# Patient Record
Sex: FEMALE | Race: WHITE | Employment: FULL TIME | URBAN - METROPOLITAN AREA
[De-identification: names, ages, dates, MRNs, and addresses within clinical notes are randomized per-mention and may not be internally consistent; named-entity substitution may affect disease eponyms.]

---

## 2017-04-17 PROBLEM — E66.9 OBESITY WITH SERIOUS COMORBIDITY: Status: ACTIVE | Noted: 2017-04-17

## 2017-08-11 PROBLEM — F41.1 GAD (GENERALIZED ANXIETY DISORDER): Status: ACTIVE | Noted: 2017-08-11

## 2017-08-11 PROBLEM — E66.9 OBESITY (BMI 35.0-39.9 WITHOUT COMORBIDITY): Status: ACTIVE | Noted: 2017-08-11

## 2017-08-11 PROBLEM — F32.A DEPRESSION: Status: ACTIVE | Noted: 2017-08-11

## 2017-08-11 PROBLEM — E66.9 OBESITY WITH SERIOUS COMORBIDITY: Status: RESOLVED | Noted: 2017-04-17 | Resolved: 2017-08-11

## 2017-09-12 ENCOUNTER — PATIENT OUTREACH (OUTPATIENT)
Dept: OTHER | Age: 52
End: 2017-09-12

## 2017-09-12 NOTE — PROGRESS NOTES
First attempt to reach patient to offer [employer] Benefits CM. Left a discreet voice mail with this CM call back number. Will attempt to call again.

## 2017-09-20 ENCOUNTER — PATIENT OUTREACH (OUTPATIENT)
Dept: OTHER | Age: 52
End: 2017-09-20

## 2017-09-20 NOTE — PROGRESS NOTES
Patient identified as eligible for [address] [place] [address] services. Second telephone outreach attempted. Left discreet voicemail with this CM confidential contact information. Will send UTR letter.

## 2017-09-20 NOTE — LETTER
9/20/2017 4:15 PM 
 
Ms. Kobe Chen [address] [place] [address] Dear Ms Servando Jeffrey My name is Jenny Walden , Employee Care Manager for [facility] and I have been trying to reach you. The Employee Care  is a free-of-charge confidential service provided to our employees and their family members covered by the [facility]. The program will provide an employee and his/her family with the [facility] expertise to assist in navigating the health care delivery system, provider services, and their overall care needsso as to assure and improve health care interactions and enhance the quality of life. This program is designed to provide you with the opportunity to have a [facility] care manager partner with you for the following services: 
 
1.)  Care transitions-such as when you come home from the hospital 
2.) When help is needed to manage your disease process 3.) When you are faced with managing a chronic or complex medical condition [facility] is dedicated to empowering the good health of its community and improving the quality of care and care experiences for employees and their families. We are commited to safeguarding patient confidentiality and privacy,  assuring that every employee has the respect he or she deserves in managing their health. The information shared with your care manager will not be shared with anyone else aside from those you identify as part of your care team, and will only be used to assist you with any identified care needs. Please contact me if you would like this service provided to you. Sincerely, Sincerely, Lb Christine RN cell [phone]

## 2017-10-13 ENCOUNTER — PATIENT OUTREACH (OUTPATIENT)
Dept: OTHER | Age: 52
End: 2017-10-13

## 2017-10-13 NOTE — LETTER
10/13/2017 1:20 PM 
 
 
Radha Mcclain [address] [place] [phone] Dear Ms Mei Mcclain My name is Len Hansen , Employee Care Manager for [facility], and I have been trying to reach you. The Employee Care  is a free-of-charge, confidential service provided to our employees and their family members covered by the [employer]. Part of my job is to follow up with members who have recently been in the hospital or emergency room, to help them coordinate their care and answer questions they may have about their visit. I am able to provide assistance with medication questions, scheduling needed follow-up appointments, and arranging services like home health or home medical equipment. I can also provide education regarding your hospital or ER visit as well as your medical conditions. As healthcare providers, we know that patients do better when they have close follow up with a primary care provider (PCP), especially after a hospital or emergency department visit. If you do not have a PCP, I can help you find one that is convenient to you and covered by your insurance. I can also help you understand any after visit instructions, such as what symptoms to watch out for, or any new or changed medications. Remember that you can access your After Visit Summary by logging into your [employer] account. If you do not have a [employer] account, I can help you request access. Our program is designed to provide you with the opportunity to have a [facility] care manager partner with you for your healthcare needs. Please contact me at the below number if I can provide you with assistance for any of the above services. Sincerely, Sincerely, Danyell Pak, EMI  
[phone]

## 2018-06-18 PROBLEM — E66.01 SEVERE OBESITY (BMI 35.0-39.9): Status: ACTIVE | Noted: 2018-06-18

## 2018-12-06 ENCOUNTER — PATIENT OUTREACH (OUTPATIENT)
Dept: OTHER | Age: 53
End: 2018-12-06

## 2018-12-06 NOTE — PROGRESS NOTES
Patient on report as eligible for Case Management. Left discreet message on voicemail with this CM contact information. Will attempt to contact again to offer [phone] Management services.

## 2018-12-11 ENCOUNTER — PATIENT OUTREACH (OUTPATIENT)
Dept: OTHER | Age: 53
End: 2018-12-11

## 2018-12-11 NOTE — PROGRESS NOTES
Patient identified as eligible for [address] [place] [address] services. Second telephone outreach attempted. Left discreet voicemail with this CM confidential contact information. Will send UTR letter.

## 2018-12-11 NOTE — LETTER
12/11/2018 3:45 PM 
 
Ms. Yolis Hernandez [address] [place] [address] Dear Ms. Deandra Hager, My name is Miguel Greenberg, Employee Care Manager for [employer] and I have been trying to reach you. The [facility]) program is a free-of-charge confidential service provided to our employees and their family members covered by the [employer]. The program will provide an employee and his/her family with the [employer] expertise to assist in navigating the health care delivery system, provider services, and their overall care needsso as to assure and improve health care interactions and enhance the quality of life. This program is designed to provide you with the opportunity to have a [employer] care manager partner with you for the following services: 
 
 1) when you come home from the hospital or emergency room 2) when help is needed to manage your disease 3) when you need assistance coordinating services or appointments [facility] now partners with [facility]. If you are a qualifying employee, you may receive an additional 10 wellness incentive points for every month of active participation with an Employee Care Manager. [employer] is dedicated to empowering the good health of its community and improving the quality of care and care experiences for employees and their families. We are committed to safeguarding patient confidentiality and privacy, assuring that every employee has the respect he or she deserves in managing their health. The information shared with your care manager will not be shared with anyone else aside from those you identify as part of your care team, and will only be used to assist you with any identified care needs. Please contact me if you would like this service provided to you. Sincerely, Carmen Martel RN

## 2019-01-04 ENCOUNTER — PATIENT OUTREACH (OUTPATIENT)
Dept: OTHER | Age: 54
End: 2019-01-04

## 2019-01-04 NOTE — PROGRESS NOTES
Patient identified as eligible for [address] [place] [address] services. Third telephone outreach attempted. Left discreet voicemail with this CM confidential contact information. UTR letter has been sent. Will resolve if no response in the next week.

## 2019-01-11 ENCOUNTER — DOCUMENTATION ONLY (OUTPATIENT)
Dept: OTHER | Age: 54
End: 2019-01-11

## 2019-09-11 ENCOUNTER — OFFICE VISIT (OUTPATIENT)
Dept: FAMILY MEDICINE CLINIC | Age: 54
End: 2019-09-11
Payer: COMMERCIAL

## 2019-09-11 VITALS
WEIGHT: 202 LBS | OXYGEN SATURATION: 96 % | HEART RATE: 90 BPM | HEIGHT: 62 IN | SYSTOLIC BLOOD PRESSURE: 126 MMHG | DIASTOLIC BLOOD PRESSURE: 80 MMHG | BODY MASS INDEX: 37.17 KG/M2

## 2019-09-11 DIAGNOSIS — Z13.220 SCREENING FOR CHOLESTEROL LEVEL: ICD-10-CM

## 2019-09-11 DIAGNOSIS — R25.2 MUSCLE CRAMPS: ICD-10-CM

## 2019-09-11 DIAGNOSIS — K58.1 IRRITABLE BOWEL SYNDROME WITH CONSTIPATION: ICD-10-CM

## 2019-09-11 DIAGNOSIS — J45.909 MILD REACTIVE AIRWAYS DISEASE, UNSPECIFIED WHETHER PERSISTENT: ICD-10-CM

## 2019-09-11 DIAGNOSIS — R00.0 TACHYCARDIA: ICD-10-CM

## 2019-09-11 DIAGNOSIS — K64.9 HEMORRHOIDS, UNSPECIFIED HEMORRHOID TYPE: ICD-10-CM

## 2019-09-11 DIAGNOSIS — G47.00 INSOMNIA, UNSPECIFIED TYPE: ICD-10-CM

## 2019-09-11 DIAGNOSIS — E03.9 HYPOTHYROIDISM, UNSPECIFIED TYPE: Primary | ICD-10-CM

## 2019-09-11 DIAGNOSIS — Z13.1 SCREENING FOR DIABETES MELLITUS: ICD-10-CM

## 2019-09-11 PROCEDURE — G8427 DOCREV CUR MEDS BY ELIG CLIN: HCPCS | Performed by: NURSE PRACTITIONER

## 2019-09-11 PROCEDURE — 3017F COLORECTAL CA SCREEN DOC REV: CPT | Performed by: NURSE PRACTITIONER

## 2019-09-11 PROCEDURE — G8417 CALC BMI ABV UP PARAM F/U: HCPCS | Performed by: NURSE PRACTITIONER

## 2019-09-11 PROCEDURE — 1036F TOBACCO NON-USER: CPT | Performed by: NURSE PRACTITIONER

## 2019-09-11 PROCEDURE — 99213 OFFICE O/P EST LOW 20 MIN: CPT | Performed by: NURSE PRACTITIONER

## 2019-09-11 RX ORDER — LEVOTHYROXINE, LIOTHYRONINE 76; 18 UG/1; UG/1
TABLET ORAL
Qty: 30 TABLET | Refills: 1 | Status: SHIPPED | OUTPATIENT
Start: 2019-09-11 | End: 2019-11-25 | Stop reason: ALTCHOICE

## 2019-09-11 RX ORDER — LEVOTHYROXINE, LIOTHYRONINE 76; 18 UG/1; UG/1
TABLET ORAL
Refills: 0 | COMMUNITY
Start: 2019-07-30 | End: 2019-09-11 | Stop reason: SDUPTHER

## 2019-09-11 RX ORDER — QUETIAPINE FUMARATE 25 MG/1
25 TABLET, FILM COATED ORAL NIGHTLY
Qty: 30 TABLET | Refills: 1 | Status: SHIPPED | OUTPATIENT
Start: 2019-09-11 | End: 2019-11-07 | Stop reason: SDUPTHER

## 2019-09-11 RX ORDER — MONTELUKAST SODIUM 10 MG/1
10 TABLET ORAL NIGHTLY
Qty: 30 TABLET | Refills: 1 | Status: SHIPPED | OUTPATIENT
Start: 2019-09-11 | End: 2019-11-07 | Stop reason: SDUPTHER

## 2019-09-11 RX ORDER — QUETIAPINE FUMARATE 25 MG/1
25 TABLET, FILM COATED ORAL
COMMUNITY
Start: 2019-01-21 | End: 2019-09-11 | Stop reason: SDUPTHER

## 2019-09-11 RX ORDER — LORATADINE AND PSEUDOEPHEDRINE 10; 240 MG/1; MG/1
1 TABLET, EXTENDED RELEASE ORAL
COMMUNITY
Start: 2019-01-07 | End: 2020-03-24

## 2019-09-11 RX ORDER — METOPROLOL SUCCINATE 25 MG/1
25 TABLET, EXTENDED RELEASE ORAL DAILY
Qty: 30 TABLET | Refills: 1 | Status: SHIPPED | OUTPATIENT
Start: 2019-09-11 | End: 2019-11-07 | Stop reason: SDUPTHER

## 2019-09-11 RX ORDER — CYCLOBENZAPRINE HCL 10 MG
10 TABLET ORAL DAILY
Qty: 30 TABLET | Refills: 1 | Status: SHIPPED | OUTPATIENT
Start: 2019-09-11 | End: 2019-11-07 | Stop reason: SDUPTHER

## 2019-09-11 RX ORDER — CYCLOBENZAPRINE HCL 10 MG
10 TABLET ORAL
COMMUNITY
Start: 2018-12-06 | End: 2019-09-11 | Stop reason: SDUPTHER

## 2019-09-11 RX ORDER — DICLOFENAC SODIUM 75 MG/1
75 TABLET, DELAYED RELEASE ORAL
COMMUNITY
Start: 2019-01-04 | End: 2020-09-01

## 2019-09-11 RX ORDER — MONTELUKAST SODIUM 10 MG/1
10 TABLET ORAL
COMMUNITY
Start: 2019-01-07 | End: 2019-09-11 | Stop reason: SDUPTHER

## 2019-09-11 ASSESSMENT — PATIENT HEALTH QUESTIONNAIRE - PHQ9
SUM OF ALL RESPONSES TO PHQ QUESTIONS 1-9: 0
SUM OF ALL RESPONSES TO PHQ QUESTIONS 1-9: 0
1. LITTLE INTEREST OR PLEASURE IN DOING THINGS: 0
2. FEELING DOWN, DEPRESSED OR HOPELESS: 0
DEPRESSION UNABLE TO ASSESS: FUNCTIONAL CAPACITY MOTIVATION LIMITS ACCURACY
SUM OF ALL RESPONSES TO PHQ9 QUESTIONS 1 & 2: 0

## 2019-09-12 PROBLEM — R00.0 TACHYCARDIA: Status: ACTIVE | Noted: 2018-04-27

## 2019-09-12 PROBLEM — E07.9 THYROID DISEASE: Status: ACTIVE | Noted: 2018-04-27

## 2019-09-12 PROBLEM — G47.09 OTHER INSOMNIA: Status: ACTIVE | Noted: 2018-04-27

## 2019-09-12 PROBLEM — I10 ESSENTIAL HYPERTENSION: Status: ACTIVE | Noted: 2018-04-27

## 2019-09-12 PROBLEM — K58.8 OTHER IRRITABLE BOWEL SYNDROME: Status: ACTIVE | Noted: 2018-04-27

## 2019-09-12 PROBLEM — K59.09 OTHER CONSTIPATION: Status: ACTIVE | Noted: 2018-04-27

## 2019-09-12 PROBLEM — O01.9 GESTATIONAL TROPHOBLASTIC DISEASE: Status: ACTIVE | Noted: 2018-04-27

## 2019-09-12 SDOH — ECONOMIC STABILITY: FOOD INSECURITY: WITHIN THE PAST 12 MONTHS, THE FOOD YOU BOUGHT JUST DIDN'T LAST AND YOU DIDN'T HAVE MONEY TO GET MORE.: NEVER TRUE

## 2019-09-12 SDOH — ECONOMIC STABILITY: TRANSPORTATION INSECURITY
IN THE PAST 12 MONTHS, HAS LACK OF TRANSPORTATION KEPT YOU FROM MEETINGS, WORK, OR FROM GETTING THINGS NEEDED FOR DAILY LIVING?: NO

## 2019-09-12 SDOH — ECONOMIC STABILITY: FOOD INSECURITY: WITHIN THE PAST 12 MONTHS, YOU WORRIED THAT YOUR FOOD WOULD RUN OUT BEFORE YOU GOT MONEY TO BUY MORE.: NEVER TRUE

## 2019-09-12 SDOH — ECONOMIC STABILITY: TRANSPORTATION INSECURITY
IN THE PAST 12 MONTHS, HAS THE LACK OF TRANSPORTATION KEPT YOU FROM MEDICAL APPOINTMENTS OR FROM GETTING MEDICATIONS?: NO

## 2019-09-12 SDOH — ECONOMIC STABILITY: INCOME INSECURITY: HOW HARD IS IT FOR YOU TO PAY FOR THE VERY BASICS LIKE FOOD, HOUSING, MEDICAL CARE, AND HEATING?: NOT HARD AT ALL

## 2019-09-25 ENCOUNTER — TELEPHONE (OUTPATIENT)
Dept: PAIN MANAGEMENT | Age: 54
End: 2019-09-25

## 2019-11-05 ENCOUNTER — INITIAL CONSULT (OUTPATIENT)
Dept: GASTROENTEROLOGY | Age: 54
End: 2019-11-05

## 2019-11-05 DIAGNOSIS — E07.9 THYROID DISEASE: Primary | ICD-10-CM

## 2019-11-06 DIAGNOSIS — Z13.1 SCREENING FOR DIABETES MELLITUS: ICD-10-CM

## 2019-11-06 DIAGNOSIS — Z13.220 SCREENING FOR CHOLESTEROL LEVEL: ICD-10-CM

## 2019-11-06 DIAGNOSIS — E07.9 THYROID DISEASE: ICD-10-CM

## 2019-11-06 DIAGNOSIS — E03.9 HYPOTHYROIDISM, UNSPECIFIED TYPE: ICD-10-CM

## 2019-11-07 DIAGNOSIS — G47.00 INSOMNIA, UNSPECIFIED TYPE: ICD-10-CM

## 2019-11-07 DIAGNOSIS — R00.0 TACHYCARDIA: ICD-10-CM

## 2019-11-07 DIAGNOSIS — R25.2 MUSCLE CRAMPS: ICD-10-CM

## 2019-11-07 DIAGNOSIS — J45.909 MILD REACTIVE AIRWAYS DISEASE, UNSPECIFIED WHETHER PERSISTENT: ICD-10-CM

## 2019-11-07 RX ORDER — METOPROLOL SUCCINATE 25 MG/1
25 TABLET, EXTENDED RELEASE ORAL DAILY
Qty: 30 TABLET | Refills: 1 | Status: SHIPPED | OUTPATIENT
Start: 2019-11-07 | End: 2020-01-14

## 2019-11-07 RX ORDER — FLUTICASONE PROPIONATE 50 MCG
2 SPRAY, SUSPENSION (ML) NASAL DAILY
Qty: 1 BOTTLE | Refills: 2 | Status: SHIPPED | OUTPATIENT
Start: 2019-11-07 | End: 2021-11-30

## 2019-11-07 RX ORDER — FLUTICASONE PROPIONATE 50 MCG
2 SPRAY, SUSPENSION (ML) NASAL DAILY
COMMUNITY
End: 2019-11-07 | Stop reason: SDUPTHER

## 2019-11-07 RX ORDER — MONTELUKAST SODIUM 10 MG/1
10 TABLET ORAL NIGHTLY
Qty: 30 TABLET | Refills: 1 | Status: SHIPPED | OUTPATIENT
Start: 2019-11-07 | End: 2020-01-14 | Stop reason: SDUPTHER

## 2019-11-07 RX ORDER — QUETIAPINE FUMARATE 25 MG/1
25 TABLET, FILM COATED ORAL NIGHTLY
Qty: 30 TABLET | Refills: 1 | Status: SHIPPED | OUTPATIENT
Start: 2019-11-07 | End: 2020-01-14

## 2019-11-07 RX ORDER — CYCLOBENZAPRINE HCL 10 MG
10 TABLET ORAL DAILY
Qty: 30 TABLET | Refills: 1 | Status: SHIPPED | OUTPATIENT
Start: 2019-11-07 | End: 2020-01-14 | Stop reason: SDUPTHER

## 2019-11-12 ENCOUNTER — NURSE ONLY (OUTPATIENT)
Dept: FAMILY MEDICINE CLINIC | Age: 54
End: 2019-11-12
Payer: COMMERCIAL

## 2019-11-12 DIAGNOSIS — R00.0 TACHYCARDIA: ICD-10-CM

## 2019-11-12 DIAGNOSIS — Z13.1 SCREENING FOR DIABETES MELLITUS: ICD-10-CM

## 2019-11-12 DIAGNOSIS — Z13.220 SCREENING FOR CHOLESTEROL LEVEL: ICD-10-CM

## 2019-11-12 DIAGNOSIS — E03.9 HYPOTHYROIDISM, UNSPECIFIED TYPE: Primary | ICD-10-CM

## 2019-11-12 LAB
A/G RATIO: 1.8 (ref 1.1–2.2)
ALBUMIN SERPL-MCNC: 4.2 G/DL (ref 3.4–5)
ALP BLD-CCNC: 77 U/L (ref 40–129)
ALT SERPL-CCNC: 14 U/L (ref 10–40)
ANION GAP SERPL CALCULATED.3IONS-SCNC: 13 MMOL/L (ref 3–16)
AST SERPL-CCNC: 14 U/L (ref 15–37)
BASOPHILS ABSOLUTE: 0.1 K/UL (ref 0–0.2)
BASOPHILS RELATIVE PERCENT: 0.9 %
BILIRUB SERPL-MCNC: 0.4 MG/DL (ref 0–1)
BUN BLDV-MCNC: 17 MG/DL (ref 7–20)
CALCIUM SERPL-MCNC: 9.5 MG/DL (ref 8.3–10.6)
CHLORIDE BLD-SCNC: 102 MMOL/L (ref 99–110)
CHOLESTEROL, TOTAL: 197 MG/DL (ref 0–199)
CO2: 26 MMOL/L (ref 21–32)
CREAT SERPL-MCNC: 0.7 MG/DL (ref 0.6–1.1)
EOSINOPHILS ABSOLUTE: 0.5 K/UL (ref 0–0.6)
EOSINOPHILS RELATIVE PERCENT: 6 %
GFR AFRICAN AMERICAN: >60
GFR NON-AFRICAN AMERICAN: >60
GLOBULIN: 2.3 G/DL
GLUCOSE BLD-MCNC: 102 MG/DL (ref 70–99)
HCT VFR BLD CALC: 40 % (ref 36–48)
HDLC SERPL-MCNC: 37 MG/DL (ref 40–60)
HEMOGLOBIN: 13.5 G/DL (ref 12–16)
LDL CHOLESTEROL CALCULATED: 117 MG/DL
LYMPHOCYTES ABSOLUTE: 3 K/UL (ref 1–5.1)
LYMPHOCYTES RELATIVE PERCENT: 35.9 %
MCH RBC QN AUTO: 29.1 PG (ref 26–34)
MCHC RBC AUTO-ENTMCNC: 33.6 G/DL (ref 31–36)
MCV RBC AUTO: 86.6 FL (ref 80–100)
MONOCYTES ABSOLUTE: 0.5 K/UL (ref 0–1.3)
MONOCYTES RELATIVE PERCENT: 5.4 %
NEUTROPHILS ABSOLUTE: 4.4 K/UL (ref 1.7–7.7)
NEUTROPHILS RELATIVE PERCENT: 51.8 %
PDW BLD-RTO: 13.4 % (ref 12.4–15.4)
PLATELET # BLD: 290 K/UL (ref 135–450)
PMV BLD AUTO: 9.4 FL (ref 5–10.5)
POTASSIUM SERPL-SCNC: 4.4 MMOL/L (ref 3.5–5.1)
RBC # BLD: 4.62 M/UL (ref 4–5.2)
SODIUM BLD-SCNC: 141 MMOL/L (ref 136–145)
TOTAL PROTEIN: 6.5 G/DL (ref 6.4–8.2)
TRIGL SERPL-MCNC: 215 MG/DL (ref 0–150)
TSH SERPL DL<=0.05 MIU/L-ACNC: 0.68 UIU/ML (ref 0.27–4.2)
VLDLC SERPL CALC-MCNC: 43 MG/DL
WBC # BLD: 8.5 K/UL (ref 4–11)

## 2019-11-12 PROCEDURE — 36415 COLL VENOUS BLD VENIPUNCTURE: CPT | Performed by: NURSE PRACTITIONER

## 2019-11-13 LAB
ESTIMATED AVERAGE GLUCOSE: 122.6 MG/DL
HBA1C MFR BLD: 5.9 %

## 2019-11-18 ENCOUNTER — INITIAL CONSULT (OUTPATIENT)
Dept: GASTROENTEROLOGY | Age: 54
End: 2019-11-18
Payer: COMMERCIAL

## 2019-11-18 VITALS
HEIGHT: 62 IN | SYSTOLIC BLOOD PRESSURE: 120 MMHG | BODY MASS INDEX: 37.17 KG/M2 | WEIGHT: 202 LBS | DIASTOLIC BLOOD PRESSURE: 80 MMHG

## 2019-11-18 DIAGNOSIS — K58.1 IRRITABLE BOWEL SYNDROME WITH CONSTIPATION: Primary | ICD-10-CM

## 2019-11-18 PROCEDURE — G8427 DOCREV CUR MEDS BY ELIG CLIN: HCPCS | Performed by: INTERNAL MEDICINE

## 2019-11-18 PROCEDURE — G8417 CALC BMI ABV UP PARAM F/U: HCPCS | Performed by: INTERNAL MEDICINE

## 2019-11-18 PROCEDURE — G8484 FLU IMMUNIZE NO ADMIN: HCPCS | Performed by: INTERNAL MEDICINE

## 2019-11-18 PROCEDURE — 99203 OFFICE O/P NEW LOW 30 MIN: CPT | Performed by: INTERNAL MEDICINE

## 2019-11-18 PROCEDURE — 1036F TOBACCO NON-USER: CPT | Performed by: INTERNAL MEDICINE

## 2019-11-18 PROCEDURE — 3017F COLORECTAL CA SCREEN DOC REV: CPT | Performed by: INTERNAL MEDICINE

## 2019-11-19 ENCOUNTER — TELEPHONE (OUTPATIENT)
Dept: FAMILY MEDICINE CLINIC | Age: 54
End: 2019-11-19

## 2019-11-19 DIAGNOSIS — R73.09 ELEVATED GLUCOSE: Primary | ICD-10-CM

## 2019-11-19 RX ORDER — METFORMIN HYDROCHLORIDE 500 MG/1
500 TABLET, EXTENDED RELEASE ORAL
COMMUNITY
End: 2019-11-19 | Stop reason: SDUPTHER

## 2019-11-19 RX ORDER — METFORMIN HYDROCHLORIDE 500 MG/1
500 TABLET, EXTENDED RELEASE ORAL
Qty: 90 TABLET | Refills: 1 | Status: SHIPPED | OUTPATIENT
Start: 2019-11-19 | End: 2020-03-02 | Stop reason: SDUPTHER

## 2019-11-21 ENCOUNTER — TELEPHONE (OUTPATIENT)
Dept: FAMILY MEDICINE CLINIC | Age: 54
End: 2019-11-21

## 2019-11-21 DIAGNOSIS — B37.31 CANDIDIASIS OF VAGINA: Primary | ICD-10-CM

## 2019-11-21 RX ORDER — FLUCONAZOLE 150 MG/1
150 TABLET ORAL
Qty: 2 TABLET | Refills: 0 | Status: SHIPPED | OUTPATIENT
Start: 2019-11-21 | End: 2019-11-27

## 2019-11-25 ENCOUNTER — TELEPHONE (OUTPATIENT)
Dept: GASTROENTEROLOGY | Age: 54
End: 2019-11-25

## 2019-11-25 ENCOUNTER — TELEPHONE (OUTPATIENT)
Dept: FAMILY MEDICINE CLINIC | Age: 54
End: 2019-11-25

## 2019-11-25 DIAGNOSIS — K58.1 IRRITABLE BOWEL SYNDROME WITH CONSTIPATION: ICD-10-CM

## 2019-11-25 DIAGNOSIS — E03.9 HYPOTHYROIDISM, UNSPECIFIED TYPE: Primary | ICD-10-CM

## 2019-11-25 RX ORDER — LEVOTHYROXINE SODIUM 0.07 MG/1
75 TABLET ORAL DAILY
Qty: 30 TABLET | Refills: 1 | Status: SHIPPED | OUTPATIENT
Start: 2019-11-25 | End: 2020-02-03 | Stop reason: ALTCHOICE

## 2019-12-05 ENCOUNTER — OFFICE VISIT (OUTPATIENT)
Dept: FAMILY MEDICINE CLINIC | Age: 54
End: 2019-12-05
Payer: COMMERCIAL

## 2019-12-05 VITALS
DIASTOLIC BLOOD PRESSURE: 60 MMHG | OXYGEN SATURATION: 96 % | HEIGHT: 62 IN | SYSTOLIC BLOOD PRESSURE: 110 MMHG | HEART RATE: 80 BPM | WEIGHT: 195 LBS | BODY MASS INDEX: 35.88 KG/M2 | TEMPERATURE: 97.6 F

## 2019-12-05 DIAGNOSIS — H92.02 OTALGIA OF LEFT EAR: Primary | ICD-10-CM

## 2019-12-05 DIAGNOSIS — J34.89 SINUS PRESSURE: ICD-10-CM

## 2019-12-05 PROBLEM — E66.01 SEVERE OBESITY WITH BODY MASS INDEX (BMI) OF 35.0 TO 39.9 WITH SERIOUS COMORBIDITY (HCC): Status: ACTIVE | Noted: 2018-06-18

## 2019-12-05 PROBLEM — F32.A DEPRESSION: Status: ACTIVE | Noted: 2017-08-11

## 2019-12-05 PROBLEM — E66.9 OBESITY (BMI 35.0-39.9 WITHOUT COMORBIDITY): Status: ACTIVE | Noted: 2017-08-11

## 2019-12-05 PROBLEM — F41.1 GAD (GENERALIZED ANXIETY DISORDER): Status: ACTIVE | Noted: 2017-08-11

## 2019-12-05 PROCEDURE — 3017F COLORECTAL CA SCREEN DOC REV: CPT | Performed by: FAMILY MEDICINE

## 2019-12-05 PROCEDURE — G8417 CALC BMI ABV UP PARAM F/U: HCPCS | Performed by: FAMILY MEDICINE

## 2019-12-05 PROCEDURE — 99213 OFFICE O/P EST LOW 20 MIN: CPT | Performed by: FAMILY MEDICINE

## 2019-12-05 PROCEDURE — G8427 DOCREV CUR MEDS BY ELIG CLIN: HCPCS | Performed by: FAMILY MEDICINE

## 2019-12-05 PROCEDURE — 1036F TOBACCO NON-USER: CPT | Performed by: FAMILY MEDICINE

## 2019-12-05 PROCEDURE — G8482 FLU IMMUNIZE ORDER/ADMIN: HCPCS | Performed by: FAMILY MEDICINE

## 2019-12-05 ASSESSMENT — ENCOUNTER SYMPTOMS
DIARRHEA: 0
RHINORRHEA: 1
ABDOMINAL PAIN: 0
VOMITING: 0
SINUS PRESSURE: 1
NAUSEA: 0
SHORTNESS OF BREATH: 0

## 2019-12-16 ENCOUNTER — OFFICE VISIT (OUTPATIENT)
Dept: GASTROENTEROLOGY | Age: 54
End: 2019-12-16
Payer: COMMERCIAL

## 2019-12-16 VITALS
WEIGHT: 199 LBS | BODY MASS INDEX: 36.62 KG/M2 | HEIGHT: 62 IN | SYSTOLIC BLOOD PRESSURE: 104 MMHG | DIASTOLIC BLOOD PRESSURE: 72 MMHG

## 2019-12-16 DIAGNOSIS — K58.1 IRRITABLE BOWEL SYNDROME WITH CONSTIPATION: Primary | ICD-10-CM

## 2019-12-16 PROBLEM — K59.09 OTHER CONSTIPATION: Status: RESOLVED | Noted: 2018-04-27 | Resolved: 2019-12-16

## 2019-12-16 PROBLEM — K58.8 OTHER IRRITABLE BOWEL SYNDROME: Status: RESOLVED | Noted: 2018-04-27 | Resolved: 2019-12-16

## 2019-12-16 PROCEDURE — 1036F TOBACCO NON-USER: CPT | Performed by: INTERNAL MEDICINE

## 2019-12-16 PROCEDURE — G8482 FLU IMMUNIZE ORDER/ADMIN: HCPCS | Performed by: INTERNAL MEDICINE

## 2019-12-16 PROCEDURE — G8427 DOCREV CUR MEDS BY ELIG CLIN: HCPCS | Performed by: INTERNAL MEDICINE

## 2019-12-16 PROCEDURE — 99212 OFFICE O/P EST SF 10 MIN: CPT | Performed by: INTERNAL MEDICINE

## 2019-12-16 PROCEDURE — 3017F COLORECTAL CA SCREEN DOC REV: CPT | Performed by: INTERNAL MEDICINE

## 2019-12-16 PROCEDURE — G8417 CALC BMI ABV UP PARAM F/U: HCPCS | Performed by: INTERNAL MEDICINE

## 2020-01-14 ENCOUNTER — TELEPHONE (OUTPATIENT)
Dept: FAMILY MEDICINE CLINIC | Age: 55
End: 2020-01-14

## 2020-01-14 RX ORDER — ONDANSETRON HYDROCHLORIDE 8 MG/1
8 TABLET, FILM COATED ORAL EVERY 8 HOURS PRN
Qty: 30 TABLET | Refills: 0 | Status: SHIPPED | OUTPATIENT
Start: 2020-01-14 | End: 2022-06-03

## 2020-01-23 LAB
T3 FREE: 2.5 PG/ML (ref 2.3–4.2)
TSH SERPL DL<=0.05 MIU/L-ACNC: 3.55 UIU/ML (ref 0.27–4.2)

## 2020-01-23 PROCEDURE — 36415 COLL VENOUS BLD VENIPUNCTURE: CPT | Performed by: NURSE PRACTITIONER

## 2020-02-03 RX ORDER — LEVOTHYROXINE SODIUM 88 UG/1
88 TABLET ORAL DAILY
Qty: 30 TABLET | Refills: 2 | Status: SHIPPED | OUTPATIENT
Start: 2020-02-03 | End: 2020-05-18

## 2020-02-03 RX ORDER — LEVOTHYROXINE SODIUM 0.07 MG/1
88 TABLET ORAL DAILY
Qty: 30 TABLET | Refills: 1 | OUTPATIENT
Start: 2020-02-03

## 2020-02-13 ENCOUNTER — TELEPHONE (OUTPATIENT)
Dept: FAMILY MEDICINE CLINIC | Age: 55
End: 2020-02-13

## 2020-02-17 RX ORDER — CYCLOBENZAPRINE HCL 10 MG
10 TABLET ORAL DAILY
Qty: 30 TABLET | Refills: 2 | Status: SHIPPED | OUTPATIENT
Start: 2020-02-17 | End: 2020-05-19 | Stop reason: SDUPTHER

## 2020-02-17 RX ORDER — MONTELUKAST SODIUM 10 MG/1
10 TABLET ORAL NIGHTLY
Qty: 30 TABLET | Refills: 2 | Status: SHIPPED | OUTPATIENT
Start: 2020-02-17 | End: 2020-10-30 | Stop reason: SDUPTHER

## 2020-02-17 NOTE — TELEPHONE ENCOUNTER
Requested Prescriptions     Pending Prescriptions Disp Refills    cyclobenzaprine (FLEXERIL) 10 MG tablet 30 tablet 2     Sig: Take 1 tablet by mouth daily    montelukast (SINGULAIR) 10 MG tablet 30 tablet 2     Sig: Take 1 tablet by mouth nightly         Last appointment: 9/11/2019  Next appointment: Visit date not found  Last refill: 1/14/2020    Orders Only on 01/23/2020   Component Date Value Ref Range Status    T3, Free 01/23/2020 2.5  2.3 - 4.2 pg/mL Final    TSH 01/23/2020 3.55  0.27 - 4.20 uIU/mL Final

## 2020-03-02 ENCOUNTER — NURSE ONLY (OUTPATIENT)
Dept: FAMILY MEDICINE CLINIC | Age: 55
End: 2020-03-02
Payer: COMMERCIAL

## 2020-03-02 LAB — HBA1C MFR BLD: 5.8 %

## 2020-03-02 PROCEDURE — 83036 HEMOGLOBIN GLYCOSYLATED A1C: CPT | Performed by: NURSE PRACTITIONER

## 2020-03-02 RX ORDER — METFORMIN HYDROCHLORIDE 500 MG/1
500 TABLET, EXTENDED RELEASE ORAL
Qty: 90 TABLET | Refills: 1 | Status: SHIPPED | OUTPATIENT
Start: 2020-03-02 | End: 2020-07-24 | Stop reason: SDUPTHER

## 2020-03-02 NOTE — TELEPHONE ENCOUNTER
Last appointment: 9/11/2019  Next appointment: Visit date not found  Last refill: 11/19/2019  Last labs:11/12/2019    Patient would like to come in for a repeat A1C today can you order this please.

## 2020-03-20 ENCOUNTER — TELEPHONE (OUTPATIENT)
Dept: FAMILY MEDICINE CLINIC | Age: 55
End: 2020-03-20

## 2020-03-20 RX ORDER — AMOXICILLIN AND CLAVULANATE POTASSIUM 875; 125 MG/1; MG/1
1 TABLET, FILM COATED ORAL 2 TIMES DAILY
Qty: 20 TABLET | Refills: 0 | Status: SHIPPED | OUTPATIENT
Start: 2020-03-20 | End: 2020-03-30

## 2020-03-20 NOTE — TELEPHONE ENCOUNTER
Patient complains of symptoms of a, possible sinusitis. Symptoms include face pressure, no fever. Onset of symptoms was 3 days ago, gradually worsening since that time. She also c/o post nasal drip for the past 2 days . She is drinking plenty of fluids. Patient is requesting antibiotics. Please advise.

## 2020-03-27 ENCOUNTER — TELEPHONE (OUTPATIENT)
Dept: FAMILY MEDICINE CLINIC | Age: 55
End: 2020-03-27

## 2020-03-27 ENCOUNTER — E-VISIT (OUTPATIENT)
Dept: FAMILY MEDICINE CLINIC | Age: 55
End: 2020-03-27

## 2020-03-27 PROCEDURE — 99421 OL DIG E/M SVC 5-10 MIN: CPT | Performed by: FAMILY MEDICINE

## 2020-03-27 RX ORDER — FLUCONAZOLE 150 MG/1
TABLET ORAL
Qty: 2 TABLET | Refills: 0 | Status: SHIPPED | OUTPATIENT
Start: 2020-03-27 | End: 2020-06-12 | Stop reason: ALTCHOICE

## 2020-03-27 NOTE — TELEPHONE ENCOUNTER
I called Dinah Lloyd, to start an e-visit. 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 When she gets chance she will start an e-visit.

## 2020-03-27 NOTE — PROGRESS NOTES
Subjective:      Patient ID: Joann Davies is a 49 y.o. female. HPI  Presents with 2 days of vaginal pain/irriattion/itching. Gradually worsening. Recently taken abx augmentin for sinusitis  No change in bathing  No douching  Sexually active  No condom use  No sti in past  Similar sx in past in past year. Not dx with BV in pas year  No other meds    Review of Systems   No fevers/chills  No dysuria or frequency or foul odor  No flank pain  No n/v    Objective:   Physical Exam  evisit deferred    Assessment and Plan   Joann Davies is 50 y/o female with yeast vaginitis. 2 dose course of diflucan due to recurrence in past and abx induced. Follow up PRN/if sx fail to improve/worsen. 1. Yeast vaginitis  - fluconazole (DIFLUCAN) 150 MG tablet; Take 1 tablet for candidal vaginitis. Repeat dose in 72 hours for 2 total doses  Dispense: 2 tablet; Refill: 0    -follow up PRN/if sx worsen/fail to improve  5-10 minutes were spent on the digital evaluation and management of this patient. Keena Hand.  Thierry Vazquez.   3/27/2020

## 2020-04-16 ENCOUNTER — E-VISIT (OUTPATIENT)
Dept: FAMILY MEDICINE CLINIC | Age: 55
End: 2020-04-16

## 2020-04-16 PROCEDURE — 99422 OL DIG E/M SVC 11-20 MIN: CPT | Performed by: NURSE PRACTITIONER

## 2020-04-16 RX ORDER — QUETIAPINE FUMARATE 25 MG/1
TABLET, FILM COATED ORAL
Qty: 30 TABLET | Refills: 1 | Status: SHIPPED | OUTPATIENT
Start: 2020-04-16 | End: 2020-05-19 | Stop reason: SDUPTHER

## 2020-04-16 NOTE — PROGRESS NOTES
Subjective:      Patient ID: Idalmis Martinez is a 49 y.o. female. HPI-this is a 43-year-old female patient with complaints of IBS. She has a chronic history of this and currently takes Linzess but is out of her medication and is wanting a refill of this today. She states with her history of IBS that she does take a stool softener. She denies any change in her bowel pattern but does have occasional constipation due to the IBS. She denies rectal bleeding, abdominal pain, any rash, itching or swelling. She also denies any nausea or vomiting. See the ED visit questionnaire for other questions  Review of Systems- IBS symptoms with constipation per patient questionaire    Objective:   Physical Exam  Cannot physically examine  Assessment:      IBS, Unspecified      Plan:      Linzess 145 MCG Take 1 capsule by mouth every morning (before breakfast),  #30 no refills   Call the office if symptoms do not improve     11-20 minutes were spent on the digital evaluation and management of this patient.     Alanis Grove, ALINA - CNP

## 2020-05-06 RX ORDER — METOPROLOL SUCCINATE 25 MG/1
TABLET, EXTENDED RELEASE ORAL
Qty: 30 TABLET | Refills: 2 | Status: SHIPPED | OUTPATIENT
Start: 2020-05-06 | End: 2020-08-19 | Stop reason: SDUPTHER

## 2020-05-18 RX ORDER — LEVOTHYROXINE SODIUM 88 UG/1
TABLET ORAL
Qty: 30 TABLET | Refills: 1 | Status: SHIPPED | OUTPATIENT
Start: 2020-05-18 | End: 2020-08-11 | Stop reason: SDUPTHER

## 2020-05-19 RX ORDER — CYCLOBENZAPRINE HCL 10 MG
10 TABLET ORAL DAILY
Qty: 30 TABLET | Refills: 5 | Status: SHIPPED | OUTPATIENT
Start: 2020-05-19 | End: 2020-11-19 | Stop reason: SDUPTHER

## 2020-05-19 RX ORDER — QUETIAPINE FUMARATE 25 MG/1
TABLET, FILM COATED ORAL
Qty: 30 TABLET | Refills: 5 | Status: SHIPPED | OUTPATIENT
Start: 2020-05-19 | End: 2020-11-19 | Stop reason: SDUPTHER

## 2020-05-19 NOTE — TELEPHONE ENCOUNTER
Last appointment: 9/11/2019  Next appointment: Visit date not found  Last refill: flexeril-02/17/2020                   seroquel-04/16/2020

## 2020-06-12 RX ORDER — FLUCONAZOLE 150 MG/1
150 TABLET ORAL ONCE
Qty: 1 TABLET | Refills: 0 | Status: SHIPPED | OUTPATIENT
Start: 2020-06-12 | End: 2020-06-12

## 2020-06-12 RX ORDER — AMOXICILLIN AND CLAVULANATE POTASSIUM 875; 125 MG/1; MG/1
1 TABLET, FILM COATED ORAL 2 TIMES DAILY
Qty: 14 TABLET | Refills: 0 | Status: SHIPPED | OUTPATIENT
Start: 2020-06-12 | End: 2020-06-19

## 2020-06-26 RX ORDER — PREDNISONE 20 MG/1
20 TABLET ORAL 2 TIMES DAILY
Qty: 10 TABLET | Refills: 0 | Status: SHIPPED | OUTPATIENT
Start: 2020-06-26 | End: 2020-07-01

## 2020-06-26 RX ORDER — DOXYCYCLINE HYCLATE 100 MG
100 TABLET ORAL 2 TIMES DAILY
Qty: 10 TABLET | Refills: 0 | Status: SHIPPED | OUTPATIENT
Start: 2020-06-26 | End: 2020-07-01

## 2020-06-26 RX ORDER — FLUCONAZOLE 150 MG/1
150 TABLET ORAL ONCE
Qty: 1 TABLET | Refills: 1 | Status: SHIPPED | OUTPATIENT
Start: 2020-06-26 | End: 2020-06-26

## 2020-07-23 ENCOUNTER — PATIENT MESSAGE (OUTPATIENT)
Dept: FAMILY MEDICINE CLINIC | Age: 55
End: 2020-07-23

## 2020-07-23 NOTE — TELEPHONE ENCOUNTER
From: Lakesha Tavera  To: Dany Staley. Tasha Ponce.,   Sent: 7/23/2020 2:03 PM EDT  Subject: Non-Urgent Medical Question    Hi! I have no refills left on my current Levothyroxine. I have about a week left. I need labs done for it. Could you order thyroid labs for me, is there an outpatient lab at [facility] I am working there.

## 2020-08-10 DIAGNOSIS — E03.9 HYPOTHYROIDISM, UNSPECIFIED TYPE: ICD-10-CM

## 2020-08-11 ENCOUNTER — PATIENT MESSAGE (OUTPATIENT)
Dept: FAMILY MEDICINE CLINIC | Age: 55
End: 2020-08-11

## 2020-08-11 LAB
T4 FREE: 1.1 NG/DL (ref 0.9–1.8)
TSH SERPL DL<=0.05 MIU/L-ACNC: 1.9 UIU/ML (ref 0.27–4.2)

## 2020-08-11 RX ORDER — LEVOTHYROXINE SODIUM 88 UG/1
TABLET ORAL
Qty: 30 TABLET | Refills: 1 | Status: SHIPPED | OUTPATIENT
Start: 2020-08-11 | End: 2020-10-30 | Stop reason: SDUPTHER

## 2020-08-11 NOTE — TELEPHONE ENCOUNTER
Jay Guidry [phone] (home)    is requesting refill(s) of medication Levothyroxine to preferred pharmacy [facility] 9/11/19 (pertaining to medication)   Last refill 5/18/20 (per medication requested)  Next office visit scheduled or attempted No  Date   If No, reason Pt was due in December. Sent mychart to schedule. Pt had tsh labs done yesteday.

## 2020-08-14 ENCOUNTER — OFFICE VISIT (OUTPATIENT)
Dept: FAMILY MEDICINE CLINIC | Age: 55
End: 2020-08-14
Payer: COMMERCIAL

## 2020-08-14 VITALS
RESPIRATION RATE: 16 BRPM | BODY MASS INDEX: 37.17 KG/M2 | TEMPERATURE: 98.2 F | WEIGHT: 203.2 LBS | SYSTOLIC BLOOD PRESSURE: 126 MMHG | DIASTOLIC BLOOD PRESSURE: 76 MMHG

## 2020-08-14 PROCEDURE — 99213 OFFICE O/P EST LOW 20 MIN: CPT | Performed by: NURSE PRACTITIONER

## 2020-08-14 RX ORDER — AMOXICILLIN AND CLAVULANATE POTASSIUM 875; 125 MG/1; MG/1
1 TABLET, FILM COATED ORAL 2 TIMES DAILY
Qty: 14 TABLET | Refills: 0 | Status: SHIPPED | OUTPATIENT
Start: 2020-08-14 | End: 2020-08-21

## 2020-08-14 RX ORDER — FLUCONAZOLE 150 MG/1
150 TABLET ORAL
Qty: 2 TABLET | Refills: 0 | Status: SHIPPED | OUTPATIENT
Start: 2020-08-14 | End: 2020-08-20

## 2020-08-14 ASSESSMENT — ENCOUNTER SYMPTOMS
COUGH: 0
EYE DISCHARGE: 0
TROUBLE SWALLOWING: 0
RHINORRHEA: 1
EYE PAIN: 0
NAUSEA: 0
SHORTNESS OF BREATH: 0
DIARRHEA: 0
VOMITING: 0
SORE THROAT: 0
EYE ITCHING: 0

## 2020-08-14 NOTE — PROGRESS NOTES
Sharlene Kugel  1965 08/14/20    Chief Armida Curiel is a 48 y.o. female who is here for   Chief Complaint   Patient presents with    Otalgia     pressure and has a rawing sound. started about 2 days ago        HPI:   Patient seen in the office for right ear pain. Otalgia    There is pain in the right ear. This is a new problem. Episode onset: 3 days. The problem occurs constantly. The problem has been unchanged. There has been no fever. Associated symptoms include hearing loss (decreased hearing right ear) and rhinorrhea. Pertinent negatives include no coughing, diarrhea, ear discharge, headaches, sore throat or vomiting. Treatments tried: flonase, claritin. The treatment provided mild relief. There is no history of a chronic ear infection, hearing loss or a tympanostomy tube. has had frequent ear infections as adult        ROS:  Review of Systems   Constitutional: Negative for chills, diaphoresis, fatigue and fever. HENT: Positive for ear pain (fullness), hearing loss (decreased hearing right ear) and rhinorrhea. Negative for ear discharge, sore throat, tinnitus and trouble swallowing. Eyes: Negative for pain, discharge and itching. Respiratory: Negative for cough and shortness of breath. Cardiovascular: Negative for chest pain and palpitations. Gastrointestinal: Negative for diarrhea, nausea and vomiting. Musculoskeletal: Negative for myalgias. Allergic/Immunologic: Positive for environmental allergies. Neurological: Negative for dizziness, weakness and headaches. Psychiatric/Behavioral: Negative.         Past medical history:  Past Medical History:   Diagnosis Date    Hypothyroidism     IBS (irritable bowel syndrome)     with constipation    Insomnia     Mild reactive airways disease     Muscle cramps     Tachycardia        Surgical history:  Past Surgical History:   Procedure Laterality Date    DILATION AND CURETTAGE OF UTERUS      HYSTERECTOMY, TOTAL ABDOMINAL  SINUS SURGERY         Social history:  Social History     Socioeconomic History    Marital status:      Spouse name: None    Number of children: None    Years of education: None    Highest education level: None   Occupational History    Occupation: [employer]     Employer: [employer]   Social Needs    Financial resource strain: Not hard at all   [address] insecurity     Worry: Never true     Inability: Never true    Transportation needs     Medical: No     Non-medical: No   Tobacco Use    Smoking status: Never Smoker    Smokeless tobacco: Never Used   Substance and Sexual Activity    Alcohol use: Yes     Alcohol/week: 1.0 standard drinks     Types: 1 Standard drinks or equivalent per week     Comment: rarely    Drug use: Never    Sexual activity: Yes     Partners: Male     Birth control/protection: Surgical   Lifestyle    Physical activity     Days per week: None     Minutes per session: None    Stress: None   Relationships    Social connections     Talks on phone: None     Gets together: None     Attends Buddhist service: None     Active member of club or organization: None     Attends meetings of clubs or organizations: None     Relationship status: None    Intimate partner violence     Fear of current or ex partner: None     Emotionally abused: None     Physically abused: None     Forced sexual activity: None   Other Topics Concern    None   Social History Narrative    None       Tobacco use:  Social History     Tobacco Use   Smoking Status Never Smoker   Smokeless Tobacco Never Used       Medical, surgical, and social history reviewed. and allergies reviewed.     No Known Allergies  Current Outpatient Medications   Medication Sig Dispense Refill    amoxicillin-clavulanate (AUGMENTIN) 875-125 MG per tablet Take 1 tablet by mouth 2 times daily for 7 days 14 tablet 0    fluconazole (DIFLUCAN) 150 MG tablet Take 1 tablet by mouth every 72 hours for 6 days 2 tablet 0    levothyroxine (SYNTHROID) 88 MCG tablet TAKE ONE TABLET BY MOUTH DAILY 30 tablet 1    metFORMIN (GLUCOPHAGE-XR) 500 MG extended release tablet Take 1 tablet by mouth daily (with breakfast) 90 tablet 1    cyclobenzaprine (FLEXERIL) 10 MG tablet Take 1 tablet by mouth daily 30 tablet 5    QUEtiapine (SEROQUEL) 25 MG tablet TAKE ONE TABLET BY MOUTH ONCE NIGHTLY 30 tablet 5    metoprolol succinate (TOPROL XL) 25 MG extended release tablet TAKE ONE TABLET BY MOUTH DAILY 30 tablet 2    loratadine-pseudoephedrine (CLARITIN-D 24 HOUR)  MG per extended release tablet Take 1 tablet by mouth daily 30 tablet 1    montelukast (SINGULAIR) 10 MG tablet Take 1 tablet by mouth nightly 30 tablet 2    linaclotide (LINZESS) 145 MCG capsule Take 1 capsule by mouth every morning (before breakfast) (Patient not taking: Reported on 8/14/2020) 30 capsule 0    ondansetron (ZOFRAN) 8 MG tablet Take 1 tablet by mouth every 8 hours as needed for Nausea or Vomiting (Patient not taking: Reported on 8/14/2020) 30 tablet 0    fluticasone (FLONASE) 50 MCG/ACT nasal spray 2 sprays by Nasal route daily 1 Bottle 2    diclofenac (VOLTAREN) 75 MG EC tablet Take 75 mg by mouth       No current facility-administered medications for this visit. Objective:   /76   Temp 98.2 °F (36.8 °C) (Oral)   Resp 16   Wt 203 lb 3.2 oz (92.2 kg)   Breastfeeding No   BMI 37.17 kg/m²   Physical Exam  Constitutional:       Appearance: Normal appearance. She is normal weight. HENT:      Head: Normocephalic. Right Ear: External ear normal. Decreased hearing noted. No laceration, drainage, swelling or tenderness. A middle ear effusion is present. There is impacted cerumen. No foreign body. No mastoid tenderness. No PE tube. Tympanic membrane is erythematous and bulging. Tympanic membrane is not injected, scarred, perforated or retracted. Left Ear: Tympanic membrane, ear canal and external ear normal. No decreased hearing noted. No middle ear effusion.  There is

## 2020-08-19 ENCOUNTER — PATIENT MESSAGE (OUTPATIENT)
Dept: FAMILY MEDICINE CLINIC | Age: 55
End: 2020-08-19

## 2020-08-19 RX ORDER — METOPROLOL SUCCINATE 25 MG/1
TABLET, EXTENDED RELEASE ORAL
Qty: 30 TABLET | Refills: 2 | Status: SHIPPED | OUTPATIENT
Start: 2020-08-19 | End: 2020-12-10 | Stop reason: SDUPTHER

## 2020-09-01 ENCOUNTER — OFFICE VISIT (OUTPATIENT)
Dept: PRIMARY CARE CLINIC | Age: 55
End: 2020-09-01
Payer: COMMERCIAL

## 2020-09-01 VITALS — TEMPERATURE: 98 F | HEIGHT: 62 IN | WEIGHT: 197 LBS | BODY MASS INDEX: 36.25 KG/M2

## 2020-09-01 PROCEDURE — 99214 OFFICE O/P EST MOD 30 MIN: CPT | Performed by: FAMILY MEDICINE

## 2020-09-01 PROCEDURE — G8417 CALC BMI ABV UP PARAM F/U: HCPCS | Performed by: FAMILY MEDICINE

## 2020-09-01 PROCEDURE — G8427 DOCREV CUR MEDS BY ELIG CLIN: HCPCS | Performed by: FAMILY MEDICINE

## 2020-09-01 PROCEDURE — 1036F TOBACCO NON-USER: CPT | Performed by: FAMILY MEDICINE

## 2020-09-01 PROCEDURE — 3017F COLORECTAL CA SCREEN DOC REV: CPT | Performed by: FAMILY MEDICINE

## 2020-09-01 ASSESSMENT — ENCOUNTER SYMPTOMS
WHEEZING: 0
SHORTNESS OF BREATH: 0
ABDOMINAL PAIN: 0
RHINORRHEA: 0
CONSTIPATION: 1
COUGH: 0
DIARRHEA: 0
NAUSEA: 0
CHEST TIGHTNESS: 0
SINUS PRESSURE: 0
VOMITING: 0
BACK PAIN: 0
BLOOD IN STOOL: 0
SORE THROAT: 0
SINUS PAIN: 0

## 2020-09-01 NOTE — PROGRESS NOTES
Chief Complaint   Patient presents with    New Patient     Become established    Hypertension       HPI:  Ludmila Hurtado is a 48 y.o. (: 1965) here today to establish care. Health goal is to lose weight. Seeing dietician for the last week and plans to continue. Has been taking metformin daily. Also diagnosed with prediabetes. Lab Results   Component Value Date    LABA1C 5.8 2020     Lab Results   Component Value Date    .6 2019     History of IBS constipation type. BMs every 3 days. Jaymie Monge is too expensive her to continue. She does do MOM and MiraLAX as well as Dulcolax. She will go through phases where she is very irregular. Has been worked up by GI and there is really nothing else to be done. History of hypothyroidism. Rx: Levothyroxine 88 mcg daily. Lab Results   Component Value Date    TSH 1.90 08/10/2020     Has been diagnosed with hypertension in the past.  Triage blood pressure is within normal limits. Metoprolol is for fast heart rate per patient. The 10-year ASCVD risk score (Effie Nicole et al., 2013) is: 2.6%    Values used to calculate the score:      Age: 48 years      Sex: Female      Is Non- : No      Diabetic: No      Tobacco smoker: No      Systolic Blood Pressure: 902 mmHg      Is BP treated: No      HDL Cholesterol: 37 mg/dL      Total Cholesterol: 197 mg/dL    Review of Systems   Constitutional: Negative for activity change, appetite change, chills, fatigue, fever and unexpected weight change. HENT: Negative for congestion, postnasal drip, rhinorrhea, sinus pressure, sinus pain, sneezing and sore throat. Eyes: Negative for visual disturbance. Respiratory: Negative for cough, chest tightness, shortness of breath and wheezing. Cardiovascular: Negative for chest pain and palpitations. Gastrointestinal: Positive for constipation. Negative for abdominal pain, blood in stool, diarrhea, nausea and vomiting.    Endocrine: Negative for cold intolerance, heat intolerance, polydipsia and polyuria. Genitourinary: Negative for dysuria, frequency, vaginal bleeding and vaginal discharge. Musculoskeletal: Negative for arthralgias, back pain, joint swelling, myalgias and neck pain. Skin: Negative for rash and wound. Allergic/Immunologic: Negative for environmental allergies. Neurological: Negative for dizziness, tremors, syncope, weakness, light-headedness, numbness and headaches. Hematological: Negative for adenopathy. Psychiatric/Behavioral: Negative for behavioral problems, decreased concentration, sleep disturbance and suicidal ideas. The patient is not nervous/anxious. Past Medical History:   Diagnosis Date    Hypertension     Hypothyroidism     IBS (irritable bowel syndrome)     with constipation    Insomnia     Mild reactive airways disease     Muscle cramps     Tachycardia        Family History   Problem Relation Age of Onset    Dementia Mother         breast cancer    Other Father         MVA    Alzheimer's Disease Maternal Grandmother     Heart Failure Maternal Grandfather        Social History     Tobacco Use    Smoking status: Never Smoker    Smokeless tobacco: Never Used   Substance Use Topics    Alcohol use:  Yes     Alcohol/week: 1.0 standard drinks     Types: 1 Standard drinks or equivalent per week     Comment: rarely    Drug use: Never       New Prescriptions    No medications on file       Meds Prior to visit:  Current Outpatient Medications on File Prior to Visit   Medication Sig Dispense Refill    metoprolol succinate (TOPROL XL) 25 MG extended release tablet TAKE ONE TABLET BY MOUTH DAILY 30 tablet 2    levothyroxine (SYNTHROID) 88 MCG tablet TAKE ONE TABLET BY MOUTH DAILY 30 tablet 1    metFORMIN (GLUCOPHAGE-XR) 500 MG extended release tablet Take 1 tablet by mouth daily (with breakfast) 90 tablet 1    cyclobenzaprine (FLEXERIL) 10 MG tablet Take 1 tablet by mouth daily 30 tablet 5  QUEtiapine (SEROQUEL) 25 MG tablet TAKE ONE TABLET BY MOUTH ONCE NIGHTLY 30 tablet 5    loratadine-pseudoephedrine (CLARITIN-D 24 HOUR)  MG per extended release tablet Take 1 tablet by mouth daily 30 tablet 1    montelukast (SINGULAIR) 10 MG tablet Take 1 tablet by mouth nightly 30 tablet 2    ondansetron (ZOFRAN) 8 MG tablet Take 1 tablet by mouth every 8 hours as needed for Nausea or Vomiting 30 tablet 0    fluticasone (FLONASE) 50 MCG/ACT nasal spray 2 sprays by Nasal route daily 1 Bottle 2     No current facility-administered medications on file prior to visit. No Known Allergies    OBJECTIVE:  Temp 98 °F (36.7 °C)   Ht 5' 2\" (1.575 m)   Wt 197 lb (89.4 kg)   BMI 36.03 kg/m²   BP Readings from Last 2 Encounters:   08/14/20 126/76   12/16/19 104/72     Wt Readings from Last 3 Encounters:   09/01/20 197 lb (89.4 kg)   08/14/20 203 lb 3.2 oz (92.2 kg)   12/16/19 199 lb (90.3 kg)       Physical Exam  Vitals signs reviewed. Constitutional:       General: She is not in acute distress. Appearance: Normal appearance. She is well-developed. She is obese. She is not ill-appearing. HENT:      Head: Normocephalic and atraumatic. Right Ear: Tympanic membrane, ear canal and external ear normal. There is no impacted cerumen. Left Ear: Tympanic membrane, ear canal and external ear normal. There is no impacted cerumen. Nose: Nose normal.      Mouth/Throat:      Mouth: Mucous membranes are moist.      Pharynx: Oropharynx is clear. No oropharyngeal exudate or posterior oropharyngeal erythema. Eyes:      General: No scleral icterus. Right eye: No discharge. Left eye: No discharge. Extraocular Movements: Extraocular movements intact. Conjunctiva/sclera: Conjunctivae normal.      Pupils: Pupils are equal, round, and reactive to light. Neck:      Musculoskeletal: Normal range of motion and neck supple.    Cardiovascular:      Rate and Rhythm: Normal rate and regular rhythm. Pulses: Normal pulses. Heart sounds: Normal heart sounds. No murmur. Comments: Radial and pedal pulses intact  Pulmonary:      Effort: Pulmonary effort is normal. No respiratory distress. Breath sounds: Normal breath sounds. No wheezing or rales. Chest:      Chest wall: No tenderness. Abdominal:      General: Bowel sounds are normal.      Palpations: Abdomen is soft. Tenderness: There is no abdominal tenderness. There is no guarding. Comments: Normal liver and spleen. No organomegaly   Musculoskeletal: Normal range of motion. General: No tenderness. Comments: Intact in all extremities   Lymphadenopathy:      Cervical: No cervical adenopathy. Skin:     General: Skin is warm. Capillary Refill: Capillary refill takes less than 2 seconds. Findings: No erythema or rash. Neurological:      General: No focal deficit present. Mental Status: She is alert and oriented to person, place, and time. Mental status is at baseline. Motor: No weakness or abnormal muscle tone. Coordination: Coordination normal.      Gait: Gait normal.      Deep Tendon Reflexes: Reflexes normal.   Psychiatric:         Mood and Affect: Mood normal.         Behavior: Behavior normal.         Thought Content:  Thought content normal.         Judgment: Judgment normal.         Lab Results   Component Value Date    WBC 8.5 11/12/2019    HGB 13.5 11/12/2019    HCT 40.0 11/12/2019    MCV 86.6 11/12/2019     11/12/2019     Lab Results   Component Value Date     11/12/2019    K 4.4 11/12/2019     11/12/2019    CO2 26 11/12/2019    BUN 17 11/12/2019    CREATININE 0.7 11/12/2019    GLUCOSE 102 (H) 11/12/2019    CALCIUM 9.5 11/12/2019    PROT 6.5 11/12/2019    LABALBU 4.2 11/12/2019    BILITOT 0.4 11/12/2019    ALKPHOS 77 11/12/2019    AST 14 (L) 11/12/2019    ALT 14 11/12/2019    LABGLOM >60 11/12/2019    GFRAA >60 11/12/2019    AGRATIO 1.8 11/12/2019 GLOB 2.3 11/12/2019     Lab Results   Component Value Date    CHOL 197 11/12/2019     Lab Results   Component Value Date    TRIG 215 (H) 11/12/2019     Lab Results   Component Value Date    HDL 37 (L) 11/12/2019     Lab Results   Component Value Date    LDLCALC 117 (H) 11/12/2019     Lab Results   Component Value Date    LABVLDL 43 11/12/2019     Lab Results   Component Value Date    LABA1C 5.8 03/02/2020         ASSESSMENT/PLAN:  1. Encounter to establish care  VS reviewed and WNL    BMI reviewed   All questions answered. Healthy lifestyle modifications discussed. Follow-up in 6 months    2. BMI 36.0-36.9,adult  Health goal is to lose weight. Will update A1c and lipid panel. Follow-up labs and treat accordingly. Continue metformin for now. Continue seeing dietitian  - Comprehensive Metabolic Panel; Future  - HEMOGLOBIN A1C; Future  - Lipid Panel; Future    3. Essential hypertension  Triage blood pressure was within normal limits  Continue to monitor. Update kidney function, update A1c and lipid panel  - CBC Auto Differential; Future  - Comprehensive Metabolic Panel; Future  - HEMOGLOBIN A1C; Future  - Lipid Panel; Future    4. Hypothyroidism, unspecified type  Last TSH was within normal limits. Continue levo at current dose. 5. Pre-diabetes  Update A1c and kidney function  - Comprehensive Metabolic Panel; Future  - HEMOGLOBIN A1C; Future  - Lipid Panel; Future      Discussed use, benefit, and side effects of prescribed medications. Barriers to medication compliance addressed. All patient questions answered. Pt voiced understanding. RTC Return in about 6 months (around 3/1/2021) for follow up.     Future Appointments   Date Time Provider Denisha Crews   3/1/2021  3:30 PM MD [facility] Feroz Matamoros MD  9/1/2020  3:20 PM

## 2020-09-03 ENCOUNTER — PATIENT MESSAGE (OUTPATIENT)
Dept: PRIMARY CARE CLINIC | Age: 55
End: 2020-09-03

## 2020-09-03 RX ORDER — FLUCONAZOLE 150 MG/1
150 TABLET ORAL ONCE
Qty: 1 TABLET | Refills: 2 | Status: SHIPPED | OUTPATIENT
Start: 2020-09-03 | End: 2020-09-03

## 2020-09-03 NOTE — TELEPHONE ENCOUNTER
From: Agnieszka Holden  To: Michael Chowdhury MD  Sent: 9/3/2020 9:54 AM EDT  Subject: Prescription Question    Last week I had ear problem and was given augmentin at a walk in. I am having yeast infection symptoms, could we do diflucan? I get them with this medicine. I use [facility].  Thanks

## 2020-09-10 DIAGNOSIS — R73.03 PRE-DIABETES: ICD-10-CM

## 2020-09-10 DIAGNOSIS — I10 ESSENTIAL HYPERTENSION: ICD-10-CM

## 2020-09-10 LAB
A/G RATIO: 1.8 (ref 1.1–2.2)
ALBUMIN SERPL-MCNC: 4.4 G/DL (ref 3.4–5)
ALP BLD-CCNC: 68 U/L (ref 40–129)
ALT SERPL-CCNC: 12 U/L (ref 10–40)
ANION GAP SERPL CALCULATED.3IONS-SCNC: 12 MMOL/L (ref 3–16)
AST SERPL-CCNC: 15 U/L (ref 15–37)
BASOPHILS ABSOLUTE: 0 K/UL (ref 0–0.2)
BASOPHILS RELATIVE PERCENT: 0.2 %
BILIRUB SERPL-MCNC: 0.3 MG/DL (ref 0–1)
BUN BLDV-MCNC: 16 MG/DL (ref 7–20)
CALCIUM SERPL-MCNC: 10 MG/DL (ref 8.3–10.6)
CHLORIDE BLD-SCNC: 102 MMOL/L (ref 99–110)
CHOLESTEROL, TOTAL: 208 MG/DL (ref 0–199)
CO2: 26 MMOL/L (ref 21–32)
CREAT SERPL-MCNC: 0.8 MG/DL (ref 0.6–1.1)
EOSINOPHILS ABSOLUTE: 0.3 K/UL (ref 0–0.6)
EOSINOPHILS RELATIVE PERCENT: 3.7 %
GFR AFRICAN AMERICAN: >60
GFR NON-AFRICAN AMERICAN: >60
GLOBULIN: 2.4 G/DL
GLUCOSE BLD-MCNC: 107 MG/DL (ref 70–99)
HCT VFR BLD CALC: 42.7 % (ref 36–48)
HDLC SERPL-MCNC: 38 MG/DL (ref 40–60)
HEMOGLOBIN: 13.9 G/DL (ref 12–16)
LDL CHOLESTEROL CALCULATED: 135 MG/DL
LYMPHOCYTES ABSOLUTE: 3.5 K/UL (ref 1–5.1)
LYMPHOCYTES RELATIVE PERCENT: 44.9 %
MCH RBC QN AUTO: 28.7 PG (ref 26–34)
MCHC RBC AUTO-ENTMCNC: 32.5 G/DL (ref 31–36)
MCV RBC AUTO: 88.3 FL (ref 80–100)
MONOCYTES ABSOLUTE: 0.4 K/UL (ref 0–1.3)
MONOCYTES RELATIVE PERCENT: 5.7 %
NEUTROPHILS ABSOLUTE: 3.5 K/UL (ref 1.7–7.7)
NEUTROPHILS RELATIVE PERCENT: 45.5 %
PDW BLD-RTO: 13.5 % (ref 12.4–15.4)
PLATELET # BLD: 270 K/UL (ref 135–450)
PMV BLD AUTO: 9.6 FL (ref 5–10.5)
POTASSIUM SERPL-SCNC: 4.4 MMOL/L (ref 3.5–5.1)
RBC # BLD: 4.83 M/UL (ref 4–5.2)
SODIUM BLD-SCNC: 140 MMOL/L (ref 136–145)
TOTAL PROTEIN: 6.8 G/DL (ref 6.4–8.2)
TRIGL SERPL-MCNC: 176 MG/DL (ref 0–150)
VLDLC SERPL CALC-MCNC: 35 MG/DL
WBC # BLD: 7.8 K/UL (ref 4–11)

## 2020-09-11 LAB
ESTIMATED AVERAGE GLUCOSE: 122.6 MG/DL
HBA1C MFR BLD: 5.9 %

## 2020-10-30 ENCOUNTER — E-VISIT (OUTPATIENT)
Dept: PRIMARY CARE CLINIC | Age: 55
End: 2020-10-30
Payer: COMMERCIAL

## 2020-10-30 PROCEDURE — 99421 OL DIG E/M SVC 5-10 MIN: CPT | Performed by: FAMILY MEDICINE

## 2020-10-30 RX ORDER — LEVOTHYROXINE SODIUM 88 UG/1
TABLET ORAL
Qty: 30 TABLET | Refills: 1 | Status: SHIPPED | OUTPATIENT
Start: 2020-10-30 | End: 2021-01-10 | Stop reason: SDUPTHER

## 2020-10-30 RX ORDER — BUPROPION HYDROCHLORIDE 150 MG/1
150 TABLET ORAL EVERY MORNING
Qty: 90 TABLET | Refills: 3 | Status: SHIPPED | OUTPATIENT
Start: 2020-10-30 | End: 2020-12-21 | Stop reason: SDUPTHER

## 2020-10-30 RX ORDER — MONTELUKAST SODIUM 10 MG/1
10 TABLET ORAL NIGHTLY
Qty: 30 TABLET | Refills: 2 | Status: SHIPPED | OUTPATIENT
Start: 2020-10-30 | End: 2021-03-30

## 2020-10-30 NOTE — TELEPHONE ENCOUNTER
Refill Request     Last Seen: 9/1/2020    Last Written: 2/17/2020    Next Appointment:   Future Appointments   Date Time Provider Denisha Crews   3/1/2021  3:30 PM [facility]             Requested Prescriptions     Pending Prescriptions Disp Refills    montelukast (SINGULAIR) 10 MG tablet 30 tablet 2     Sig: Take 1 tablet by mouth nightly

## 2020-11-20 RX ORDER — QUETIAPINE FUMARATE 25 MG/1
TABLET, FILM COATED ORAL
Qty: 30 TABLET | Refills: 5 | Status: SHIPPED | OUTPATIENT
Start: 2020-11-20 | End: 2021-06-01

## 2020-11-20 RX ORDER — CYCLOBENZAPRINE HCL 10 MG
10 TABLET ORAL DAILY
Qty: 30 TABLET | Refills: 5 | Status: SHIPPED | OUTPATIENT
Start: 2020-11-20 | End: 2021-07-06 | Stop reason: SDUPTHER

## 2020-11-23 ENCOUNTER — TELEPHONE (OUTPATIENT)
Dept: GASTROENTEROLOGY | Age: 55
End: 2020-11-23

## 2020-12-11 RX ORDER — METOPROLOL SUCCINATE 25 MG/1
TABLET, EXTENDED RELEASE ORAL
Qty: 30 TABLET | Refills: 2 | Status: SHIPPED | OUTPATIENT
Start: 2020-12-11 | End: 2021-03-30

## 2020-12-18 ENCOUNTER — PATIENT MESSAGE (OUTPATIENT)
Dept: PRIMARY CARE CLINIC | Age: 55
End: 2020-12-18

## 2020-12-21 RX ORDER — BUPROPION HYDROCHLORIDE 150 MG/1
300 TABLET ORAL EVERY MORNING
Qty: 180 TABLET | Refills: 3 | Status: SHIPPED | OUTPATIENT
Start: 2020-12-21 | End: 2021-01-13 | Stop reason: SDUPTHER

## 2021-01-05 ENCOUNTER — E-VISIT (OUTPATIENT)
Dept: PRIMARY CARE CLINIC | Age: 56
End: 2021-01-05
Payer: COMMERCIAL

## 2021-01-05 DIAGNOSIS — J01.10 ACUTE NON-RECURRENT FRONTAL SINUSITIS: Primary | ICD-10-CM

## 2021-01-05 PROCEDURE — 99421 OL DIG E/M SVC 5-10 MIN: CPT | Performed by: FAMILY MEDICINE

## 2021-01-05 RX ORDER — AMOXICILLIN AND CLAVULANATE POTASSIUM 875; 125 MG/1; MG/1
1 TABLET, FILM COATED ORAL 2 TIMES DAILY
Qty: 14 TABLET | Refills: 0 | Status: SHIPPED | OUTPATIENT
Start: 2021-01-05 | End: 2021-01-12

## 2021-01-05 ASSESSMENT — LIFESTYLE VARIABLES: SMOKING_STATUS: NO, I'VE NEVER SMOKED

## 2021-01-05 NOTE — PROGRESS NOTES
Per ED visit encounter. Will treat with Augmentin and she will continue her Claritin-D. If she develops fevers or worsening of symptoms, return to clinic or call. 1. Acute non-recurrent frontal sinusitis  - amoxicillin-clavulanate (AUGMENTIN) 875-125 MG per tablet; Take 1 tablet by mouth 2 times daily for 7 days  Dispense: 14 tablet;  Refill: 0    Didi Montes MD  1/5/2021  5:51 PM

## 2021-01-10 DIAGNOSIS — E03.9 HYPOTHYROIDISM, UNSPECIFIED TYPE: ICD-10-CM

## 2021-01-11 RX ORDER — LEVOTHYROXINE SODIUM 88 UG/1
TABLET ORAL
Qty: 30 TABLET | Refills: 1 | Status: SHIPPED | OUTPATIENT
Start: 2021-01-11 | End: 2021-02-24 | Stop reason: SDUPTHER

## 2021-01-13 DIAGNOSIS — F41.1 GAD (GENERALIZED ANXIETY DISORDER): ICD-10-CM

## 2021-01-13 DIAGNOSIS — F33.1 MODERATE EPISODE OF RECURRENT MAJOR DEPRESSIVE DISORDER (HCC): ICD-10-CM

## 2021-01-13 RX ORDER — BUPROPION HYDROCHLORIDE 150 MG/1
300 TABLET ORAL EVERY MORNING
Qty: 180 TABLET | Refills: 3 | Status: SHIPPED | OUTPATIENT
Start: 2021-01-13 | End: 2021-08-10 | Stop reason: SDUPTHER

## 2021-01-22 ENCOUNTER — PATIENT MESSAGE (OUTPATIENT)
Dept: PRIMARY CARE CLINIC | Age: 56
End: 2021-01-22

## 2021-01-25 RX ORDER — FLUCONAZOLE 100 MG/1
100 TABLET ORAL DAILY
Qty: 7 TABLET | Refills: 0 | Status: SHIPPED | OUTPATIENT
Start: 2021-01-25 | End: 2021-02-01

## 2021-01-25 NOTE — TELEPHONE ENCOUNTER
From: Maynor Santo  To: Loc Rutledge MD  Sent: 1/22/2021 3:57 PM EST  Subject: Prescription Question    Could I get diflucan sent in?  After that augmentin for my sinus I need it, thanks

## 2021-02-24 ENCOUNTER — CLINICAL DOCUMENTATION (OUTPATIENT)
Dept: FAMILY MEDICINE CLINIC | Age: 56
End: 2021-02-24

## 2021-02-24 DIAGNOSIS — E03.9 HYPOTHYROIDISM, UNSPECIFIED TYPE: ICD-10-CM

## 2021-02-24 RX ORDER — LEVOTHYROXINE SODIUM 88 UG/1
TABLET ORAL
Qty: 30 TABLET | Refills: 1 | Status: SHIPPED | OUTPATIENT
Start: 2021-02-24 | End: 2021-05-18 | Stop reason: SDUPTHER

## 2021-02-24 RX ORDER — ATORVASTATIN CALCIUM 10 MG/1
10 TABLET, FILM COATED ORAL DAILY
Qty: 30 TABLET | Refills: 3 | Status: SHIPPED | OUTPATIENT
Start: 2021-02-24 | End: 2021-08-10

## 2021-03-29 DIAGNOSIS — R00.0 TACHYCARDIA: ICD-10-CM

## 2021-03-29 DIAGNOSIS — J45.909 MILD REACTIVE AIRWAYS DISEASE, UNSPECIFIED WHETHER PERSISTENT: ICD-10-CM

## 2021-03-30 RX ORDER — MONTELUKAST SODIUM 10 MG/1
TABLET ORAL
Qty: 30 TABLET | Refills: 1 | Status: SHIPPED | OUTPATIENT
Start: 2021-03-30 | End: 2021-07-06

## 2021-03-30 RX ORDER — METOPROLOL SUCCINATE 25 MG/1
TABLET, EXTENDED RELEASE ORAL
Qty: 90 TABLET | Refills: 1 | Status: SHIPPED | OUTPATIENT
Start: 2021-03-30 | End: 2021-09-17 | Stop reason: SDUPTHER

## 2021-05-18 DIAGNOSIS — E03.9 HYPOTHYROIDISM, UNSPECIFIED TYPE: ICD-10-CM

## 2021-05-18 RX ORDER — LEVOTHYROXINE SODIUM 88 UG/1
TABLET ORAL
Qty: 90 TABLET | Refills: 1 | Status: SHIPPED | OUTPATIENT
Start: 2021-05-18 | End: 2021-05-25 | Stop reason: SDUPTHER

## 2021-05-25 DIAGNOSIS — E03.9 HYPOTHYROIDISM, UNSPECIFIED TYPE: ICD-10-CM

## 2021-05-25 RX ORDER — LEVOTHYROXINE SODIUM 88 UG/1
TABLET ORAL
Qty: 90 TABLET | Refills: 1 | Status: SHIPPED | OUTPATIENT
Start: 2021-05-25 | End: 2021-08-13 | Stop reason: SDUPTHER

## 2021-06-14 ENCOUNTER — OFFICE VISIT (OUTPATIENT)
Dept: BARIATRICS/WEIGHT MGMT | Age: 56
End: 2021-06-14

## 2021-06-14 VITALS
DIASTOLIC BLOOD PRESSURE: 85 MMHG | WEIGHT: 201 LBS | SYSTOLIC BLOOD PRESSURE: 126 MMHG | HEART RATE: 80 BPM | BODY MASS INDEX: 35.61 KG/M2 | HEIGHT: 63 IN

## 2021-06-14 DIAGNOSIS — E66.9 OBESITY (BMI 30-39.9): ICD-10-CM

## 2021-06-14 DIAGNOSIS — I10 HYPERTENSION, ESSENTIAL: ICD-10-CM

## 2021-06-14 PROCEDURE — 99999 PR OFFICE/OUTPT VISIT,PROCEDURE ONLY: CPT

## 2021-06-14 RX ORDER — ACETAMINOPHEN 160 MG
TABLET,DISINTEGRATING ORAL
COMMUNITY
End: 2021-07-06

## 2021-06-14 NOTE — PROGRESS NOTES
Hayley Montero is a 48 y.o. female with a date of birth of 1965. Vitals:    06/14/21 0947   BP: 126/85   Pulse: 80   Weight: 201 lb (91.2 kg)   Height: 5' 2.5\" (1.588 m)    BMI: Body mass index is 36.18 kg/m². Obesity Classification: Class II    Weight History: Wt Readings from Last 3 Encounters:   06/14/21 201 lb (91.2 kg)   09/01/20 197 lb (89.4 kg)   08/14/20 203 lb 3.2 oz (92.2 kg)     Patient's lowest adult weight was 140 lb at age 29. Patient's highest adult weight was 208 lb at age 47. Patient has participated in the following weight loss programs: Cognitum Diet, [employer], , Sugarbusters, low fat, calorie restriction, HCG, Faster Way to Fat Loss (intermittent fasting, carb cycling, tracking macros) and low carb. Patient has participated in meal replacement/liquid diets - Optavia (lost hair). Patient has participated in weight loss medications - Adipex in early 35s    Patient is not lactose intolerant. Patient does not have Zoroastrianism/cultural food concerns. Patient does not have food allergies. Pt reports good sleep routine with Seroquel. Reports constipation at times d/t IBS. 24 hour recall/food frequency chart:  Breakfast: yes. - Protein shake with US almond milk  Snack: sometimes - almonds + apple  Lunch: yes. - usually packs lunch for work - turkey sandwich (sandwich thin with 3oz turkey, cheese, ireland) OR Chik Myles A lemon kale salad with grilled nuggets or chix strips  Snack: yes. Candy OR sweets  Dinner: yes. - 5oz blackened grouper + 3/4c rice +1c broccoli OR lean pork with veggies  Snack: occasionally - 1c ice cream + cupcake  Drinks throughout the day: coffee with SF creamer / water / Diet soft drinks / US tea  Do you drink alcohol? yes. How often/how much alcohol do you drink: 1-2 drinks a couple times per month.     Exercise: strength / [place] / walking (Faster Way to Fat Loss) - 0-3x week    Patient does not meet the criteria for binge eating disorder. Patient does have grazing. Patient does not have night eating. Patient does sometimes have a history eating out of boredom. Goals  Weight: 165#  Health Improvement: prevent diabetes, improved cholesterol    Assessment  Nutritional Needs: RMR=(9.99 x 91.2) + (6.25 x 158.8) - (4.92 x 55 y.o.) -161  = 1473 kcal x 1.4 (light / sedentary activity factor)= 2062 kcal - 500 (for 1 lb weight loss/week)= 1562 kcal.    Plan  Plan/Recommendations: General weight loss/lifestyle modification strategies discussed (elicit support from others; identify saboteurs; non-food rewards, etc). Optifast:  Not interested  Diet Medications:  Is interested, noted pt currently on Wellbutrin    Handouts: 1200kcal LCMP, 9\" Plate, Protein Shakes & Bars    PES Statement:  Overweight/Obesity related to increased caloric intake and decreased physical activity as evidenced by BMI. Body mass index is 36.18 kg/m². Will follow up as necessary.     Chelsi Guadalupe RD, LD

## 2021-06-15 ENCOUNTER — TELEMEDICINE (OUTPATIENT)
Dept: BARIATRICS/WEIGHT MGMT | Age: 56
End: 2021-06-15
Payer: COMMERCIAL

## 2021-06-15 DIAGNOSIS — E66.9 CLASS 2 OBESITY: Primary | ICD-10-CM

## 2021-06-15 DIAGNOSIS — R73.03 PREDIABETES: ICD-10-CM

## 2021-06-15 DIAGNOSIS — Z71.3 DIETARY COUNSELING AND SURVEILLANCE: ICD-10-CM

## 2021-06-15 DIAGNOSIS — E78.5 HYPERLIPIDEMIA, UNSPECIFIED HYPERLIPIDEMIA TYPE: ICD-10-CM

## 2021-06-15 PROCEDURE — 99204 OFFICE O/P NEW MOD 45 MIN: CPT | Performed by: FAMILY MEDICINE

## 2021-06-15 ASSESSMENT — ENCOUNTER SYMPTOMS
APNEA: 0
ABDOMINAL PAIN: 0
BLOOD IN STOOL: 0
COUGH: 0
PHOTOPHOBIA: 0
VOMITING: 0
DIARRHEA: 0
CONSTIPATION: 0
ABDOMINAL DISTENTION: 0
SHORTNESS OF BREATH: 0
CHOKING: 0
CHEST TIGHTNESS: 0
WHEEZING: 0
NAUSEA: 0
EYE PAIN: 0

## 2021-06-15 NOTE — PROGRESS NOTES
addressed with patient. Reviewed:  [x] Nutrition and the importance of regular protein intake  [x] Hidden CHO/carbohydrate sources  [x] Alcohol use  [x] Tobacco use  [x] Drug use- Denies   [x] Importance of exercise and reducing sedentary time        Controlled Substance Monitoring:     No flowsheet data found.      No Known Allergies      Current Outpatient Medications:     Cholecalciferol (VITAMIN D3) 50 MCG (2000 UT) CAPS, Take by mouth, Disp: , Rfl:     QUEtiapine (SEROQUEL) 25 MG tablet, TAKE ONE TABLET BY MOUTH ONCE NIGHTLY, Disp: 90 tablet, Rfl: 2    levothyroxine (SYNTHROID) 88 MCG tablet, TAKE ONE TABLET BY MOUTH DAILY, Disp: 90 tablet, Rfl: 1    montelukast (SINGULAIR) 10 MG tablet, TAKE ONE TABLET BY MOUTH ONCE NIGHTLY, Disp: 30 tablet, Rfl: 1    metoprolol succinate (TOPROL XL) 25 MG extended release tablet, TAKE ONE TABLET BY MOUTH DAILY, Disp: 90 tablet, Rfl: 1    atorvastatin (LIPITOR) 10 MG tablet, Take 1 tablet by mouth daily, Disp: 30 tablet, Rfl: 3    buPROPion (WELLBUTRIN XL) 150 MG extended release tablet, Take 2 tablets by mouth every morning, Disp: 180 tablet, Rfl: 3    cyclobenzaprine (FLEXERIL) 10 MG tablet, Take 1 tablet by mouth daily, Disp: 30 tablet, Rfl: 5    metFORMIN (GLUCOPHAGE-XR) 500 MG extended release tablet, Take 1 tablet by mouth daily (with breakfast), Disp: 90 tablet, Rfl: 1    loratadine-pseudoephedrine (CLARITIN-D 24 HOUR)  MG per extended release tablet, Take 1 tablet by mouth daily, Disp: 30 tablet, Rfl: 1    ondansetron (ZOFRAN) 8 MG tablet, Take 1 tablet by mouth every 8 hours as needed for Nausea or Vomiting, Disp: 30 tablet, Rfl: 0    fluticasone (FLONASE) 50 MCG/ACT nasal spray, 2 sprays by Nasal route daily, Disp: 1 Bottle, Rfl: 2    Patient Active Problem List   Diagnosis    Essential hypertension    Gestational trophoblastic disease    Other insomnia    Tachycardia- stable on metoprolol     Hypothyroidism    Constipation    Depression  Fatigue    FAITH (generalized anxiety disorder)    Interstitial cystitis    Obesity (BMI 35.0-39.9 without comorbidity)    Severe obesity with body mass index (BMI) of 35.0 to 39.9 with serious comorbidity (HCC)    EDWIGE (stress urinary incontinence, female)    Irritable bowel syndrome with constipation    Obesity (BMI 30-39. 9)    Hypertension, essential       Past Medical History:   Diagnosis Date    Back pain     Depression     Fibromyalgia     Hypertension     Hypertension, essential     Hypothyroidism     Hypothyroidism     IBS (irritable bowel syndrome)     with constipation    Insomnia     Mild reactive airways disease     Muscle cramps     Obesity (BMI 30-39. 9)     Tachycardia        Past Surgical History:   Procedure Laterality Date    DILATION AND CURETTAGE OF UTERUS      HYSTERECTOMY, TOTAL ABDOMINAL      SINUS SURGERY         Family History   Problem Relation Age of Onset    Dementia Mother         breast cancer    Breast Cancer Mother     Arthritis Mother     Other Father         MVA    Alcohol Abuse Father     Alzheimer's Disease Maternal Grandmother     Heart Failure Maternal Grandfather     Hypertension Maternal Grandfather        Review of Systems   Constitutional: Negative for fatigue. Eyes: Negative for photophobia, pain and visual disturbance. Respiratory: Negative for apnea, cough, choking, chest tightness, shortness of breath and wheezing. Cardiovascular: Negative for chest pain, palpitations and leg swelling. Gastrointestinal: Negative for abdominal distention, abdominal pain, blood in stool, constipation, diarrhea, nausea and vomiting. Endocrine: Negative for cold intolerance and heat intolerance. Musculoskeletal: Negative for arthralgias and myalgias. Skin: Negative for rash. Neurological: Negative for dizziness, tremors, syncope, weakness, numbness and headaches.    Psychiatric/Behavioral: Negative for agitation, confusion, decreased concentration, dysphoric mood, hallucinations, sleep disturbance and suicidal ideas. The patient is not nervous/anxious and is not hyperactive. Physical Exam  Constitutional:       Appearance: She is well-developed. HENT:      Head: Normocephalic. Eyes:      Conjunctiva/sclera: Conjunctivae normal.   Abdominal:      General: Abdomen is protuberant. Musculoskeletal:         General: No swelling. Neurological:      Mental Status: She is alert and oriented to person, place, and time. Psychiatric:         Mood and Affect: Mood normal.         Behavior: Behavior normal.         Thought Content: Thought content normal.         Judgment: Judgment normal.         Orders Only on 09/10/2020   Component Date Value Ref Range Status    Cholesterol, Total 09/10/2020 208* 0 - 199 mg/dL Final    Triglycerides 09/10/2020 176* 0 - 150 mg/dL Final    HDL 09/10/2020 38* 40 - 60 mg/dL Final    LDL Calculated 09/10/2020 135* <100 mg/dL Final    VLDL Cholesterol Calculated 09/10/2020 35  Not Established mg/dL Final    Hemoglobin A1C 09/10/2020 5.9  See comment % Final    Comment: Comment:  Diagnosis of Diabetes: > or = 6.5%  Increased risk of diabetes (Prediabetes): 5.7-6.4%  Glycemic Control: Nonpregnant Adults: <7.0%                    Pregnant: <6.0%        eAG 09/10/2020 122.6  mg/dL Final    Sodium 09/10/2020 140  136 - 145 mmol/L Final    Potassium 09/10/2020 4.4  3.5 - 5.1 mmol/L Final    Chloride 09/10/2020 102  99 - 110 mmol/L Final    CO2 09/10/2020 26  21 - 32 mmol/L Final    Anion Gap 09/10/2020 12  3 - 16 Final    Glucose 09/10/2020 107* 70 - 99 mg/dL Final    BUN 09/10/2020 16  7 - 20 mg/dL Final    CREATININE 09/10/2020 0.8  0.6 - 1.1 mg/dL Final    GFR Non- 09/10/2020 >60  >60 Final    Comment: >60 mL/min/1.73m2 EGFR, calc. for ages 25 and older using the  MDRD formula (not corrected for weight), is valid for stable  renal function.       GFR  09/10/2020 >60 >60 Final    Comment: Chronic Kidney Disease: less than 60 ml/min/1.73 sq.m. Kidney Failure: less than 15 ml/min/1.73 sq.m. Results valid for patients 18 years and older.  Calcium 09/10/2020 10.0  8.3 - 10.6 mg/dL Final    Total Protein 09/10/2020 6.8  6.4 - 8.2 g/dL Final    Albumin 09/10/2020 4.4  3.4 - 5.0 g/dL Final    Albumin/Globulin Ratio 09/10/2020 1.8  1.1 - 2.2 Final    Total Bilirubin 09/10/2020 0.3  0.0 - 1.0 mg/dL Final    Alkaline Phosphatase 09/10/2020 68  40 - 129 U/L Final    ALT 09/10/2020 12  10 - 40 U/L Final    AST 09/10/2020 15  15 - 37 U/L Final    Globulin 09/10/2020 2.4  g/dL Final    WBC 09/10/2020 7.8  4.0 - 11.0 K/uL Final    RBC 09/10/2020 4.83  4.00 - 5.20 M/uL Final    Hemoglobin 09/10/2020 13.9  12.0 - 16.0 g/dL Final    Hematocrit 09/10/2020 42.7  36.0 - 48.0 % Final    MCV 09/10/2020 88.3  80.0 - 100.0 fL Final    MCH 09/10/2020 28.7  26.0 - 34.0 pg Final    MCHC 09/10/2020 32.5  31.0 - 36.0 g/dL Final    RDW 09/10/2020 13.5  12.4 - 15.4 % Final    Platelets 25/94/4691 270  135 - 450 K/uL Final    MPV 09/10/2020 9.6  5.0 - 10.5 fL Final    Neutrophils % 09/10/2020 45.5  % Final    Lymphocytes % 09/10/2020 44.9  % Final    Monocytes % 09/10/2020 5.7  % Final    Eosinophils % 09/10/2020 3.7  % Final    Basophils % 09/10/2020 0.2  % Final    Neutrophils Absolute 09/10/2020 3.5  1.7 - 7.7 K/uL Final    Lymphocytes Absolute 09/10/2020 3.5  1.0 - 5.1 K/uL Final    Monocytes Absolute 09/10/2020 0.4  0.0 - 1.3 K/uL Final    Eosinophils Absolute 09/10/2020 0.3  0.0 - 0.6 K/uL Final    Basophils Absolute 09/10/2020 0.0  0.0 - 0.2 K/uL Final         Assessment and Plan:    1. Class 2 obesity    Heavily counseled on the importance of therapeutic lifestyle changes through diet and exercise. The patient understands that the goal of treatment is to reach and stay at a healthy weight.  The initial treatment goal is to lose at least 5-10% of her body weight in 12 weeks. This will require changes in eating habits, increased physical activity, and behavior changes. Counseled on low carb/andriy diet. Patient handouts and education material reviewed in detail with the patient. All questions answered. Encouraged patient to keep a food journal and to bring it to her next visit. Discussed available treatment options in addition to lifestyle changes including medications (Qsymia, Saxenda, or Le mars) or OPTIFAST. She is interested in medications. Qsymia may be a good option as long as she monitor her heart rate and it doesn't cause tachycardia. She understands that medications are most effective as part of a comprehensive treatment plan that includes nutrition, physical activity, and behavior modification. The patient also understands that she will need close follow-ups every 2-4 weeks if she starts treatment. Depending on the patient's success with these changes, she may also be a good candidate for bariatric surgery down the line. Follow-up in 2 weeks to discuss lab results and treatment plan. Patient advised that it is her responsibility to follow up on any ordered tests/lab results. Patient understands and agrees with the plan. 2. Dietary counseling and surveillance  1200-Andriy/low carb meal plan. 3. Prediabetes  Reinforced low carb diet and exercise. On metformin. Check labs. 4. Hyperlipidemia, unspecified hyperlipidemia type  As above. Nutrition:  [] LCHF/Ketogenic [x] Low carb/low-calorie diet [] Low-calorie diet     []Maintenance        FITTE:   [x] Cardio [] Resistance/stength exercises   [x] ACSM recommendations (150 minutes/week)           Behavior:   [x] Motivational interviewing performed    [] Referral for counseling  [x] Discussed strategies to overcome habits/challenges for focus      [x] Stress management   [x] Stimulus control  [] Sleep hygiene      No orders of the defined types were placed in this encounter. No follow-ups on file.

## 2021-06-17 ENCOUNTER — NURSE ONLY (OUTPATIENT)
Dept: FAMILY MEDICINE CLINIC | Age: 56
End: 2021-06-17

## 2021-06-17 DIAGNOSIS — E78.5 HYPERLIPIDEMIA, UNSPECIFIED HYPERLIPIDEMIA TYPE: ICD-10-CM

## 2021-06-17 DIAGNOSIS — E66.9 CLASS 2 OBESITY: ICD-10-CM

## 2021-06-17 DIAGNOSIS — R73.03 PREDIABETES: ICD-10-CM

## 2021-06-17 LAB
A/G RATIO: 1.8 (ref 1.1–2.2)
ALBUMIN SERPL-MCNC: 4.6 G/DL (ref 3.4–5)
ALP BLD-CCNC: 83 U/L (ref 40–129)
ALT SERPL-CCNC: 15 U/L (ref 10–40)
ANION GAP SERPL CALCULATED.3IONS-SCNC: 12 MMOL/L (ref 3–16)
AST SERPL-CCNC: 16 U/L (ref 15–37)
BILIRUB SERPL-MCNC: 0.5 MG/DL (ref 0–1)
BUN BLDV-MCNC: 20 MG/DL (ref 7–20)
CALCIUM SERPL-MCNC: 9.5 MG/DL (ref 8.3–10.6)
CHLORIDE BLD-SCNC: 101 MMOL/L (ref 99–110)
CHOLESTEROL, TOTAL: 148 MG/DL (ref 0–199)
CO2: 28 MMOL/L (ref 21–32)
CREAT SERPL-MCNC: 0.9 MG/DL (ref 0.6–1.1)
FOLATE: >20 NG/ML (ref 4.78–24.2)
GFR AFRICAN AMERICAN: >60
GFR NON-AFRICAN AMERICAN: >60
GLOBULIN: 2.5 G/DL
GLUCOSE BLD-MCNC: 98 MG/DL (ref 70–99)
HDLC SERPL-MCNC: 41 MG/DL (ref 40–60)
LDL CHOLESTEROL CALCULATED: 84 MG/DL
POTASSIUM SERPL-SCNC: 4.3 MMOL/L (ref 3.5–5.1)
SODIUM BLD-SCNC: 141 MMOL/L (ref 136–145)
TOTAL PROTEIN: 7.1 G/DL (ref 6.4–8.2)
TRIGL SERPL-MCNC: 115 MG/DL (ref 0–150)
TSH REFLEX: 2.28 UIU/ML (ref 0.27–4.2)
VITAMIN B-12: 561 PG/ML (ref 211–911)
VITAMIN D 25-HYDROXY: 58.2 NG/ML
VLDLC SERPL CALC-MCNC: 23 MG/DL

## 2021-06-18 LAB
ESTIMATED AVERAGE GLUCOSE: 122.6 MG/DL
HBA1C MFR BLD: 5.9 %

## 2021-06-22 ENCOUNTER — TELEMEDICINE (OUTPATIENT)
Dept: BARIATRICS/WEIGHT MGMT | Age: 56
End: 2021-06-22
Payer: COMMERCIAL

## 2021-06-22 ENCOUNTER — TELEPHONE (OUTPATIENT)
Dept: BARIATRICS/WEIGHT MGMT | Age: 56
End: 2021-06-22

## 2021-06-22 DIAGNOSIS — Z71.3 DIETARY COUNSELING AND SURVEILLANCE: ICD-10-CM

## 2021-06-22 DIAGNOSIS — R73.03 PREDIABETES: ICD-10-CM

## 2021-06-22 DIAGNOSIS — E66.9 CLASS 2 OBESITY: Primary | ICD-10-CM

## 2021-06-22 PROCEDURE — 99214 OFFICE O/P EST MOD 30 MIN: CPT | Performed by: FAMILY MEDICINE

## 2021-06-22 RX ORDER — PHENTERMINE AND TOPIRAMATE 7.5; 46 MG/1; MG/1
1 CAPSULE, EXTENDED RELEASE ORAL DAILY
Qty: 30 CAPSULE | Refills: 0 | Status: SHIPPED | OUTPATIENT
Start: 2021-06-22 | End: 2021-07-22

## 2021-06-22 RX ORDER — PHENTERMINE AND TOPIRAMATE 3.75; 23 MG/1; MG/1
1 CAPSULE, EXTENDED RELEASE ORAL DAILY
Qty: 14 CAPSULE | Refills: 0 | Status: SHIPPED | OUTPATIENT
Start: 2021-06-22 | End: 2021-07-06

## 2021-06-22 ASSESSMENT — ENCOUNTER SYMPTOMS
APNEA: 0
DIARRHEA: 0
WHEEZING: 0
VOMITING: 0
CONSTIPATION: 0
NAUSEA: 0
COUGH: 0
CHOKING: 0
PHOTOPHOBIA: 0
ABDOMINAL PAIN: 0
ABDOMINAL DISTENTION: 0
BLOOD IN STOOL: 0
SHORTNESS OF BREATH: 0
CHEST TIGHTNESS: 0
EYE PAIN: 0

## 2021-06-22 NOTE — TELEPHONE ENCOUNTER
Spoke to pt, pt needs to create profile at [facility]. Once created contact [employer] to link their Rx to the profile, then able to process from there. Must call back when they get meds and take the first dose to schedule the 2wk follow up from that first dose.

## 2021-06-22 NOTE — PROGRESS NOTES
Patient: Yaz Irene                      Encounter Date: 6/22/2021    YOB: 1965                Age: 48 y.o. Chief Complaint   Patient presents with    Weight Management     F/u MWM         Patient identification was verified at the start of the visit. No flowsheet data found. BP Readings from Last 1 Encounters:   06/14/21 126/85       BMI Readings from Last 1 Encounters:   06/14/21 36.18 kg/m²       Pulse Readings from Last 1 Encounters:   06/14/21 80           Wt Readings from Last 3 Encounters:   06/14/21 201 lb (91.2 kg)   09/01/20 197 lb (89.4 kg)   08/14/20 203 lb 3.2 oz (92.2 kg)     Self-reported weight: 201 pounds     HPI: 48 y.o. female with a long-standing history of obesity presents today for virtual video follow-up. Her weight is stable since her last visit on 6/15. Current treatment includes low carb/tod diet. Mindful about dietary choices. Motivated to continue lose weight. Interested in medications to help with appetite regulation. Labs 6/17:  HbA1c 5.9        No Known Allergies      Current Outpatient Medications:     Phentermine-Topiramate (QSYMIA) 3.75-23 MG CP24, Take 1 capsule by mouth daily for 14 days. , Disp: 14 capsule, Rfl: 0    Phentermine-Topiramate (QSYMIA) 7.5-46 MG CP24, Take 1 capsule by mouth daily for 30 days. , Disp: 30 capsule, Rfl: 0    Cholecalciferol (VITAMIN D3) 50 MCG (2000 UT) CAPS, Take by mouth, Disp: , Rfl:     QUEtiapine (SEROQUEL) 25 MG tablet, TAKE ONE TABLET BY MOUTH ONCE NIGHTLY, Disp: 90 tablet, Rfl: 2    levothyroxine (SYNTHROID) 88 MCG tablet, TAKE ONE TABLET BY MOUTH DAILY, Disp: 90 tablet, Rfl: 1    montelukast (SINGULAIR) 10 MG tablet, TAKE ONE TABLET BY MOUTH ONCE NIGHTLY, Disp: 30 tablet, Rfl: 1    metoprolol succinate (TOPROL XL) 25 MG extended release tablet, TAKE ONE TABLET BY MOUTH DAILY, Disp: 90 tablet, Rfl: 1    atorvastatin (LIPITOR) 10 MG tablet, Take 1 tablet by mouth daily, Disp: 30 tablet, Rfl: 3   buPROPion (WELLBUTRIN XL) 150 MG extended release tablet, Take 2 tablets by mouth every morning, Disp: 180 tablet, Rfl: 3    cyclobenzaprine (FLEXERIL) 10 MG tablet, Take 1 tablet by mouth daily, Disp: 30 tablet, Rfl: 5    metFORMIN (GLUCOPHAGE-XR) 500 MG extended release tablet, Take 1 tablet by mouth daily (with breakfast), Disp: 90 tablet, Rfl: 1    loratadine-pseudoephedrine (CLARITIN-D 24 HOUR)  MG per extended release tablet, Take 1 tablet by mouth daily, Disp: 30 tablet, Rfl: 1    ondansetron (ZOFRAN) 8 MG tablet, Take 1 tablet by mouth every 8 hours as needed for Nausea or Vomiting, Disp: 30 tablet, Rfl: 0    fluticasone (FLONASE) 50 MCG/ACT nasal spray, 2 sprays by Nasal route daily, Disp: 1 Bottle, Rfl: 2    Patient Active Problem List   Diagnosis    Essential hypertension    Gestational trophoblastic disease    Other insomnia    Tachycardia    Hypothyroidism    Constipation    Depression    Fatigue    FAITH (generalized anxiety disorder)    Interstitial cystitis    Obesity (BMI 35.0-39.9 without comorbidity)    Severe obesity with body mass index (BMI) of 35.0 to 39.9 with serious comorbidity (HCC)    EDWIGE (stress urinary incontinence, female)    Irritable bowel syndrome with constipation    Obesity (BMI 30-39. 9)    Hypertension, essential       Review of Systems   Constitutional: Negative for fatigue. Eyes: Negative for photophobia, pain and visual disturbance. Respiratory: Negative for apnea, cough, choking, chest tightness, shortness of breath and wheezing. Cardiovascular: Negative for chest pain, palpitations and leg swelling. Gastrointestinal: Negative for abdominal distention, abdominal pain, blood in stool, constipation, diarrhea, nausea and vomiting. Endocrine: Negative for cold intolerance and heat intolerance. Musculoskeletal: Negative for arthralgias and myalgias. Skin: Negative for rash.    Neurological: Negative for dizziness, tremors, syncope, weakness, numbness and headaches. Psychiatric/Behavioral: Negative for agitation, confusion, decreased concentration, dysphoric mood, hallucinations, sleep disturbance and suicidal ideas. The patient is not nervous/anxious and is not hyperactive. Physical Exam  Constitutional:       Appearance: She is well-developed. HENT:      Head: Normocephalic. Eyes:      Conjunctiva/sclera: Conjunctivae normal.   Abdominal:      General: Abdomen is protuberant. Musculoskeletal:         General: No swelling. Neurological:      Mental Status: She is alert and oriented to person, place, and time. Psychiatric:         Mood and Affect: Mood normal.         Behavior: Behavior normal.         Thought Content:  Thought content normal.         Judgment: Judgment normal.         Orders Only on 06/17/2021   Component Date Value Ref Range Status    Cholesterol, Total 06/17/2021 148  0 - 199 mg/dL Final    Triglycerides 06/17/2021 115  0 - 150 mg/dL Final    HDL 06/17/2021 41  40 - 60 mg/dL Final    LDL Calculated 06/17/2021 84  <100 mg/dL Final    VLDL Cholesterol Calculated 06/17/2021 23  Not Established mg/dL Final    Hemoglobin A1C 06/17/2021 5.9  See comment % Final    Comment: Comment:  Diagnosis of Diabetes: > or = 6.5%  Increased risk of diabetes (Prediabetes): 5.7-6.4%  Glycemic Control: Nonpregnant Adults: <7.0%                    Pregnant: <6.0%        eAG 06/17/2021 122.6  mg/dL Final    Sodium 06/17/2021 141  136 - 145 mmol/L Final    Potassium 06/17/2021 4.3  3.5 - 5.1 mmol/L Final    Chloride 06/17/2021 101  99 - 110 mmol/L Final    CO2 06/17/2021 28  21 - 32 mmol/L Final    Anion Gap 06/17/2021 12  3 - 16 Final    Glucose 06/17/2021 98  70 - 99 mg/dL Final    BUN 06/17/2021 20  7 - 20 mg/dL Final    CREATININE 06/17/2021 0.9  0.6 - 1.1 mg/dL Final    GFR Non- 06/17/2021 >60  >60 Final    Comment: >60 mL/min/1.73m2 EGFR, calc. for ages 25 and older using the  MDRD formula (not corrected for weight), is valid for stable  renal function.  GFR  06/17/2021 >60  >60 Final    Comment: Chronic Kidney Disease: less than 60 ml/min/1.73 sq.m. Kidney Failure: less than 15 ml/min/1.73 sq.m. Results valid for patients 18 years and older.  Calcium 06/17/2021 9.5  8.3 - 10.6 mg/dL Final    Total Protein 06/17/2021 7.1  6.4 - 8.2 g/dL Final    Albumin 06/17/2021 4.6  3.4 - 5.0 g/dL Final    Albumin/Globulin Ratio 06/17/2021 1.8  1.1 - 2.2 Final    Total Bilirubin 06/17/2021 0.5  0.0 - 1.0 mg/dL Final    Alkaline Phosphatase 06/17/2021 83  40 - 129 U/L Final    ALT 06/17/2021 15  10 - 40 U/L Final    AST 06/17/2021 16  15 - 37 U/L Final    Globulin 06/17/2021 2.5  g/dL Final    Vit D, 25-Hydroxy 06/17/2021 58.2  >=30 ng/mL Final    Comment: <=20 ng/mL. ........... Laura Linger Deficient  21-29 ng/mL. ......... Laura Linger Insufficient  >=30 ng/mL. ........ Laura Linger Sufficient      Vitamin B-12 06/17/2021 561  211 - 911 pg/mL Final    Folate 06/17/2021 >20.00  4.78 - 24.20 ng/mL Final    Comment: Effective 11-15-16 10:00am EST  Please note reference ranges have  changed for Folate.  TSH 06/17/2021 2.28  0.27 - 4.20 uIU/mL Final         Assessment and Plan:  1. Class 2 obesity  The patient is an appropriate candidate for weight loss. Losing weight will help the patient avoid/improve obesity related comorbidities. Discussed risks, benefits and alternatives of Qsymia. Patient meets BMI criteria, confirms negative pregnancy status, denies any significant coronary artery disease, glaucoma or hyperthyroidism. she denies MAOI use within the past 14 days and has no known hypersensitivity to the sympathomimetic amines, kidney or liver impairment. Start Qsymia 3.75 mg/23 mg once daily in the morning for two weeks and then follow-up in two weeks to determine further dosing.     Explained to patient that I will monitor her weight loss every 12 weeks and if she has not lost at least 5% of her body weight, that I will either increase the medication or discontinue it. Counseled patient on proper use and potential side effects. Explained to patient that the maximum dose of this medication will need to be tapered when she is ready to discontinue it. she understands that abrupt cessation of this dose may cause adverse reactions including seizures. she understands that it is her  responsibility to make sure that she does not run out of medications and to follow up to her appointments every 24 weeks as recommended. Heavily counseled on the importance of therapeutic lifestyle changes through diet and exercise. Discussed possible side effects of palpitations, irritability, paresthesias, dizziness, dysgeusia, insomnia, constipation and dry mouth. Patient is responsible for keeping her monthly appointments. Failure to comply with her monthly visits will result in discontinuing the Qsymia. Patient advised to report any side effects.       - Phentermine-Topiramate (QSYMIA) 3.75-23 MG CP24; Take 1 capsule by mouth daily for 14 days. Dispense: 14 capsule; Refill: 0  - Phentermine-Topiramate (QSYMIA) 7.5-46 MG CP24; Take 1 capsule by mouth daily for 30 days. Dispense: 30 capsule; Refill: 0    2. Dietary counseling and surveillance  1200-Andriy/low carb meal plan. 3. Prediabetes  Stable on metformin. Reinforced low carb diet and exercise.           Nutrition Plan: [] LCHF/Ketogenic   [x] Modified low-calorie diet (low carb/low-andriy)               [] Low-calorie diet    [] Maintenance       []Other        Exercise: [x] Cardio     [x] Resistance/strength training                       [x] ACSM recommendations (150 minutes/week in active weight loss)               Behavior: [x] Motivational interviewing performed    [] Referralfor counseling                         [x] Discussed strategies to overcome habits/challenges for focus         [] Stress management   [x] Stimulus control         [] Sleep hygiene follow-ups on file. Ana Knutson is a 48 y.o. female being evaluated by a Virtual Visit (video visit) encounter to address concerns as mentioned above. A caregiver was present when appropriate. Due to this being a TeleHealth encounter (During SBCRF-11 public health emergency), evaluation of the following organ systems was limited: Vitals/Constitutional/EENT/Resp/CV/GI//MS/Neuro/Skin/Heme-Lymph-Imm. Pursuant to the emergency declaration under the [address] [place] [address], [address] [place] [address] authority and the [employer] and [employer] Act, this Virtual Visit was conducted with patient's (and/or legal guardian's) consent, to reduce the patient's risk of exposure to COVID-19 and provide necessary medical care. The patient (and/or legal guardian) has also been advised to contact this office for worsening conditions or problems, and seek emergency medical treatment and/or call 911 if deemed necessary.

## 2021-07-06 ENCOUNTER — OFFICE VISIT (OUTPATIENT)
Dept: FAMILY MEDICINE CLINIC | Age: 56
End: 2021-07-06
Payer: COMMERCIAL

## 2021-07-06 VITALS
BODY MASS INDEX: 36.62 KG/M2 | SYSTOLIC BLOOD PRESSURE: 118 MMHG | HEIGHT: 62 IN | HEART RATE: 77 BPM | DIASTOLIC BLOOD PRESSURE: 82 MMHG | WEIGHT: 199 LBS | OXYGEN SATURATION: 98 % | TEMPERATURE: 97.3 F

## 2021-07-06 DIAGNOSIS — R25.2 MUSCLE CRAMPS: ICD-10-CM

## 2021-07-06 DIAGNOSIS — G47.09 OTHER INSOMNIA: ICD-10-CM

## 2021-07-06 DIAGNOSIS — R00.0 TACHYCARDIA: ICD-10-CM

## 2021-07-06 DIAGNOSIS — E66.9 OBESITY (BMI 35.0-39.9 WITHOUT COMORBIDITY): ICD-10-CM

## 2021-07-06 DIAGNOSIS — N30.10 INTERSTITIAL CYSTITIS: ICD-10-CM

## 2021-07-06 DIAGNOSIS — I10 HYPERTENSION, ESSENTIAL: ICD-10-CM

## 2021-07-06 DIAGNOSIS — F33.1 MODERATE EPISODE OF RECURRENT MAJOR DEPRESSIVE DISORDER (HCC): ICD-10-CM

## 2021-07-06 DIAGNOSIS — K58.1 IRRITABLE BOWEL SYNDROME WITH CONSTIPATION: ICD-10-CM

## 2021-07-06 DIAGNOSIS — Z00.00 ANNUAL PHYSICAL EXAM: Primary | ICD-10-CM

## 2021-07-06 DIAGNOSIS — E03.9 HYPOTHYROIDISM, UNSPECIFIED TYPE: ICD-10-CM

## 2021-07-06 PROBLEM — O01.9 GESTATIONAL TROPHOBLASTIC DISEASE: Status: RESOLVED | Noted: 2018-04-27 | Resolved: 2021-07-06

## 2021-07-06 PROCEDURE — 90750 HZV VACC RECOMBINANT IM: CPT | Performed by: NURSE PRACTITIONER

## 2021-07-06 PROCEDURE — 90471 IMMUNIZATION ADMIN: CPT | Performed by: NURSE PRACTITIONER

## 2021-07-06 PROCEDURE — 99396 PREV VISIT EST AGE 40-64: CPT | Performed by: NURSE PRACTITIONER

## 2021-07-06 RX ORDER — CYCLOBENZAPRINE HCL 10 MG
10 TABLET ORAL DAILY
Qty: 90 TABLET | Refills: 2 | Status: SHIPPED | OUTPATIENT
Start: 2021-07-06 | End: 2021-11-11 | Stop reason: SDUPTHER

## 2021-07-06 ASSESSMENT — PATIENT HEALTH QUESTIONNAIRE - PHQ9
SUM OF ALL RESPONSES TO PHQ9 QUESTIONS 1 & 2: 0
SUM OF ALL RESPONSES TO PHQ QUESTIONS 1-9: 0
2. FEELING DOWN, DEPRESSED OR HOPELESS: 0
SUM OF ALL RESPONSES TO PHQ QUESTIONS 1-9: 0
1. LITTLE INTEREST OR PLEASURE IN DOING THINGS: 0
SUM OF ALL RESPONSES TO PHQ QUESTIONS 1-9: 0

## 2021-07-06 ASSESSMENT — ENCOUNTER SYMPTOMS: NAUSEA: 1

## 2021-07-06 NOTE — PROGRESS NOTES
Subjective:      Patient ID: Mehreen Browning is a 48 y.o. y.o. female. HPI    Mehreen Browning is here for a physical.    Chief Complaint   Patient presents with    Annual Exam       Vitals:    07/06/21 0917   BP: 118/82   Pulse: 77   Temp: 97.3 °F (36.3 °C)   SpO2: 98%       Wt Readings from Last 3 Encounters:   07/06/21 199 lb (90.3 kg)   06/14/21 201 lb (91.2 kg)   09/01/20 197 lb (89.4 kg)       Past Medical History:   Diagnosis Date    Back pain     Depression     Fibromyalgia     Hypertension     Hypertension, essential     Hypothyroidism     Hypothyroidism     IBS (irritable bowel syndrome)     with constipation    Insomnia     Mild reactive airways disease     Muscle cramps     Obesity (BMI 30-39. 9)     Tachycardia        Past Surgical History:   Procedure Laterality Date    DILATION AND CURETTAGE OF UTERUS      HYSTERECTOMY, TOTAL ABDOMINAL      SINUS SURGERY         Current Outpatient Medications   Medication Sig Dispense Refill    cyclobenzaprine (FLEXERIL) 10 MG tablet Take 1 tablet by mouth daily 90 tablet 2    Plecanatide 3 MG TABS Take 3 mg by mouth daily 30 tablet 3    Phentermine-Topiramate (QSYMIA) 3.75-23 MG CP24 Take 1 capsule by mouth daily for 14 days. 14 capsule 0    Phentermine-Topiramate (QSYMIA) 7.5-46 MG CP24 Take 1 capsule by mouth daily for 30 days.  30 capsule 0    QUEtiapine (SEROQUEL) 25 MG tablet TAKE ONE TABLET BY MOUTH ONCE NIGHTLY 90 tablet 2    levothyroxine (SYNTHROID) 88 MCG tablet TAKE ONE TABLET BY MOUTH DAILY 90 tablet 1    metoprolol succinate (TOPROL XL) 25 MG extended release tablet TAKE ONE TABLET BY MOUTH DAILY 90 tablet 1    atorvastatin (LIPITOR) 10 MG tablet Take 1 tablet by mouth daily 30 tablet 3    buPROPion (WELLBUTRIN XL) 150 MG extended release tablet Take 2 tablets by mouth every morning 180 tablet 3    ondansetron (ZOFRAN) 8 MG tablet Take 1 tablet by mouth every 8 hours as needed for Nausea or Vomiting 30 tablet 0    metFORMIN (GLUCOPHAGE-XR) 500 MG extended release tablet Take 1 tablet by mouth daily (with breakfast) 90 tablet 1    fluticasone (FLONASE) 50 MCG/ACT nasal spray 2 sprays by Nasal route daily 1 Bottle 2     No current facility-administered medications for this visit. Family History   Problem Relation Age of Onset   [facility] Cancer Mother     Dementia Mother         breast cancer    Breast Cancer Mother     Arthritis Mother     Other Father         MVA    Alcohol Abuse Father     Alzheimer's Disease Maternal Grandmother     Heart Failure Maternal Grandfather     Hypertension Maternal Grandfather        Social History     Tobacco Use    Smoking status: Never Smoker    Smokeless tobacco: Never Used   Vaping Use    Vaping Use: Never used   Substance Use Topics    Alcohol use:  Yes     Alcohol/week: 1.0 standard drinks     Types: 1 Standard drinks or equivalent per week     Comment: rarely    Drug use: Never       Immunization History   Administered Date(s) Administered    COVID-19, Moderna, PF, 100mcg/0.5mL 12/28/2020, 01/25/2021    Hepatitis A Adult (Havrix, Vaqta) 04/27/2018    Influenza Virus Vaccine 12/07/2011, 10/01/2017    Influenza, Azanadja Fees, IM, (6 mo and older Fluzone, Flulaval, Fluarix and 3 yrs and older Afluria) 10/31/2019, 11/24/2020    PPD Test 01/04/2019    Tdap (Boostrix, Adacel) 02/27/2015, 09/11/2017       Health Maintenance   Topic Date Due    Shingles Vaccine (1 of 2) Never done    Breast cancer screen  04/10/2020    DTaP/Tdap/Td vaccine (3 - Td or Tdap) 09/20/2027 (Originally 9/11/2027)    Flu vaccine (1) 09/01/2021    A1C test (Diabetic or Prediabetic)  06/17/2022    Lipid screen  06/17/2022    TSH testing  06/17/2022    Potassium monitoring  06/17/2022    Creatinine monitoring  06/17/2022    Colon cancer screen colonoscopy  02/24/2026    COVID-19 Vaccine  Completed    Hepatitis A vaccine  Aged Out    Hepatitis B vaccine  Aged Out    Hib vaccine  Aged Out    Meningococcal (ACWY) vaccine  Aged Out    Pneumococcal 0-64 years Vaccine  Aged Out    Hepatitis C screen  Discontinued    HIV screen  Discontinued         Review of Systems   Gastrointestinal: Positive for nausea. Musculoskeletal: Positive for myalgias. All other systems reviewed and are negative. Objective:   Physical Exam  Vitals reviewed. Constitutional:       Appearance: She is well-developed. HENT:      Head: Normocephalic and atraumatic. Right Ear: External ear normal.      Left Ear: External ear normal.      Nose: Nose normal.   Eyes:      General: No scleral icterus. Right eye: No discharge. Left eye: No discharge. Conjunctiva/sclera: Conjunctivae normal.      Pupils: Pupils are equal, round, and reactive to light. Neck:      Thyroid: No thyromegaly. Cardiovascular:      Rate and Rhythm: Normal rate and regular rhythm. Heart sounds: Normal heart sounds. No murmur heard. No friction rub. No gallop. Pulmonary:      Effort: Pulmonary effort is normal. No respiratory distress. Breath sounds: Normal breath sounds. No stridor. No wheezing or rales. Chest:      Chest wall: No tenderness. Abdominal:      General: Bowel sounds are normal. There is no distension. Palpations: Abdomen is soft. There is no mass. Tenderness: There is no abdominal tenderness. There is no guarding or rebound. Hernia: No hernia is present. Musculoskeletal:         General: No tenderness or deformity. Normal range of motion. Cervical back: Normal range of motion and neck supple. Lymphadenopathy:      Cervical: No cervical adenopathy. Skin:     General: Skin is warm and dry. Capillary Refill: Capillary refill takes less than 2 seconds. Coloration: Skin is not pale. Findings: No erythema or rash. Neurological:      Mental Status: She is alert and oriented to person, place, and time. Cranial Nerves: No cranial nerve deficit.       Motor: No abnormal muscle tone. Coordination: Coordination normal.   Psychiatric:         Behavior: Behavior normal.         Thought Content: Thought content normal.         Judgment: Judgment normal.         Assessment:      See ProblemList assessment and plan       Plan:      Diagnosis Orders   1. Annual physical exam     2. Muscle cramps  cyclobenzaprine (FLEXERIL) 10 MG tablet   3. Irritable bowel syndrome with constipation     4. Hypothyroidism, unspecified type     5. Interstitial cystitis     6. Tachycardia     7. Other insomnia     8. Hypertension, essential     9. Moderate episode of recurrent major depressive disorder ([facility] [address]     10. Obesity (BMI 35.0-39.9 without comorbidity)       Irritable bowel syndrome with constipation   Unclear control, changes made today: trulance    Hypothyroidism   Well-controlled, continue current medications    Interstitial cystitis   Asymptomatic, continue current medications and continue current treatment plan    Tachycardia   Well-controlled, continue current medications    Other insomnia   Well-controlled, continue current medications    Hypertension, essential   Well-controlled, continue current medications    Depression  Stable, well controlled on Wellbutrin     Obesity (BMI 35.0-39.9 without comorbidity)   Monitored by specialist- no acute findings meriting change in the plan   Seeing Dr. Ryder Wells with bariatrics      Discussed over the counter medication with patient. Lopez Arora received counseling on the following healthy behaviors: medication adherence    Discussed use, benefit, and side effects of prescribed medications. Barriers to medication compliance addressed. Allpatient q uestions answered. Pt voiced understanding. Medications reviewed and patient understands. Questions answered  Patient engaged in shared decision making. Information given to evaluate options of treatment, understand what is needed and discuss importance of following plan.

## 2021-07-08 ENCOUNTER — TELEPHONE (OUTPATIENT)
Dept: FAMILY MEDICINE CLINIC | Age: 56
End: 2021-07-08

## 2021-07-08 RX ORDER — AMOXICILLIN AND CLAVULANATE POTASSIUM 875; 125 MG/1; MG/1
1 TABLET, FILM COATED ORAL 2 TIMES DAILY
Qty: 14 TABLET | Refills: 0 | Status: SHIPPED | OUTPATIENT
Start: 2021-07-08 | End: 2021-07-15

## 2021-08-06 ENCOUNTER — TELEPHONE (OUTPATIENT)
Dept: BARIATRICS/WEIGHT MGMT | Age: 56
End: 2021-08-06

## 2021-08-06 ENCOUNTER — TELEMEDICINE (OUTPATIENT)
Dept: BARIATRICS/WEIGHT MGMT | Age: 56
End: 2021-08-06
Payer: COMMERCIAL

## 2021-08-06 DIAGNOSIS — Z71.3 DIETARY COUNSELING AND SURVEILLANCE: ICD-10-CM

## 2021-08-06 DIAGNOSIS — E66.9 CLASS 2 OBESITY: Primary | ICD-10-CM

## 2021-08-06 PROCEDURE — 99213 OFFICE O/P EST LOW 20 MIN: CPT | Performed by: FAMILY MEDICINE

## 2021-08-06 RX ORDER — PHENTERMINE AND TOPIRAMATE 7.5; 46 MG/1; MG/1
1 CAPSULE, EXTENDED RELEASE ORAL DAILY
Qty: 30 CAPSULE | Refills: 0 | Status: SHIPPED | OUTPATIENT
Start: 2021-08-06 | End: 2021-09-05

## 2021-08-06 ASSESSMENT — ENCOUNTER SYMPTOMS
WHEEZING: 0
VOMITING: 0
ABDOMINAL DISTENTION: 0
NAUSEA: 0
CONSTIPATION: 0
CHOKING: 0
SHORTNESS OF BREATH: 0
EYE PAIN: 0
BLOOD IN STOOL: 0
PHOTOPHOBIA: 0
APNEA: 0
ABDOMINAL PAIN: 0
CHEST TIGHTNESS: 0
DIARRHEA: 0
COUGH: 0

## 2021-08-06 NOTE — PROGRESS NOTES
Patient: Shelby Mcnamara                      Encounter Date: 8/6/2021    YOB: 1965                Age: 55 y.o. Chief Complaint   Patient presents with    Weight Management     MW         Patient identification was verified at the start of the visit. No flowsheet data found. BP Readings from Last 1 Encounters:   07/06/21 118/82       BMI Readings from Last 1 Encounters:   07/06/21 36.40 kg/m²       Pulse Readings from Last 1 Encounters:   07/06/21 77           Wt Readings from Last 3 Encounters:   07/06/21 199 lb (90.3 kg)   06/14/21 201 lb (91.2 kg)   09/01/20 197 lb (89.4 kg)       Self-reported weight: 196 pounds    HPI: 55 y.o. female with a long-standing history of obesity presents today for virtual video follow-up. she has gained a pound since her last visit. Current treatment includes Qsymia 7.5-46 mg daily. Tolerating it well. Food recall reviewed with the patient. Hasn't been following the prescribed meal plan. Has been dining out more (daughter visiting her from [place]/went out to celebrate her birthday/ drank more alcohol). Getting ready to refocus on get back on track with following the low carb/tod meal plan. Motivated to continue losing weight. Medication(s): Appetite well controlled? [x]Yes      []No    Focus:     []Good     [x]Fair     []Poor    Side effects? No        Any recent change in medication(s)? No    Exercise: []Cardio     []Resistance/strength training     [x]Other: walking     No Known Allergies      Current Outpatient Medications:     Phentermine-Topiramate (QSYMIA) 7.5-46 MG CP24, Take 1 capsule by mouth daily for 30 days. , Disp: 30 capsule, Rfl: 0    cyclobenzaprine (FLEXERIL) 10 MG tablet, Take 1 tablet by mouth daily, Disp: 90 tablet, Rfl: 2    Plecanatide 3 MG TABS, Take 3 mg by mouth daily, Disp: 30 tablet, Rfl: 3    QUEtiapine (SEROQUEL) 25 MG tablet, TAKE ONE TABLET BY MOUTH ONCE NIGHTLY, Disp: 90 tablet, Rfl: 2    levothyroxine (SYNTHROID) 88 MCG tablet, TAKE ONE TABLET BY MOUTH DAILY, Disp: 90 tablet, Rfl: 1    metoprolol succinate (TOPROL XL) 25 MG extended release tablet, TAKE ONE TABLET BY MOUTH DAILY, Disp: 90 tablet, Rfl: 1    atorvastatin (LIPITOR) 10 MG tablet, Take 1 tablet by mouth daily, Disp: 30 tablet, Rfl: 3    buPROPion (WELLBUTRIN XL) 150 MG extended release tablet, Take 2 tablets by mouth every morning, Disp: 180 tablet, Rfl: 3    metFORMIN (GLUCOPHAGE-XR) 500 MG extended release tablet, Take 1 tablet by mouth daily (with breakfast), Disp: 90 tablet, Rfl: 1    ondansetron (ZOFRAN) 8 MG tablet, Take 1 tablet by mouth every 8 hours as needed for Nausea or Vomiting, Disp: 30 tablet, Rfl: 0    fluticasone (FLONASE) 50 MCG/ACT nasal spray, 2 sprays by Nasal route daily, Disp: 1 Bottle, Rfl: 2    Patient Active Problem List   Diagnosis    Essential hypertension    Other insomnia    Tachycardia    Hypothyroidism    Constipation    Depression    Fatigue    FAITH (generalized anxiety disorder)    Interstitial cystitis    Obesity (BMI 35.0-39.9 without comorbidity)    Severe obesity with body mass index (BMI) of 35.0 to 39.9 with serious comorbidity (HCC)    EDWIGE (stress urinary incontinence, female)    Irritable bowel syndrome with constipation    Obesity (BMI 30-39. 9)    Hypertension, essential       Review of Systems   Constitutional: Negative for fatigue. Eyes: Negative for photophobia, pain and visual disturbance. Respiratory: Negative for apnea, cough, choking, chest tightness, shortness of breath and wheezing. Cardiovascular: Negative for chest pain, palpitations and leg swelling. Gastrointestinal: Negative for abdominal distention, abdominal pain, blood in stool, constipation, diarrhea, nausea and vomiting. Endocrine: Negative for cold intolerance and heat intolerance. Musculoskeletal: Negative for arthralgias and myalgias. Skin: Negative for rash.    Neurological: Negative for dizziness, tremors, syncope, weakness, numbness and headaches. Psychiatric/Behavioral: Negative for agitation, confusion, decreased concentration, dysphoric mood, hallucinations, sleep disturbance and suicidal ideas. The patient is not nervous/anxious and is not hyperactive. Physical Exam  Constitutional:       Appearance: She is well-developed. HENT:      Head: Normocephalic. Eyes:      Conjunctiva/sclera: Conjunctivae normal.   Abdominal:      General: Abdomen is protuberant. Musculoskeletal:         General: No swelling. Neurological:      Mental Status: She is alert and oriented to person, place, and time. Psychiatric:         Mood and Affect: Mood normal.         Behavior: Behavior normal.         Thought Content:  Thought content normal.         Judgment: Judgment normal.         Orders Only on 06/17/2021   Component Date Value Ref Range Status    Cholesterol, Total 06/17/2021 148  0 - 199 mg/dL Final    Triglycerides 06/17/2021 115  0 - 150 mg/dL Final    HDL 06/17/2021 41  40 - 60 mg/dL Final    LDL Calculated 06/17/2021 84  <100 mg/dL Final    VLDL Cholesterol Calculated 06/17/2021 23  Not Established mg/dL Final    Hemoglobin A1C 06/17/2021 5.9  See comment % Final    Comment: Comment:  Diagnosis of Diabetes: > or = 6.5%  Increased risk of diabetes (Prediabetes): 5.7-6.4%  Glycemic Control: Nonpregnant Adults: <7.0%                    Pregnant: <6.0%        eAG 06/17/2021 122.6  mg/dL Final    Sodium 06/17/2021 141  136 - 145 mmol/L Final    Potassium 06/17/2021 4.3  3.5 - 5.1 mmol/L Final    Chloride 06/17/2021 101  99 - 110 mmol/L Final    CO2 06/17/2021 28  21 - 32 mmol/L Final    Anion Gap 06/17/2021 12  3 - 16 Final    Glucose 06/17/2021 98  70 - 99 mg/dL Final    BUN 06/17/2021 20  7 - 20 mg/dL Final    CREATININE 06/17/2021 0.9  0.6 - 1.1 mg/dL Final    GFR Non- 06/17/2021 >60  >60 Final    Comment: >60 mL/min/1.73m2 EGFR, calc. for ages 25 and older using the  MDRD formula (not corrected for weight), is valid for stable  renal function.  GFR  06/17/2021 >60  >60 Final    Comment: Chronic Kidney Disease: less than 60 ml/min/1.73 sq.m. Kidney Failure: less than 15 ml/min/1.73 sq.m. Results valid for patients 18 years and older.  Calcium 06/17/2021 9.5  8.3 - 10.6 mg/dL Final    Total Protein 06/17/2021 7.1  6.4 - 8.2 g/dL Final    Albumin 06/17/2021 4.6  3.4 - 5.0 g/dL Final    Albumin/Globulin Ratio 06/17/2021 1.8  1.1 - 2.2 Final    Total Bilirubin 06/17/2021 0.5  0.0 - 1.0 mg/dL Final    Alkaline Phosphatase 06/17/2021 83  40 - 129 U/L Final    ALT 06/17/2021 15  10 - 40 U/L Final    AST 06/17/2021 16  15 - 37 U/L Final    Globulin 06/17/2021 2.5  g/dL Final    Vit D, 25-Hydroxy 06/17/2021 58.2  >=30 ng/mL Final    Comment: <=20 ng/mL. ........... Graylon Felice Deficient  21-29 ng/mL. ......... Graylon Felice Insufficient  >=30 ng/mL. ........ Graylon Felcie Sufficient      Vitamin B-12 06/17/2021 561  211 - 911 pg/mL Final    Folate 06/17/2021 >20.00  4.78 - 24.20 ng/mL Final    Comment: Effective 11-15-16 10:00am EST  Please note reference ranges have  changed for Folate.  TSH 06/17/2021 2.28  0.27 - 4.20 uIU/mL Final         Assessment and Plan:  1. Class 2 obesity  Stable. Reinforced low carb/andriy diet and exercise. Continue Qsymia. Reviewed weight loss goals. F/u in 4 weeks. - Phentermine-Topiramate (QSYMIA) 7.5-46 MG CP24; Take 1 capsule by mouth daily for 30 days. Dispense: 30 capsule; Refill: 0    2. Dietary counseling and surveillance  1200-Andriy/low carb meal plan.           Nutrition Plan: [] LCHF/Ketogenic   [x] Modified low-calorie diet (low carb/low-andriy)               [] Low-calorie diet    [] Maintenance       []Other        Exercise: [x] Cardio     [] Resistance/strength training                       [x] ACSM recommendations (150 minutes/week in active weight loss)               Behavior: [x] Motivational interviewing performed    [] call our office to report any side effects. Females, it is your responsibility to obtain negative pregnancy tests each month. No orders of the defined types were placed in this encounter. No follow-ups on file. Dave Brady is a 55 y.o. female being evaluated by a Virtual Visit (video visit) encounter to address concerns as mentioned above. A caregiver was present when appropriate. Due to this being a TeleHealth encounter (During [facility] public health emergency), evaluation of the following organ systems was limited: Vitals/Constitutional/EENT/Resp/CV/GI//MS/Neuro/Skin/Heme-Lymph-Imm. Pursuant to the emergency declaration under the [address] [place] [address] authority and the [employer] Act, this Virtual Visit was conducted with patient's (and/or legal guardian's) consent, to reduce the patient's risk of exposure to COVID-19 and provide necessary medical care. The patient (and/or legal guardian) has also been advised to contact this office for worsening conditions or problems, and seek emergency medical treatment and/or call 911 if deemed necessary.

## 2021-08-10 DIAGNOSIS — F41.1 GAD (GENERALIZED ANXIETY DISORDER): ICD-10-CM

## 2021-08-10 DIAGNOSIS — F33.1 MODERATE EPISODE OF RECURRENT MAJOR DEPRESSIVE DISORDER (HCC): ICD-10-CM

## 2021-08-10 RX ORDER — BUPROPION HYDROCHLORIDE 150 MG/1
300 TABLET ORAL EVERY MORNING
Qty: 180 TABLET | Refills: 3 | Status: SHIPPED | OUTPATIENT
Start: 2021-08-10 | End: 2021-11-30

## 2021-08-10 RX ORDER — ATORVASTATIN CALCIUM 10 MG/1
TABLET, FILM COATED ORAL
Qty: 30 TABLET | Refills: 0 | Status: SHIPPED | OUTPATIENT
Start: 2021-08-10 | End: 2021-09-14 | Stop reason: SDUPTHER

## 2021-08-10 NOTE — TELEPHONE ENCOUNTER
Medication:   Requested Prescriptions     Pending Prescriptions Disp Refills    buPROPion (WELLBUTRIN XL) 150 MG extended release tablet 180 tablet 3     Sig: Take 2 tablets by mouth every morning        Last Filled:  01/13/21    Patient Phone Number: [phone] (home)     Last appt: 1/5/2021   Next appt: Visit date not found    Last OARRS: No flowsheet data found.

## 2021-08-13 DIAGNOSIS — E03.9 HYPOTHYROIDISM, UNSPECIFIED TYPE: ICD-10-CM

## 2021-08-13 RX ORDER — LEVOTHYROXINE SODIUM 88 UG/1
TABLET ORAL
Qty: 90 TABLET | Refills: 1 | Status: SHIPPED | OUTPATIENT
Start: 2021-08-13 | End: 2021-11-11 | Stop reason: SDUPTHER

## 2021-08-24 ENCOUNTER — TELEPHONE (OUTPATIENT)
Dept: FAMILY MEDICINE CLINIC | Age: 56
End: 2021-08-24

## 2021-08-24 RX ORDER — ALBUTEROL SULFATE 90 UG/1
2 AEROSOL, METERED RESPIRATORY (INHALATION) 4 TIMES DAILY PRN
Qty: 3 INHALER | Refills: 1 | Status: SHIPPED | OUTPATIENT
Start: 2021-08-24 | End: 2022-06-03

## 2021-08-24 RX ORDER — MONTELUKAST SODIUM 10 MG/1
10 TABLET ORAL DAILY
Qty: 30 TABLET | Refills: 3 | Status: SHIPPED | OUTPATIENT
Start: 2021-08-24 | End: 2021-11-30

## 2021-09-14 RX ORDER — ATORVASTATIN CALCIUM 10 MG/1
TABLET, FILM COATED ORAL
Qty: 90 TABLET | Refills: 1 | Status: SHIPPED | OUTPATIENT
Start: 2021-09-14 | End: 2021-11-11 | Stop reason: SDUPTHER

## 2021-09-17 DIAGNOSIS — R00.0 TACHYCARDIA: ICD-10-CM

## 2021-09-17 RX ORDER — METOPROLOL SUCCINATE 25 MG/1
TABLET, EXTENDED RELEASE ORAL
Qty: 90 TABLET | Refills: 1 | Status: SHIPPED | OUTPATIENT
Start: 2021-09-17 | End: 2021-11-11 | Stop reason: SDUPTHER

## 2021-09-21 ENCOUNTER — OFFICE VISIT (OUTPATIENT)
Dept: FAMILY MEDICINE CLINIC | Age: 56
End: 2021-09-21
Payer: COMMERCIAL

## 2021-09-21 DIAGNOSIS — M79.604 PAIN OF RIGHT LOWER EXTREMITY: Primary | ICD-10-CM

## 2021-09-21 PROCEDURE — 99213 OFFICE O/P EST LOW 20 MIN: CPT | Performed by: NURSE PRACTITIONER

## 2021-09-21 NOTE — PROGRESS NOTES
Valeri Staples (:  1965) is a 55 y.o. female,Established patient, here for evaluation of the following chief complaint(s):  No chief complaint on file. ASSESSMENT/PLAN:  1. Pain of right lower extremity  Assessment & Plan:  No concern for DVT, fx, hematoma, injury  Neuropathic in distribution- L5 S1  Suspect 2/2 lumbar spine stenosis  She plans to f/u with chiro  Rec traction therapy  May consider PT and imaging if continues  She does not want to do imaging or steroids at this point  F/U PRN      Return if symptoms worsen or fail to improve. SUBJECTIVE/OBJECTIVE:  Recurrent SI joint pain with radiation to rt lower leg and foot. Noted \"knot\" in rt ant / medial tibia x a few days. This damion feels tender to her.   No injury  No post leg pain  No swelling  Has tried therapulse gun and this relieves some of the knot sensation   No meds      Current Outpatient Medications   Medication Sig Dispense Refill    metoprolol succinate (TOPROL XL) 25 MG extended release tablet TAKE ONE TABLET BY MOUTH DAILY 90 tablet 1    atorvastatin (LIPITOR) 10 MG tablet TAKE ONE TABLET BY MOUTH DAILY 90 tablet 1    albuterol sulfate HFA (VENTOLIN HFA) 108 (90 Base) MCG/ACT inhaler Inhale 2 puffs into the lungs 4 times daily as needed for Wheezing 3 Inhaler 1    montelukast (SINGULAIR) 10 MG tablet Take 1 tablet by mouth daily 30 tablet 3    levothyroxine (SYNTHROID) 88 MCG tablet TAKE ONE TABLET BY MOUTH DAILY 90 tablet 1    buPROPion (WELLBUTRIN XL) 150 MG extended release tablet Take 2 tablets by mouth every morning 180 tablet 3    cyclobenzaprine (FLEXERIL) 10 MG tablet Take 1 tablet by mouth daily 90 tablet 2    Plecanatide 3 MG TABS Take 3 mg by mouth daily 30 tablet 3    QUEtiapine (SEROQUEL) 25 MG tablet TAKE ONE TABLET BY MOUTH ONCE NIGHTLY 90 tablet 2    metFORMIN (GLUCOPHAGE-XR) 500 MG extended release tablet Take 1 tablet by mouth daily (with breakfast) 90 tablet 1    ondansetron (ZOFRAN) 8 MG tablet Take 1 tablet by mouth every 8 hours as needed for Nausea or Vomiting 30 tablet 0    fluticasone (FLONASE) 50 MCG/ACT nasal spray 2 sprays by Nasal route daily 1 Bottle 2     No current facility-administered medications for this visit. Review of Systems   All other systems reviewed and are negative. There were no vitals filed for this visit. Physical Exam  Musculoskeletal:         General: Tenderness present. Normal range of motion. Comments: Mild tenderness to ant proximal tibia, no erythema or ecchymosis. Neg homans  CMS intact                 An electronic signature was used to authenticate this note.     --ALINA Garrison - CNP

## 2021-09-28 ENCOUNTER — HOSPITAL ENCOUNTER (OUTPATIENT)
Dept: MAMMOGRAPHY | Age: 56
Discharge: HOME | End: 2021-09-28
Payer: COMMERCIAL

## 2021-09-28 VITALS — HEIGHT: 62 IN | BODY MASS INDEX: 34.78 KG/M2 | WEIGHT: 189 LBS

## 2021-09-28 DIAGNOSIS — Z12.31 VISIT FOR SCREENING MAMMOGRAM: ICD-10-CM

## 2021-09-28 PROCEDURE — 77063 BREAST TOMOSYNTHESIS BI: CPT

## 2021-10-04 ENCOUNTER — PATIENT MESSAGE (OUTPATIENT)
Dept: FAMILY MEDICINE CLINIC | Age: 56
End: 2021-10-04

## 2021-10-04 DIAGNOSIS — R73.09 ELEVATED GLUCOSE: ICD-10-CM

## 2021-10-04 RX ORDER — METFORMIN HYDROCHLORIDE 500 MG/1
500 TABLET, EXTENDED RELEASE ORAL
Qty: 90 TABLET | Refills: 1 | Status: SHIPPED | OUTPATIENT
Start: 2021-10-04 | End: 2021-11-11 | Stop reason: SDUPTHER

## 2021-10-04 NOTE — TELEPHONE ENCOUNTER
From: Meaghan King  To: ALINA Butterfield CNP  Sent: 10/4/2021 5:54 AM EDT  Subject: Prescription Question    Need refill of metformin sent to [facility]

## 2021-10-05 ENCOUNTER — OFFICE VISIT (OUTPATIENT)
Dept: UROGYNECOLOGY | Age: 56
End: 2021-10-05
Payer: COMMERCIAL

## 2021-10-05 VITALS
RESPIRATION RATE: 14 BRPM | TEMPERATURE: 98.6 F | OXYGEN SATURATION: 97 % | HEART RATE: 78 BPM | DIASTOLIC BLOOD PRESSURE: 100 MMHG | SYSTOLIC BLOOD PRESSURE: 143 MMHG

## 2021-10-05 DIAGNOSIS — K59.02 CONSTIPATION DUE TO OUTLET DYSFUNCTION: ICD-10-CM

## 2021-10-05 DIAGNOSIS — N81.6 RECTOCELE: ICD-10-CM

## 2021-10-05 DIAGNOSIS — R39.15 URINARY URGENCY: Primary | ICD-10-CM

## 2021-10-05 DIAGNOSIS — R15.0 INCOMPLETE DEFECATION: ICD-10-CM

## 2021-10-05 DIAGNOSIS — R35.0 URINARY FREQUENCY: ICD-10-CM

## 2021-10-05 LAB
BILIRUBIN, POC: NORMAL
BLOOD URINE, POC: NORMAL
CLARITY, POC: CLEAR
COLOR, POC: YELLOW
EMPTY COUGH STRESS TEST: NEGATIVE
FIRST SENSATION: 40 CC
FULL COUGH STRESS TEST: NEGATIVE
GLUCOSE URINE, POC: NORMAL
KETONES, POC: NORMAL
LEUKOCYTE EST, POC: NORMAL
MAX SENSATION: 250 CC
NITRATE, URINE POC: NORMAL
NITRITE, POC: NEGATIVE
PH, POC: 6.5
POST VOID RESIDUAL (PVR): 25 ML
PROTEIN, POC: NORMAL
RBC URINE, POC: NORMAL
SECOND SENSATION: 180 CC
SPASM: NEGATIVE
SPECIFIC GRAVITY, POC: 1.01
UROBILINOGEN, POC: NORMAL
WBC URINE, POC: NORMAL

## 2021-10-05 PROCEDURE — 99244 OFF/OP CNSLTJ NEW/EST MOD 40: CPT | Performed by: OBSTETRICS & GYNECOLOGY

## 2021-10-05 PROCEDURE — 51725 SIMPLE CYSTOMETROGRAM: CPT | Performed by: OBSTETRICS & GYNECOLOGY

## 2021-10-05 PROCEDURE — 81002 URINALYSIS NONAUTO W/O SCOPE: CPT | Performed by: OBSTETRICS & GYNECOLOGY

## 2021-10-05 RX ORDER — OXYBUTYNIN CHLORIDE 10 MG/1
10 TABLET, EXTENDED RELEASE ORAL DAILY
Qty: 30 TABLET | Refills: 1 | Status: SHIPPED | OUTPATIENT
Start: 2021-10-05 | End: 2022-06-03

## 2021-10-05 ASSESSMENT — ENCOUNTER SYMPTOMS
RECTAL PAIN: 1
CONSTIPATION: 1

## 2021-10-05 NOTE — PROGRESS NOTES
10/5/2021      HPI:     Name: Renea Mei  YOB: 1965    CC: Patient is a 55 y.o. female who is seen in consultation from Milli Narvaez  for evaluation of prolapse and incontinence. HPI:  Patient had a hysterectomy for fibroids done several years ago. She currently works as a nurse practitioner for [employer] and recently moved to [place] from [place]. She describes pelvic organ prolapse as well as incomplete emptying of her bowels. She also complains of urinary urgency and frequency. Bladder control problem: Yes   How many months have you had a bladder problem? 2 years  Do you use pads to absorb lost urine? Yes. If yes how many pads do you wear a day? 1   How many trips do you make to the bathroom during the day? How many times do you wake at night to go to the bathroom? 0  Do you ever wet the bed while asleep? No  Are there times when you cannot make it to the bathroom on time? No  Does sound, sight, or feel of running water cause you to lose urine? No  Which best describes urine loss: (Check all that apply)   [x] I lose urine during coughing, sneezing, running, lifting   [] I lose urine with changes in posture, standing, walking   [] I lose urine continuously such that I am constantly wet   [] I have sudden, urgent needs without the ability to make it to the bathroom  Have you seen a physician for complaints of urine loss? No \  Have you taken medication to prevent urine loss? No   How many glasses of liquid do you consume daily? 64 ounces  How many drinks containing caffeine do you consume daily? 2     Bladder emptying problems:  Yes   How long have you had bladder emptying problems? 1 years  Do you notice any dribbling of urine when you stand after passing urine? Yes  Do you usually have difficulty starting your urine stream? Yes  Do you have to assume abnormal positions to urinate? No   Do you have to strain to empty your bladder?  Yes  Is your urine flow: strong  Do you feel as if your bladder is empty after passing urine? Yes, if she sits for a minute    Prolapse/Vaginal Support problems: Yes   Do you notice a bulge? Yes  How long have you had a protrusion or bulge?  unsure  Are your symptoms worse at the end of the day or after for prolonged periods? No  Do you push the protrusion back to help with a bowel movement or to empty your bladder? No  Have you ever used a pessary (plastic support device) for this problem? No        Bowel problem(s): Yes   How long have you had bowel symptoms? 15 years  Do you have accidental loss of solid stool? No  Do you have accidental loss of liquid stool? No  Do you have accidental loss of gas? No  How long have you had accidental loss of stool or gas? How many episodes per week? 0  Do you wear protective pads for this problem? No  Do you have constipation? Yes Do you have diarrhea? No Problems with bloating? No  Do you have frequent desire to have a bowel movement? No  Do you feel that your bowels are never completely empty? Yes  Do you ever place your fingers in your vagina or between the vagina and rectum to help with a bowel movement? No  Have you seen a physician for bowel symptoms? Yes If yes, who? GI, and PCP    Sexual History:  reports being sexually active and has had partner(s) who are Male.  She reports using the following method of birth control/protection: Surgical.  Pelvic Pain:  No   Ob/Gyn History:    OB History    Para Term  AB Living   2 2 2         SAB TAB Ectopic Molar Multiple Live Births                    # Outcome Date GA Lbr Ruy/2nd Weight Sex Delivery Anes PTL Lv   2 Term            1 Term              Past Medical History:   Past Medical History:   Diagnosis Date    Back pain     Depression     Fibromyalgia     Hypertension     Hypertension, essential     Hypothyroidism     Hypothyroidism     IBS (irritable bowel syndrome)     with constipation    Insomnia     Mild reactive airways disease     Muscle cramps     Obesity (BMI 30-39. 9)     Tachycardia      Past Surgical History:   Past Surgical History:   Procedure Laterality Date    DILATION AND CURETTAGE OF UTERUS      HYSTERECTOMY, TOTAL ABDOMINAL      SINUS SURGERY       Allergies: No Known Allergies  Current Medications:  Current Outpatient Medications   Medication Sig Dispense Refill    oxybutynin (DITROPAN-XL) 10 MG extended release tablet Take 1 tablet by mouth daily 30 tablet 1    metoprolol succinate (TOPROL XL) 25 MG extended release tablet TAKE ONE TABLET BY MOUTH DAILY 90 tablet 1    atorvastatin (LIPITOR) 10 MG tablet TAKE ONE TABLET BY MOUTH DAILY 90 tablet 1    levothyroxine (SYNTHROID) 88 MCG tablet TAKE ONE TABLET BY MOUTH DAILY 90 tablet 1    buPROPion (WELLBUTRIN XL) 150 MG extended release tablet Take 2 tablets by mouth every morning 180 tablet 3    cyclobenzaprine (FLEXERIL) 10 MG tablet Take 1 tablet by mouth daily 90 tablet 2    QUEtiapine (SEROQUEL) 25 MG tablet TAKE ONE TABLET BY MOUTH ONCE NIGHTLY 90 tablet 2    metFORMIN (GLUCOPHAGE-XR) 500 MG extended release tablet Take 1 tablet by mouth daily (with breakfast) (Patient not taking: Reported on 10/5/2021) 90 tablet 1    albuterol sulfate HFA (VENTOLIN HFA) 108 (90 Base) MCG/ACT inhaler Inhale 2 puffs into the lungs 4 times daily as needed for Wheezing (Patient not taking: Reported on 10/5/2021) 3 Inhaler 1    montelukast (SINGULAIR) 10 MG tablet Take 1 tablet by mouth daily (Patient not taking: Reported on 10/5/2021) 30 tablet 3    Plecanatide 3 MG TABS Take 3 mg by mouth daily (Patient not taking: Reported on 10/5/2021) 30 tablet 3    ondansetron (ZOFRAN) 8 MG tablet Take 1 tablet by mouth every 8 hours as needed for Nausea or Vomiting (Patient not taking: Reported on 10/5/2021) 30 tablet 0    fluticasone (FLONASE) 50 MCG/ACT nasal spray 2 sprays by Nasal route daily 1 Bottle 2     No current facility-administered medications for this visit. Maternal Grandfather     Hypertension Maternal Grandfather     Breast Cancer Maternal Cousin     Breast Cancer Paternal Cousin      Review of System  Review of Systems   Gastrointestinal: Positive for constipation and rectal pain. Genitourinary: Positive for frequency. All other systems reviewed and are negative. A review of systems was done by the patient and reviewed by me and scanned into media today. Objective:     Vital Signs  Vitals:    10/05/21 0850   BP: (!) 143/100   Pulse: 78   Resp: 14   Temp: 98.6 °F (37 °C)   SpO2: 97%     Physical Exam  Physical Exam  HENT:      Head: Normocephalic and atraumatic. Eyes:      Conjunctiva/sclera: Conjunctivae normal.   Pulmonary:      Effort: Pulmonary effort is normal.   Abdominal:      Palpations: Abdomen is soft. Genitourinary:     Comments: Predominant rectocele, cystocele  Musculoskeletal:      Cervical back: Normal range of motion and neck supple. Skin:     General: Skin is warm and dry. Neurological:      Mental Status: She is alert and oriented to person, place, and time. Office Fill Study/Urine Dip:     Using sterile technique a manometry catheter was placed. Patient's bladder was filled with sterile water by gravity. Capacity and storage volumes were measured. Spasms assessed. Catheter was removed. Stress urinary incontinence was assessed.     Results for POC orders placed in visit on 10/05/21   POCT Urinalysis no Micro   Result Value Ref Range    Color, UA yellow     Clarity, UA clear     Glucose, UA POC neg     Bilirubin, UA neg     Ketones, UA neg     Spec Grav, UA 1.015     Blood, UA POC small     pH, UA 6.5     Protein, UA POC neg     Urobilinogen, UA neg     Leukocytes, UA large     Nitrite, UA negative        Cystometrogram   wbc urine, poc   Date Value Ref Range Status   10/05/2021 large  Final     rbc urine, poc   Date Value Ref Range Status   10/05/2021 small  Final     Nitrate, UA POC   Date Value Ref Range Status 10/05/2021 neg  Final     post void residual   Date Value Ref Range Status   10/05/2021 25 ml Final     FIRST SENSATION   Date Value Ref Range Status   10/05/2021 40 cc Final     SECOND SENSATION   Date Value Ref Range Status   10/05/2021 180 cc Final     MAX SENSATION   Date Value Ref Range Status   10/05/2021 250 cc Final     EMPTY COUGH STRESS TEST   Date Value Ref Range Status   10/05/2021 negative  Final     FULL COUGH STRESS TEST   Date Value Ref Range Status   10/05/2021 negative  Final     SPASM   Date Value Ref Range Status   10/05/2021 negative  Final        POPQ and Pelvic Exam:     Pelvic Organ Prolapse Quantification  Anterior Wall (Aa): -2   Anterior Wall (Ba): -2   Cervix or Cuff (C): -4     Genital Hiatus (gh): 2   Perineal Body (pb): 3   Total Vaginal Length (tvl): 8     Posterior Wall (Ap): 0   Posterior Wall (Bp): 0   No data recorded   Oxford Scale Muscle Strength: 4/5       Assessment/Plan:     Joss Calhoun is a 55 y.o. female with   1. Urinary urgency    2. Urinary frequency    3. Rectocele    4. Constipation due to outlet dysfunction    5. Incomplete defecation    Old records were reviewed, outside records were reviewed, cystometrogram was reviewed  She has agreed to try and increase her fiber content overall. She is also going to follow-up with pelvic floor physical therapy for both the constipation and the overactive bladder. She is also going to try an anticholinergic medications for her overactive bladder. Risk and benefits of this were discussed at length. Orders Placed This Encounter   Procedures    Culture, Urine     Order Specific Question:   Specify (ex-cath, midstream, cysto, etc)?      Answer:   straight cath    Ambulatory referral to Physical Therapy     Referral Priority:   Routine     Referral Type:   Physical Therapy     Referral Reason:   Specialty Services Required     Referred to Provider:   Danny Mancini PT     Number of Visits Requested:   1    POCT Urinalysis no Micro    Cystometrogram     Orders Placed This Encounter   Medications    oxybutynin (DITROPAN-XL) 10 MG extended release tablet     Sig: Take 1 tablet by mouth daily     Dispense:  30 tablet     Refill:  1       Tiffanie Noel MD

## 2021-10-08 ENCOUNTER — TELEPHONE (OUTPATIENT)
Dept: UROGYNECOLOGY | Age: 56
End: 2021-10-08

## 2021-10-08 DIAGNOSIS — N39.0 ACUTE UTI: Primary | ICD-10-CM

## 2021-10-08 LAB
ORGANISM: ABNORMAL
URINE CULTURE, ROUTINE: ABNORMAL

## 2021-10-08 RX ORDER — NITROFURANTOIN 25; 75 MG/1; MG/1
100 CAPSULE ORAL 2 TIMES DAILY
Qty: 10 CAPSULE | Refills: 0 | Status: SHIPPED | OUTPATIENT
Start: 2021-10-08 | End: 2021-10-13

## 2021-10-08 RX ORDER — NITROFURANTOIN 25; 75 MG/1; MG/1
100 CAPSULE ORAL 2 TIMES DAILY
Qty: 14 CAPSULE | Refills: 0 | Status: SHIPPED
Start: 2021-10-08 | End: 2021-10-08 | Stop reason: CLARIF

## 2021-10-08 NOTE — TELEPHONE ENCOUNTER
Patient advised of positive Urine culture and need for treatment. She reports symptoms beginning last night. She requests RX sent to [facility] near her.  Macrobid sent to her pharmacy

## 2021-10-15 ENCOUNTER — OFFICE VISIT (OUTPATIENT)
Dept: UROGYNECOLOGY | Age: 56
End: 2021-10-15
Payer: COMMERCIAL

## 2021-10-15 DIAGNOSIS — R39.15 URGENCY OF URINATION: Primary | ICD-10-CM

## 2021-10-15 DIAGNOSIS — R35.0 URINARY FREQUENCY: ICD-10-CM

## 2021-10-15 PROCEDURE — 99211 OFF/OP EST MAY X REQ PHY/QHP: CPT | Performed by: OBSTETRICS & GYNECOLOGY

## 2021-10-17 LAB — URINE CULTURE, ROUTINE: NORMAL

## 2021-10-20 DIAGNOSIS — J01.90 ACUTE BACTERIAL SINUSITIS: Primary | ICD-10-CM

## 2021-10-20 DIAGNOSIS — B96.89 ACUTE BACTERIAL SINUSITIS: Primary | ICD-10-CM

## 2021-10-20 RX ORDER — AMOXICILLIN AND CLAVULANATE POTASSIUM 875; 125 MG/1; MG/1
1 TABLET, FILM COATED ORAL 2 TIMES DAILY
Qty: 20 TABLET | Refills: 0 | Status: SHIPPED | OUTPATIENT
Start: 2021-10-20 | End: 2021-10-30

## 2021-10-20 RX ORDER — PREDNISONE 20 MG/1
20 TABLET ORAL 2 TIMES DAILY
Qty: 10 TABLET | Refills: 0 | Status: SHIPPED | OUTPATIENT
Start: 2021-10-20 | End: 2021-10-25

## 2021-10-20 NOTE — PROGRESS NOTES
Evisit- Scheduling error  5 days sinus congestion, cough  Green thick drainage, facial pain, misery  Tried OTC and sinus flushing with only temp relief  Will provide Augmentin and prednisone.  F/U if no relief  Continue OTC

## 2021-10-21 NOTE — PROGRESS NOTES
[address] [facility] [place] [address]  Phone: [phone]  Fax: [phone]                                                      Physical Therapy Certification    Dear Referring Practitioner: Edil Holman MD,    We had the pleasure of evaluating the following patient for physical therapy services at [place] [address]. A summary of our findings can be found in the initial assessment below. This includes our plan of care. If you have any questions or concerns regarding these findings, please do not hesitate to contact me at the office phone number checked above. Thank you for the referral.       Physician Signature:_______________________________Date:__________________  By signing above (or electronic signature), therapist's plan is approved by physician      Patient: Hortensia Buitrago   : 1965   MRN: [id number]  Referring Physician: Referring Practitioner: Edil Holman MD      Evaluation Date: 2021      Medical Diagnosis Information:  Diagnosis: R39.15 - Urinary urgency                                             Insurance information: PT Insurance Information: [employer]    Second person requested for examination:  [x] No    [] Yes   If yes, who was present:    Precautions/ Contra-indications: NA    Latex Allergy:  [x]NO      []YES    Preferred Language for Healthcare:   [x]English       []Other:    C-SSRS Triggered by Intake questionnaire (Past 2 wk assessment ):   [x] No, Questionnaire did not trigger screening.   [] Yes, Patient intake triggered C-SSRS Screening     [] Completed, no further action required.    [] Completed, PCP notified via Epic    Functional Outcome:   PFDI-20: 118.75  Sub-sections: POPDI-6 Score 41.67; CRADI-8 Score: 31.25; KIT-6 Score: 43.80    Female Sexual Function Index (FSFI)  - NT - reports infrequent pain with deep vaginal penetration primarily when constipated    SUBJECTIVE: Patient stated complaint:urinary urgency and frequency, pelvic organ prolapse and incomplete emptying of her bowels/constipation. History of low back and neck pain which she attributes in part to myofascial pain syndrome. Patient is hopeful to learn to manage her rectocele and avoid any surgical intervention if possible. She reports long history of interstitial cystitis and tends to minimize her urinary symptoms. Pregnancies: [] No    [x] Yes, if yes #  2  Deliveries: # and type:  x 2     4 week or greater of failed trial of PFPT program?   [x] No    [] Yes     PFPT program as defined by \"Completing 4 weeks of an ordered plan of pelvic muscle exercises designed to increase periurethral muscle strength\". Relevant Medical History:  Per Dr. Cecilia Martinez on 10/5/2021 -   CC: Patient is a 55 y.o. female who is seen in consultation from Raymond Sawyer  for evaluation of prolapse and incontinence.      HPI:  Patient had a hysterectomy for fibroids done several years ago. 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 She describes pelvic organ prolapse as well as incomplete emptying of her bowels. She also complains of urinary urgency and frequency. Bladder control problem: Yes   How many months have you had a bladder problem? 2 years  Do you use pads to absorb lost urine? Yes. If yes how many pads do you wear a day? 1   How many trips do you make to the bathroom during the day? How many times do you wake at night to go to the bathroom? 0  Do you ever wet the bed while asleep? No  Are there times when you cannot make it to the bathroom on time? No  Does sound, sight, or feel of running water cause you to lose urine? No  Which best describes urine loss: (Check all that apply)  · [x]? I lose urine during coughing, sneezing, running, lifting  · []? I lose urine with changes in posture, standing, walking  · []?  I lose urine continuously such that I am constantly wet  · []? I have sudden, urgent needs without the ability to make it to the bathroom  Have you seen a physician for complaints of urine loss? No \  Have you taken medication to prevent urine loss? No   How many glasses of liquid do you consume daily? 64 ounces  How many drinks containing caffeine do you consume daily? 2            Bladder emptying problems:  Yes   How long have you had bladder emptying problems? 1 years  Do you notice any dribbling of urine when you stand after passing urine? Yes  Do you usually have difficulty starting your urine stream? Yes  Do you have to assume abnormal positions to urinate? No   Do you have to strain to empty your bladder? Yes  Is your urine flow: strong  Do you feel as if your bladder is empty after passing urine? Yes, if she sits for a minute     Prolapse/Vaginal Support problems: Yes   Do you notice a bulge? Yes  How long have you had a protrusion or bulge?  unsure  Are your symptoms worse at the end of the day or after for prolonged periods? No  Do you push the protrusion back to help with a bowel movement or to empty your bladder? No  Have you ever used a pessary (plastic support device) for this problem? No           Bowel problem(s): Yes   How long have you had bowel symptoms? 15 years  Do you have accidental loss of solid stool? No  Do you have accidental loss of liquid stool? No  Do you have accidental loss of gas? No  How long have you had accidental loss of stool or gas? How many episodes per week? 0  Do you wear protective pads for this problem? No  Do you have constipation? Yes Do you have diarrhea? No Problems with bloating? No  Do you have frequent desire to have a bowel movement? No  Do you feel that your bowels are never completely empty? Yes  Do you ever place your fingers in your vagina or between the vagina and rectum to help with a bowel movement? No  Have you seen a physician for bowel symptoms?  Yes If yes, who? GI, and PCP     Sexual History:  reports being sexually active and has had partner(s) who are Male. She reports using the following method of birth control/protection: Surgical.  Pelvic Pain:  No   Ob/Gyn History:                      OB History    Para Term  AB Living   2 2 2         SAB TAB Ectopic Molar Multiple Live Births                      # Outcome Date GA Lbr Ruy/2nd Weight Sex Delivery Anes PTL Lv   2 Term                     1 Term                        Past Medical History:   Past Medical History        Past Medical History:   Diagnosis Date    Back pain      Depression      Fibromyalgia      Hypertension      Hypertension, essential      Hypothyroidism      Hypothyroidism      IBS (irritable bowel syndrome)       with constipation    Insomnia      Mild reactive airways disease      Muscle cramps      Obesity (BMI 30-39. 9)      Tachycardia           Past Surgical History:   Past Surgical History         Past Surgical History:   Procedure Laterality Date    DILATION AND CURETTAGE OF UTERUS        HYSTERECTOMY, TOTAL ABDOMINAL        SINUS SURGERY             Physical Exam  Genitourinary:     Comments: Predominant rectocele, cystocele    POPQ and Pelvic Exam:      Pelvic Organ Prolapse Quantification  Anterior Wall (Aa): -2    Anterior Wall (Ba): -2    Cervix or Cuff (C): -4      Genital Hiatus (gh): 2    Perineal Body (pb): 3    Total Vaginal Length (tvl): 8      Posterior Wall (Ap): 0    Posterior Wall (Bp): 0    No data recorded   Oxford Scale Muscle Strength: 4/5        Assessment/Plan:      Joss Calhoun is a 55 y.o. female with   1. Urinary urgency    2. Urinary frequency    3. Rectocele    4. Constipation due to outlet dysfunction    5. Incomplete defecation    Old records were reviewed, outside records were reviewed, cystometrogram was reviewed  She has agreed to try and increase her fiber content overall.   She is also going to follow-up with pelvic floor physical therapy for both the constipation and the overactive bladder. She is also going to try an anticholinergic medications for her overactive bladder. Risk and benefits of this were discussed at length.         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 time    Living Status/Prior Level of Function: Prior to this injury / incident, pt was independent with ADLs and IADLs, works full time as NP    OBJECTIVE:  Ortho Screen:  Posture -rounded and elevated shoulders  Other Observation  Palpation - moderate to severe tension/tightness in bilateral upper traps  Alignment    ROM LEFT RIGHT Comments   Cervical Side Bend   Mild limitations   Cervical Rotation   Mild limitations   Shoulder Flex   WFL   Shoulder Abd      Shoulder ER      Shoulder IR            Lumbar Side Bend   Mild limitations   Lumbar Rotation   Mild limitations   Hip Flexion   WFL   Hip Abd      Hip ER      Hip IR      Hip Extension      Knee Ext   WFL   Knee Flex   WFL         Hamstring Flex   WFL   Piriformis   Mild limitations                   Strength LEFT RIGHT Comments   Shoulder flexion   4+-5/5   Shoulder scaption      Shoulder ER      Shoulder IR      Biceps   4+-5/5   Triceps   4+-5/5               Multifidus      Transverse Ab   See below   Hip Flexors   4+-5/5   Hip Abductors   4+-5/5   Hip Extensors      Hip Internal Rotators   4+-5/5   Hip External Rotators   4+-5/5     TA Muscle Contraction Scale    Criteria                                               Score  Quality of Contraction   Not Present      [] 0   Rapid, Superificial     [] 1   Just Perceptible     [x] 2     Gentle, seconds  Repetitions - 1 reps - did not attempt max reps due to increased tightness and tenderness in L PF and significant difficulty with releasing even quick flick contractions  Quick Flicks - 3 reps with extremely slow ability to relax afterward, improved with cues to attempt slightly less deep PF contraction                         [x] Patient history, allergies, meds reviewed. Medical chart reviewed. See intake form. Review Of Systems (ROS):  [x]Performed Review of systems (Integumentary, CardioPulmonary, Neurological) by intake and observation. Intake form has been scanned into medical record. Patient has been instructed to contact their primary care physician regarding ROS issues if not already being addressed at this time.       Co-morbidities/Complexities (which will affect course of rehabilitation):   []None        []Hx of COVID   Arthritic conditions   []Rheumatoid arthritis (M05.9)  []Osteoarthritis (M19.91)  []Gout   Cardiovascular conditions   [x]Hypertension (I10)  []Hyperlipidemia (E78.5)  []Angina pectoris (I20)  []Atherosclerosis (I70)  []Pacemaker  []Hx of CABG/stent/  cardiac surgeries   Musculoskeletal conditions   []Disc pathology   []Congenital spine pathologies   []Osteoporosis (M81.8)  []Osteopenia (M85.8)  []Scoliosis  X low back pain  X TMJ/neck pain     Endocrine conditions   [x]Hypothyroid (E03.9)  []Hyperthyroid Gastrointestinal conditions   [x]Constipation (K59.00)  X IBS   Metabolic conditions   []Morbid obesity (E66.01)  []Diabetes type 1(E10.65) or 2 (E11.65)   []Neuropathy (G60.9)     Cardio/Pulmonary conditions   []Asthma (J45)  []Coughing   []COPD (J44.9)  []CHF  []A-fib   Psychological Disorders  []Anxiety (F41.9)  [x]Depression (F32.9)   []Other:   Developmental Disorders  []Autism (F84.0)  []CP (G80)  []Down Syndrome (Q90.9)  []Developmental delay     Neurological conditions  []Prior Stroke (I69.30)  []Parkinson's (G20)  []Encephalopathy (G93.40)  []MS (G35)  []Post-polio (G14)  []SCI  []TBI  []ALS Other conditions  [x]Fibromyalgia (M79.7)  []Vertigo  []Syncope  []Kidney Failure  []Cancer      []currently undergoing                treatment   []Pregnancy  [x]Incontinence   Prior surgeries  []involved limb  []previous spinal surgery  [x] section birth  [x]hysterectomy  []bowel / bladder surgery  []other relevant surgeries   []Other:   Insomnia, mild reactive airway disease, muscle cramps, obesity           Barriers to/and or personal factors that will affect rehab potential:              [x]Age  [x]Sex   []Smoker              []Motivation/Lack of Motivation                        [x]Co-Morbidities              []Cognitive Function, education/learning barriers              []Environmental, home barriers              [x]profession/work barriers  []past PT/medical experience   []other:  Justification: Patient demonstrates interest in and motivation for maximizing potential for improved PF muscle and bladder control. Falls Risk Assessment (30 days):   [x] Falls Risk assessed and no intervention required. [] Falls Risk assessed and Patient requires intervention due to being higher risk   TUG score (>12s at risk):     [] Falls education provided, including         ASSESSMENT:      Patient presents to PF PT with history of IBS with constipation and outlet dysfunction and has recently been diagnosed with a rectocele. In addition, she has overactive bladder causing urinary urgency and frequency. Patient stated complaint:urinary urgency and frequency, pelvic organ prolapse and incomplete emptying of her bowels/constipation. History of low back and neck pain which she attributes in part to myofascial pain syndrome. Patient is hopeful to learn to manage her rectocele and avoid any surgical intervention if possible. She reports long history of interstitial cystitis and tends to minimize her urinary symptoms. Rates severity of problem 7/10.    Patient's goals: learn to have proper bowel movements without straining and prevent progression of rectocele    After reviewing bowel/bladder behavioral modification information, PT used a pelvic floor model to orient the patient with her pelvic organ and pelvic floor muscles anatomy. Patient verbally consented to transvaginal pelvic muscle floor muscle assessment which revealed fair control/power of her pelvic floor muscles with inconsistent mind-body connection and limited coordination, especially for relaxation phase of PF contraction. Patient was able to demonstrate up to 3+/5 muscle contraction for 8 seconds (10 seconds is normal), but did not attempt to maximize reps due to tightness and tenderness noted in L PF muscles and increased difficulty to relax/release tension with quick flicks. Noted moderate core instability with limited coordination of abdominals with diaphragm and PF muslces. With transvaginal palpation to PF muscles, noted moderate to severe tightness and tenderness especially in all layers of L PF muscles, with laxity noted in all layers of R PF muscles. Instructed patient in diaphragmatic breathing for PF relaxation and overall down training and quick flicks for urinary urge suppression, with increased awareness of need to relax/return to baseline PF muscle activity with each rep. Patient would definitely benefit from pelvic floor physical therapy for continued education on healthy bladder/bowel habits and adjustment of behavioral modifications as well as advanced activities to improve muscle control/ coordination and endurance of pelvic floor, core, and hip muscles to decrease urinary frequency and urgency, and decrease constipation with less need to strain during BMs to improve or at least prevent cystocele from worsening.     Functional Impairments:    [x]Noted lumbar/proximal hip hypomobility  [x]Noted lumbosacral and/or generalized hypermobility  [x]Decreased core/proximal hip strength and neuromuscular control factors and/or comorbidities that impact the plan of care  [x]3 personal factors and/or comorbidities that impact the plan of care  [x] An examination of body systems using standardized tests and measures addressing any of the following: body structures and functions (impairments), activity limitations, and/or participation restrictions;:  [] a total of 1-2 or more elements   [] a total of 3 or more elements   [x] a total of 4 or more elements   [x] A clinical presentation with:  [] stable and/or uncomplicated characteristics   [] evolving clinical presentation with changing characteristics  [] unstable and unpredictable characteristics;   [x] Clinical decision making of [] low, [x] moderate, [] high complexity using standardized patient assessment instrument and/or measurable assessment of functional outcome. [] EVAL (LOW) 83058 (typically 20 minutes face-to-face)  [x] EVAL (MOD) 44275 (typically 30 minutes face-to-face)  [] EVAL (HIGH) 63729 (typically 45 minutes face-to-face)  [] RE-EVAL       PLAN:   Frequency/Duration:     Recommend see patient every 1-2 weeks initially to perform tranvaginal PF muscle assessment and intervention as deemed necessary then to ensure patient is performing exercises for pelvic floor, hip and core stability correctly. Taper as appropriate, determining frequency of treatments based on progress, for a total of ~8-10 sessions.  Next scheduled appointment is on 11/17 at 4PM.    Interventions:  [x]  Therapeutic exercise including: strength training, ROM, and functional mobility for joint, spine, core, and pelvic floor   [x]  NMR activation and proprioception for abdominals, pelvic floor musculature activation and coordination, and posture retraining   [x]  Manual therapy as indicated for spine, ribs, soft tissue, and pelvic floor to include: IASTM with or without dilator, STM, PROM, Gr I-IV mobilizations   [x] Modalities as needed that may include: E-stim, Biofeedback as indicated  [x] Patient education on pelvic floor anatomy and function, bladder and bowel anatomy and function, joint protection, postural re-education, activity modification, progression of HEP. Treatment Interventions Implemented    HEP instruction: Written HEP instructions provided and reviewed    Diaphragmatic breathing - coordinating with pelvic floor muscle activities - tactile cues on stomach - cues for PF descent with inhalation, cues to avoid PF holding contractions   PF muscle control exercises -  supine - transvaginal feedback - QUICK FLICKS - 10 reps with cues to use for urge suppression and may practice for exercise ~2 times/day    Manual Interventions -   Patient verbally consented to PF muscle assessment and intervention. Superficial external myofascial release and massage to transverse perineal and external anal sphincter muscles with mild to moderate tenderness and tightness/tension in transverse perineal L>R. Transvaginal myofascial release and stretching of superficial, middle, and deep layers of PF muscles with focus on areas of increased tension and tissue resistance-  moderate to severe tenderness and tightness noted in L PF,  WFL to laxity noted on , so focused myofascial release transvaginally to L PF middle to deep layers and externally to L buttocks/thigh simultaneously. Patient Education -   Extensive education on anatomy of pelvis including PF muscles and pelvic organs. Emphasized need for stretching and down training of tight muscles and strengthening of weak muscles to promote improve muscle balance in pelvis/low back and legs. Used PF model to demonstrate transvaginal PF muscle assessment to which patient verbally consented. Patient appeared to have better understanding of current bladder/bowel issues and improved outlook on situation by end of PF PT therapy session.   Issued and reviewed handouts on normal bladder , contributing factors to PF dysfunction, health/bladder irritants, diaphragmatic breathing, quick flicks for urge suppression, constipation and pelvic organ prolapse. GOALS:  Patient stated goal: \" learn to have proper bowel movements without straining and prevent progression of rectocele\"  [] Progressing: [] Met: [] Not Met: [] Adjusted      Therapist goals for Patient:     Short Term Goals: To be achieved in: 4-5 sessions    1. Patient will have a decrease in pain with defication and less urgency and frequency with urination to indicate improvement in pelvic floor strength and relaxation, muscle coordination, and/or bladder retraining. [] Progressing: [] Met: [] Not Met: [] Adjusted  2. Perform HEP for pelvic floor and core muscle groups with minimal direction from therapist as she progresses to become more independent managing her pelvic pressure and PF muscle weakness/tightness. [] Progressing: [] Met: [] Not Met: [] Adjusted  3. Report using 1-2 behavioral modification strategies to reduce urinary/bowel complaints through dietary and mechanical changes, with focus on full emptying of bladder with each urination and using optimal positioning and deep breathing for relaxation of posterior PF muscles during defacation, reducing need to strain. [] Progressing: [] Met: [] Not Met: [] Adjusted    Long Term Goals: To be achieved in: 8-10 sessions    1. Improve score of quality of life index measure, PFDI-20, to 90 or less (initial eval - 118.75) disability index to assist with reaching prior level of function and demonstrating improved quality of life with less life interruptions due to pelvic pressure due to rectocele as well as urinary urgency and frequency, allowing patient to fully participate in social activities, including spending time with her children. [] Progressing: [] Met: [] Not Met: [] Adjusted  2. Patient will return to functional activities including household chores and light walking for exercise without increased symptoms or restriction.    [] Progressing: [] Met: [] Not Met: [] Adjusted  3. Patient will report decreased constipation with increased ease to pass stool with improved consistency and less pain. [] Progressing: [] Met: [] Not Met: [] Adjusted   4. Report using 3-4 behavioral modification strategies to reduce urinary/bowel complaints through dietary and mechanical changes, with focus on full emptying of bladder with each urination and using optimal positioning and deep breathing for relaxation of posterior PF muscles during defacation, reducing need to strain. [] Progressing: [] Met: [] Not Met: [] Adjusted   5. Rate severity of the problem related to urinary frequency/urgency and/or pelvic pain/discomfort with passing stool to 3-4/10 or less on scale of 0-10 with 0 being no problem and 10 being the worst - (7/10 reported at intial [facility]). [] Progressing: [] Met: [] Not Met: [] Adjusted   6. Perform HEP for pelvic floor and core muscle groups independently as she progresses to self-manage her pelvic pressure and PF muscle weakness/tightness.   [] Progressing: [] Met: [] Not Met: [] Adjusted     Electronically signed by:  Shirley Kramer, PT #[id number]    Time in: 5163   Time Out:1720  Total Treatment Time: 100 minutes  Coded Treatment Time: 90 minutes

## 2021-11-02 ENCOUNTER — HOSPITAL ENCOUNTER (OUTPATIENT)
Dept: PHYSICAL THERAPY | Age: 56
Setting detail: THERAPIES SERIES
Discharge: HOME | End: 2021-11-02
Payer: COMMERCIAL

## 2021-11-02 PROCEDURE — 97140 MANUAL THERAPY 1/> REGIONS: CPT | Performed by: PHYSICAL THERAPIST

## 2021-11-02 PROCEDURE — 97162 PT EVAL MOD COMPLEX 30 MIN: CPT | Performed by: PHYSICAL THERAPIST

## 2021-11-02 PROCEDURE — 97110 THERAPEUTIC EXERCISES: CPT | Performed by: PHYSICAL THERAPIST

## 2021-11-02 PROCEDURE — 97530 THERAPEUTIC ACTIVITIES: CPT | Performed by: PHYSICAL THERAPIST

## 2021-11-10 ENCOUNTER — TELEPHONE (OUTPATIENT)
Dept: ORTHOPEDIC SURGERY | Age: 56
End: 2021-11-10

## 2021-11-11 DIAGNOSIS — R25.2 MUSCLE CRAMPS: ICD-10-CM

## 2021-11-11 DIAGNOSIS — R73.09 ELEVATED GLUCOSE: ICD-10-CM

## 2021-11-11 DIAGNOSIS — R00.0 TACHYCARDIA: ICD-10-CM

## 2021-11-11 DIAGNOSIS — G47.00 INSOMNIA, UNSPECIFIED TYPE: ICD-10-CM

## 2021-11-11 DIAGNOSIS — E03.9 HYPOTHYROIDISM, UNSPECIFIED TYPE: ICD-10-CM

## 2021-11-11 RX ORDER — LEVOTHYROXINE SODIUM 88 UG/1
TABLET ORAL
Qty: 90 TABLET | Refills: 2 | Status: SHIPPED | OUTPATIENT
Start: 2021-11-11 | End: 2022-08-22 | Stop reason: SDUPTHER

## 2021-11-11 RX ORDER — ATORVASTATIN CALCIUM 10 MG/1
TABLET, FILM COATED ORAL
Qty: 90 TABLET | Refills: 2 | Status: SHIPPED | OUTPATIENT
Start: 2021-11-11 | End: 2022-08-22 | Stop reason: SDUPTHER

## 2021-11-11 RX ORDER — QUETIAPINE FUMARATE 25 MG/1
TABLET, FILM COATED ORAL
Qty: 90 TABLET | Refills: 2 | Status: SHIPPED | OUTPATIENT
Start: 2021-11-11 | End: 2022-08-09

## 2021-11-11 RX ORDER — METFORMIN HYDROCHLORIDE 500 MG/1
500 TABLET, EXTENDED RELEASE ORAL
Qty: 90 TABLET | Refills: 2 | Status: SHIPPED | OUTPATIENT
Start: 2021-11-11 | End: 2022-08-22 | Stop reason: SDUPTHER

## 2021-11-11 RX ORDER — METOPROLOL SUCCINATE 25 MG/1
TABLET, EXTENDED RELEASE ORAL
Qty: 90 TABLET | Refills: 2 | Status: SHIPPED | OUTPATIENT
Start: 2021-11-11 | End: 2022-08-22 | Stop reason: SDUPTHER

## 2021-11-11 RX ORDER — CYCLOBENZAPRINE HCL 10 MG
10 TABLET ORAL DAILY
Qty: 90 TABLET | Refills: 2 | Status: SHIPPED | OUTPATIENT
Start: 2021-11-11 | End: 2022-08-22 | Stop reason: SDUPTHER

## 2021-11-16 NOTE — PROGRESS NOTES
Ronaldo Shepherd, [address]  Phone: [phone]  Fax: [phone]        Physical Therapy Daily Treatment Note    Date: 2021    Patient Name: Alberto Antonio : 1965 MRN: [id number]    Restrictions/Precautions:    Medical/Treatment Diagnosis Information:    · Diagnosis: R39.15 - Urinary urgency    Insurance/Certification information: PT Insurance Information: Medical [employer] PPO    Physician Information: Referring Practitioner: Beryl Lucero MD    Plan of care signed (Y/N): Moira Cruz by: Florentino Payne MD at 11/3/2021  8:56 AM         Visit# / total visits:  per insurance      Time in:   Time Out:   Total Treatment Time: 70 minutes    ________________________________________________________________________________________    Pain Level: 6/10 with initiating urination stream earlier today    SUBJECTIVE:  Patient reports bowel movements have improved. Reports increased awareness of her PF tension/tightness. Drinking less water when travelled over the weekend, so bladder soreness over last couple of days. Able to use quick flicks effectively for urinary suppression while on the airplane while travelling. Reports riding on jet ski over the weekend as well, which may be contributing to her reported increased soreness and bladder irritation.      OBJECTIVE:    Treatment Interventions Implemented     HEP instruction: Written HEP instructions provided and reviewed     Diaphragmatic breathing - coordinating with pelvic floor muscle activities - tactile cues on stomach - cues for PF descent with inhalation, cues to avoid PF holding contractions     PF muscle control exercises -  supine - transvaginal feedback - QUICK FLICKS - 10 reps with cues to use for urge suppression and may practice for exercise ~2 times/day    Yoga in mostly inverted positions with legs up on wall for PF relaxation and LE stretches with diaphragmatic breathing - 8 poses holding each for 1 minute= ~8-10+ slow, deep breaths    Piriformis stretch - figure four - sitting - hold 8-10 breaths/~1 minute x 2 reps each LE - cues for proper technique to keep arch in back, look ahead, and lean chest forward        Manual Interventions -   Patient verbally consented to PF muscle assessment and intervention. Cross friction massage to lower abdominal/suprapubic scar from previous c-sections with moderate adhesions noted. Superficial external myofascial release and massage to transverse perineal and external anal sphincter muscles with mild to moderate tenderness and tightness/tension in transverse perineal L>R. Transvaginal myofascial release and stretching of superficial, middle, and deep layers of PF muscles with focus on areas of increased tension and tissue resistance-  moderate to severe tenderness and tightness noted in middle to deep L PF, so focused myofascial release transvaginally to L PF middle to deep layers and externally to L buttocks/thigh simultaneously. In prone, deep piriformis and obturator myofascial release follow by massage to gluts and then superficial myofascial release to buttock, lower back and upper thigh on L.        Patient Education -   Reviewed anatomy of pelvis including PF muscles and pelvic organs. Reviewed need for stretching and down training of tight muscles and strengthening of weak muscles to promote improve muscle balance in pelvis/low back and legs.  Patient appeared to have better understanding of current bladder/bowel issues and improved outlook on situation by end of PF PT therapy session.  Previously issued and reviewed handouts on normal bladder , contributing factors to PF dysfunction, health/bladder irritants, diaphragmatic breathing, quick flicks for urge suppression, constipation and pelvic organ prolapse.     Educated patient about differences in sympathetic and parasympathetic nervous systems. Discussed strategies to down regulate her sympathetic (flight or fright) nervous system by implementing deep breathing and other activities to stimulate the parasympathetic (rest and digest) nervous system. Reviewed benefits of stretches and deep breathing for down regulation of nervous system. Educated in relationship of bladder irritation and tightness/tension in surrounding muscles and tissues. Patient reported more travelling expected this weekend and requested stretch she could do while seated - educated in seated piriformis stretch to supplement other stretches. ASSESSMENT:  Patient reports mild improvements with bowel movements and increased awareness of her PF tension/tightness. Able to use quick flicks effectively for urinary suppression when toilet was not accessible. Patient is recognizing increased benefits of stretches both manually and directly applied to her PF muscles as well as for surrounding muscles attached to her pelvis. Patient with decreased tenderness and tightness in L PF muscles after manual techniuqes. Instructed in inverted yoga poses for LE stretches, PF relaxation with diaphragmatic breathing, and overall down regulation of nervous system. Patient would definitely benefit from pelvic floor physical therapy for continued education on healthy bladder/bowel habits and adjustment of behavioral modifications as well as advanced activities to improve muscle control/ coordination and endurance of pelvic floor, core, and hip muscles to decrease urinary frequency and urgency, and decrease constipation with less need to strain during BMs to improve or at least prevent cystocele from worsening. Treatment/Activity Tolerance:    Patient tolerated treatment well   Reports mild improvements in PF and surrounding muscle tightness/tension after manual techniques followed by yoga for stretches and down regulation.      Other:    GOALS:  Patient stated goal: \" learn to have proper bowel movements without straining and prevent progression of rectocele\"  [x]? Progressing: []? Met: []? Not Met: []? Adjusted        Therapist goals for Patient:      Short Term Goals: To be achieved in: 4-5 sessions     1. Patient will have a decrease in pain with defication and less urgency and frequency with urination to indicate improvement in pelvic floor strength and relaxation, muscle coordination, and/or bladder retraining. [x]? Progressing: []? Met: []? Not Met: []? Adjusted  2. Perform HEP for pelvic floor and core muscle groups with minimal direction from therapist as she progresses to become more independent managing her pelvic pressure and PF muscle weakness/tightness. [x]? Progressing: []? Met: []? Not Met: []? Adjusted  3. Report using 1-2 behavioral modification strategies to reduce urinary/bowel complaints through dietary and mechanical changes, with focus on full emptying of bladder with each urination and using optimal positioning and deep breathing for relaxation of posterior PF muscles during defacation, reducing need to strain. [x]? Progressing: []? Met: []? Not Met: []? Adjusted     Long Term Goals: To be achieved in: 8-10 sessions     1. Improve score of quality of life index measure, PFDI-20, to 90 or less (initial eval - 118.75) disability index to assist with reaching prior level of function and demonstrating improved quality of life with less life interruptions due to pelvic pressure due to rectocele as well as urinary urgency and frequency, allowing patient to fully participate in social activities, including spending time with her children. []? Progressing: []? Met: []? Not Met: []? Adjusted  2. Patient will return to functional activities including household chores and light walking for exercise without increased symptoms or restriction. []? Progressing: []? Met: []? Not Met: []? Adjusted  3.  Patient will report decreased constipation with increased ease to pass stool with improved consistency and less pain. []? Progressing: []? Met: []? Not Met: []? Adjusted            4. Report using 3-4 behavioral modification strategies to reduce urinary/bowel complaints through dietary and mechanical changes, with focus on full emptying of bladder with each urination and using optimal positioning and deep breathing for relaxation of posterior PF muscles during defacation, reducing need to strain. []? Progressing: []? Met: []? Not Met: []? Adjusted            5. Rate severity of the problem related to urinary frequency/urgency and/or pelvic pain/discomfort with passing stool to 3-4/10 or less on scale of 0-10 with 0 being no problem and 10 being the worst - [facility] []? Progressing: []? Met: []? Not Met: []? Adjusted            6. Perform HEP for pelvic floor and core muscle groups independently as she progresses to self-manage her pelvic pressure and PF muscle weakness/tightness. []? Progressing: []? Met: []? Not Met: []? Adjusted                        Plan:   Continue per plan of care        Introduce stretches for LEs and PF including: happy baby, hip flexor, and lateral thigh stretches. Frequency/Duration:     Recommend see patient every ~1-2 weeks initially to perform tranvaginal PF muscle assessment and intervention as deemed necessary then to ensure patient is performing exercises for pelvic floor, hip and core stability correctly. Taper as appropriate, determining frequency of treatments based on progress, for a total of ~8-10 sessions.  Patient's next scheduled appointment is on 12/1 at [facility].    Electronically signed by Charlie Montgomery, PT #[id number] on 11/17/2021 at 5:30 PM

## 2021-11-17 ENCOUNTER — HOSPITAL ENCOUNTER (OUTPATIENT)
Dept: PHYSICAL THERAPY | Age: 56
Setting detail: THERAPIES SERIES
Discharge: HOME | End: 2021-11-17
Payer: COMMERCIAL

## 2021-11-17 PROCEDURE — 97110 THERAPEUTIC EXERCISES: CPT | Performed by: PHYSICAL THERAPIST

## 2021-11-17 PROCEDURE — 97530 THERAPEUTIC ACTIVITIES: CPT | Performed by: PHYSICAL THERAPIST

## 2021-11-17 PROCEDURE — 97140 MANUAL THERAPY 1/> REGIONS: CPT | Performed by: PHYSICAL THERAPIST

## 2021-11-24 NOTE — PROGRESS NOTES
[address] [facility] [place] [address]  Phone: [phone]  Fax: [phone]        Physical Therapy Daily Treatment Note    Date: 2021    Patient Name: Barb Kenny : 1965 MRN: [id number]    Restrictions/Precautions:    Medical/Treatment Diagnosis Information:    · Diagnosis: R39.15 - Urinary urgency    Insurance/Certification information: PT Insurance Information: [employer] PPO    Physician Information: Referring Practitioner: Ravi Walker MD    Plan of care signed (Y/N): Marcellus Camacho by: Farshad Sandoval MD at 11/3/2021  8:56 AM         Visit# / total visits: 3/30 per insurance      Time in: 805  Time Out: 900  Total Treatment Time: 55 minutes    ________________________________________________________________________________________    Pain Level: had pain yesterday with starting and/or stopping urine    SUBJECTIVE:  Did travelling over the holiday last week and was not drinking enough. Feeling no specific pain with urination this morning. Patient reports bowel movements have improved - not so much with travelling. Since cut sugar and white flour out of diet her bowels have been more regular and has lost weight since starting this in September. Continues with increased awareness of her PF tension/tightness. Able to use quick flicks effectively for urinary suppression while on the airplane while travelling. Performing inverted yoga fairly consistently in the evening prior to going to bed.        OBJECTIVE:    Treatment Interventions Implemented     HEP instruction: Written HEP instructions provided and reviewed     Diaphragmatic breathing - coordinating with pelvic floor muscle activities - tactile cues on stomach - cues for PF descent with inhalation, cues to avoid PF holding contractions     PF muscle control exercises -  supine - transvaginal feedback - QUICK FLICKS - 10 reps with cues to use for urge suppression and may practice for exercise ~2 times/day    Yoga in mostly inverted positions with legs up on wall for PF relaxation and LE stretches with diaphragmatic breathing - 8 poses holding each for 1 minute= ~8-10+ slow, deep breaths    Piriformis stretch - figure four - sitting - hold 8-10 breaths/~1 minute x 2 reps each LE - cues for proper technique to keep arch in back, look ahead, and lean chest forward     Happy baby - hold for 1 minute = 10 slow, deep breaths s 2-3 reps OR 2-3 minutes total       Manual Interventions -   Patient verbally consented to PF muscle assessment and intervention. Cross friction massage to lower abdominal/suprapubic scar from previous c-sections with moderate adhesions noted. Generalized visceral mobilization and colon massage - instructed patient in self-colon massage and self- cross friction massage to scar. Hip adductor massage and myofascial release - noted moderate tightness and tenderness. Superficial external myofascial release and massage to transverse perineal and external anal sphincter muscles with mild to moderate tenderness and tightness/tension in transverse perineal L>R. Transvaginal myofascial release and stretching of superficial, middle, and deep layers of PF muscles with focus on areas of increased tension and tissue resistance-  moderate to severe tenderness and tightness noted in middle to deep L PF, so focused myofascial release transvaginally to L PF middle to deep layers and externally to L buttocks/thigh simultaneously. Mild in R PF with less focus to this area. Deferred prone due to time constraints, but previously deep piriformis and obturator myofascial release follow by massage to gluts and then superficial myofascial release to buttock, lower back and upper thigh on L.        Patient Education -   Reviewed anatomy of pelvis including PF muscles and pelvic organs.   Reviewed need for stretching and down training of tight muscles and strengthening of weak muscles to promote improve muscle balance in pelvis/low back and legs. Patient appeared to have better understanding of current bladder/bowel issues and improved outlook on situation by end of PF PT therapy session.  Previously issued and reviewed handouts on normal bladder , contributing factors to PF dysfunction, health/bladder irritants, diaphragmatic breathing, quick flicks for urge suppression, constipation and pelvic organ prolapse.     Reviewed differences in sympathetic and parasympathetic nervous systems. Discussed strategies to down regulate her sympathetic (flight or fright) nervous system by implementing deep breathing and other activities to stimulate the parasympathetic (rest and digest) nervous system. Reviewed benefits of stretches and deep breathing for down regulation of nervous system. Reviewed relationship of bladder irritation and tightness/tension in surrounding muscles and tissues. Benefits of colon massage and cross friction massage to scar tissue especially with restriction that may be effecting her bladder. ASSESSMENT:  Patient reports continued increased awareness of her PF tension/tightness. Able to use quick flicks effectively for urinary suppression when toilet was not accessible. Patient is recognizing increased benefits of stretches both manually and directly applied to her PF muscles as well as for surrounding muscles attached to her pelvis. Patient with decreased tenderness and tightness in L PF muscles after manual techniuqes. Instructed in happy baby pose for PF relaxation and stretching with diaphragmatic breathing, and overall down regulation of nervous system.    Patient responded well to cross friction massage followed by generalized visceral and colon massage - noted moderate adhesions in  scar that may be limiting mobility of underlying tissues surrounding the bladder and to increased sensitivity and bladder as well as bowel symptoms. Patient would definitely benefit from pelvic floor physical therapy for continued manual interventions, education on healthy bladder/bowel habits and adjustment of behavioral modifications as well as advanced activities to improve muscle control/ coordination and endurance of pelvic floor, core, and hip muscles to decrease urinary frequency and urgency, and decrease constipation with less need to strain during BMs to improve or at least prevent cystocele from worsening. Treatment/Activity Tolerance:    Patient tolerated treatment well   Reports mild improvements in PF and surrounding muscle tightness/tension after manual techniques. Other:    GOALS:  Patient stated goal: \" learn to have proper bowel movements without straining and prevent progression of rectocele\"  [x]? Progressing: []? Met: []? Not Met: []? Adjusted        Therapist goals for Patient:      Short Term Goals: To be achieved in: 4-5 sessions     1. Patient will have a decrease in pain with defication and less urgency and frequency with urination to indicate improvement in pelvic floor strength and relaxation, muscle coordination, and/or bladder retraining. [x]? Progressing: []? Met: []? Not Met: []? Adjusted  2. Perform HEP for pelvic floor and core muscle groups with minimal direction from therapist as she progresses to become more independent managing her pelvic pressure and PF muscle weakness/tightness. [x]? Progressing: []? Met: []? Not Met: []? Adjusted  3. Report using 1-2 behavioral modification strategies to reduce urinary/bowel complaints through dietary and mechanical changes, with focus on full emptying of bladder with each urination and using optimal positioning and deep breathing for relaxation of posterior PF muscles during defacation, reducing need to strain. [x]? Progressing: []? Met: []? Not Met: []? Adjusted     Long Term Goals: To be achieved in: 8-10 sessions     1.  Improve score of quality of life index measure, PFDI-20, to 90 or less (initial eval - 118.75) disability index to assist with reaching prior level of function and demonstrating improved quality of life with less life interruptions due to pelvic pressure due to rectocele as well as urinary urgency and frequency, allowing patient to fully participate in social activities, including spending time with her children. []? Progressing: []? Met: []? Not Met: []? Adjusted  2. Patient will return to functional activities including household chores and light walking for exercise without increased symptoms or restriction. []? Progressing: []? Met: []? Not Met: []? Adjusted  3. Patient will report decreased constipation with increased ease to pass stool with improved consistency and less pain. []? Progressing: []? Met: []? Not Met: []? Adjusted            4. Report using 3-4 behavioral modification strategies to reduce urinary/bowel complaints through dietary and mechanical changes, with focus on full emptying of bladder with each urination and using optimal positioning and deep breathing for relaxation of posterior PF muscles during defacation, reducing need to strain. []? Progressing: []? Met: []? Not Met: []? Adjusted            5. Rate severity of the problem related to urinary frequency/urgency and/or pelvic pain/discomfort with passing stool to 3-4/10 or less on scale of 0-10 with 0 being no problem and 10 being the worst - (7/10 reported at intial eval). []? Progressing: []? Met: []? Not Met: []? Adjusted            6. Perform HEP for pelvic floor and core muscle groups independently as she progresses to self-manage her pelvic pressure and PF muscle weakness/tightness. []? Progressing: []? Met: []? Not Met: []? Adjusted                        Plan:   Continue per plan of care       Advance stretches for LEs and PF including: hip flexor  and lateral thigh stretches.        Frequency/Duration:     Recommend see patient every ~1-2 weeks initially to perform tranvaginal PF muscle assessment and intervention as deemed necessary then to ensure patient is performing exercises for pelvic floor, hip and core stability correctly. Taper as appropriate, determining frequency of treatments based on progress, for a total of ~8-10 sessions.  Patient's next scheduled appointment is on 12/14 at [place] [id number].    Electronically signed by Rodri Vidal, PT #[id number] on 12/1/2021 at 8:05 AM

## 2021-11-30 ENCOUNTER — PROCEDURE VISIT (OUTPATIENT)
Dept: UROGYNECOLOGY | Age: 56
End: 2021-11-30
Payer: COMMERCIAL

## 2021-11-30 VITALS
TEMPERATURE: 97.3 F | SYSTOLIC BLOOD PRESSURE: 154 MMHG | OXYGEN SATURATION: 98 % | HEART RATE: 87 BPM | DIASTOLIC BLOOD PRESSURE: 93 MMHG | RESPIRATION RATE: 18 BRPM

## 2021-11-30 DIAGNOSIS — N81.6 RECTOCELE: ICD-10-CM

## 2021-11-30 DIAGNOSIS — R15.0 INCOMPLETE DEFECATION: ICD-10-CM

## 2021-11-30 DIAGNOSIS — R39.15 URGENCY OF URINATION: Primary | ICD-10-CM

## 2021-11-30 DIAGNOSIS — K59.02 CONSTIPATION DUE TO OUTLET DYSFUNCTION: ICD-10-CM

## 2021-11-30 DIAGNOSIS — R35.0 URINARY FREQUENCY: ICD-10-CM

## 2021-11-30 DIAGNOSIS — R39.15 URINARY URGENCY: ICD-10-CM

## 2021-11-30 DIAGNOSIS — N81.11 CYSTOCELE, MIDLINE: ICD-10-CM

## 2021-11-30 PROCEDURE — 99213 OFFICE O/P EST LOW 20 MIN: CPT | Performed by: OBSTETRICS & GYNECOLOGY

## 2021-11-30 PROCEDURE — 52285 CYSTOSCOPY AND TREATMENT: CPT | Performed by: OBSTETRICS & GYNECOLOGY

## 2021-11-30 RX ORDER — FLUTICASONE PROPIONATE 50 MCG
1 SPRAY, SUSPENSION (ML) NASAL DAILY
COMMUNITY

## 2021-11-30 ASSESSMENT — ENCOUNTER SYMPTOMS
SINUS PRESSURE: 1
CONSTIPATION: 1

## 2021-11-30 NOTE — LETTER
[facility]  [address] [place] [address] Brandon Pichardo  Phone: [phone]  Fax: [phone]    Faye Mendes MD    November 30, 2021     Damon Mcclain, [address] [place] [address]    Patient: Barb Kenny   MR Number: <[id number]>   YOB: 1965   Date of Visit: 11/30/2021       Dear Damon Mcclain:    Thank you for referring Barb Kenny to me for evaluation/treatment. Below are the relevant portions of my assessment and plan of care. If you have questions, please do not hesitate to call me. I look forward to following Jane Briones along with you.     Sincerely,      Faye Mendes MD

## 2021-11-30 NOTE — PROGRESS NOTES
(DITROPAN-XL) 10 MG extended release tablet Take 1 tablet by mouth daily (Patient not taking: Reported on 11/30/2021) 30 tablet 1    albuterol sulfate HFA (VENTOLIN HFA) 108 (90 Base) MCG/ACT inhaler Inhale 2 puffs into the lungs 4 times daily as needed for Wheezing (Patient not taking: Reported on 10/5/2021) 3 Inhaler 1    ondansetron (ZOFRAN) 8 MG tablet Take 1 tablet by mouth every 8 hours as needed for Nausea or Vomiting (Patient not taking: Reported on 10/5/2021) 30 tablet 0     No current facility-administered medications for this visit. Allergies: No Known Allergies  Social History:   Social History     Socioeconomic History    Marital status:      Spouse name: Not on file    Number of children: Not on file    Years of education: Not on file    Highest education level: Not on file   Occupational History    Occupation: [employer]     Employer: [employer]   Tobacco Use    Smoking status: Never Smoker    Smokeless tobacco: Never Used   Vaping Use    Vaping Use: Never used   Substance and Sexual Activity    Alcohol use: Yes     Alcohol/week: 1.0 standard drink     Types: 1 Standard drinks or equivalent per week     Comment: rarely    Drug use: Never    Sexual activity: Yes     Partners: Male     Birth control/protection: Surgical   Other Topics Concern    Not on file   Social History Narrative    Not on file     Social Determinants of Health     Financial Resource Strain:     Difficulty of Paying Living Expenses: Not on file   Food Insecurity:     Worried About Running Out of Food in the Last Year: Not on file    Rowan of Food in the Last Year: Not on file   Transportation Needs:     Lack of Transportation (Medical): Not on file    Lack of Transportation (Non-Medical):  Not on file   Physical Activity:     Days of Exercise per Week: Not on file    Minutes of Exercise per Session: Not on file   Stress:     Feeling of Stress : Not on file   Social Connections:     Frequency of Communication with Friends and Family: Not on file    Frequency of Social Gatherings with Friends and Family: Not on file    Attends Jain Services: Not on file    Active Member of Clubs or Organizations: Not on file    Attends Club or Organization Meetings: Not on file    Marital Status: Not on file   Intimate Partner Violence:     Fear of Current or Ex-Partner: Not on file    Emotionally Abused: Not on file    Physically Abused: Not on file    Sexually Abused: Not on file   Housing Stability:     Unable to Pay for Housing in the Last Year: Not on file    Number of [place] in the Last Year: Not on file    Unstable Housing in the Last Year: Not on file     Family History:   Family History   Problem Relation Age of Onset    Cancer Mother     Dementia Mother         breast cancer    Breast Cancer Mother     Arthritis Mother     Lung Cancer Mother     Other Father         MVA    Alcohol Abuse Father     Alzheimer's Disease Maternal Grandmother     Heart Failure Maternal Grandfather     Hypertension Maternal Grandfather     Breast Cancer Maternal Cousin     Breast Cancer Paternal Cousin      Review of System  Review of Systems   HENT: Positive for postnasal drip and sinus pressure. Gastrointestinal: Positive for constipation. Genitourinary: Positive for pelvic pain. Allergic/Immunologic: Positive for environmental allergies. All other systems reviewed and are negative. A review of systems was done by the patient and reviewed by me. Objective:     Vital Signs  Vitals:    11/30/21 0907   BP: (!) 154/93   Pulse: 87   Resp: 18   Temp: 97.3 °F (36.3 °C)   SpO2: 98%      Physical Exam  Physical Exam  HENT:      Head: Normocephalic and atraumatic. Eyes:      Conjunctiva/sclera: Conjunctivae normal.   Pulmonary:      Effort: Pulmonary effort is normal.   Abdominal:      Palpations: Abdomen is soft. Musculoskeletal:      Cervical back: Normal range of motion and neck supple.    Skin: General: Skin is warm and dry. Neurological:      Mental Status: She is alert and oriented to person, place, and time. Procedure: The urethral was anesthesized with topical lidocaine jelly and dilated to a #14 french. A 0-degree urethroscope was used to examine the urethra. A 70-degree cystoscope was used to evaluate the bladder. FINDINGS:  1. Urethra was normal  2. 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 Trigone appeared Normal  4. Ureters illustrated bilateral efflux  5. Patient exhibited spasms during the study no        No results found for this visit on 11/30/21. Assessment/Plan:     Alberto Antonio is a 55 y.o. female with   1. Urgency of urination    2. Urinary frequency    3. Urinary urgency    4. Constipation due to outlet dysfunction    5. Rectocele    6. Incomplete defecation    7. Cystocele, midline    Old records reviewed, cystourethroscopy was reviewed    She is following up with pelvic floor physical therapy and has noticed a significant improvement in her incomplete defecation. She also notices improvement in the tightness overall. She does complain of some irritative bladder type symptoms with urgency frequency and some burning and states that historically she has had some instances where her cultures have all been negative. The next time she begins to feel irritation or a flare she is going to come into the office and we would will check her urine for infection but consider giving her an installation. She will follow-up with me on an as-needed basis. Orders Placed This Encounter   Procedures    IL CYSTOSCOPY,RX FEMALE URETHRAL SYND     No orders of the defined types were placed in this encounter.       Mervin Philip MD

## 2021-12-01 ENCOUNTER — HOSPITAL ENCOUNTER (OUTPATIENT)
Dept: PHYSICAL THERAPY | Age: 56
Setting detail: THERAPIES SERIES
Discharge: HOME | End: 2021-12-01
Payer: COMMERCIAL

## 2021-12-01 PROCEDURE — 97530 THERAPEUTIC ACTIVITIES: CPT | Performed by: PHYSICAL THERAPIST

## 2021-12-01 PROCEDURE — 97110 THERAPEUTIC EXERCISES: CPT | Performed by: PHYSICAL THERAPIST

## 2021-12-01 PROCEDURE — 97140 MANUAL THERAPY 1/> REGIONS: CPT | Performed by: PHYSICAL THERAPIST

## 2021-12-02 ENCOUNTER — IMMUNIZATION (OUTPATIENT)
Dept: FAMILY MEDICINE CLINIC | Age: 56
End: 2021-12-02

## 2021-12-02 PROCEDURE — 91300 COVID-19, PFIZER VACCINE 30MCG/0.3ML DOSE: CPT | Performed by: FAMILY MEDICINE

## 2021-12-02 PROCEDURE — 0004A COVID-19, PFIZER VACCINE 30MCG/0.3ML DOSE: CPT | Performed by: FAMILY MEDICINE

## 2021-12-08 NOTE — PROGRESS NOTES
[facility] Outpatient Physical Therapy  [place] [address]  Phone: [phone]  Fax: [phone]        Physical Therapy Daily Treatment Note    Date: 2021    Patient Name: Ramona Jean-Baptiste : 1965 MRN: [id number]    Restrictions/Precautions:    Medical/Treatment Diagnosis Information:    · Diagnosis: R39.15 - Urinary urgency    Insurance/Certification information: PT Insurance Information: [employer] P[employer]    Physician Information: Referring Practitioner: Bran Das MD    Plan of care signed (Y/N): Vinnie Nieves by: Karin Templeton MD at 11/3/2021  8:56 AM         Visit# / total visits:  per insurance      Time in: 1010  Time Out: 1120  Total Treatment Time: 70 minutes    ________________________________________________________________________________________    Pain Level: had pain this morning - \"sharp pain\" after urinating but did have some constipation over this past weekend    SUBJECTIVE:  Overall feeing better with less frequency and intensity of intermittent pain with urination. Patient reports being out of town visiting with her kids. Was constipated but performed yoga, took Miralx and was able to have BM- felt better. Stayed at her son's home longer that expected so was off schedule. Stretches are helpful, does feel soreness in mid to lower back after inverted yoga stretches - goes away with cat/cow stretch.        OBJECTIVE:    Treatment Interventions Implemented     HEP instruction: Written HEP instructions provided and reviewed     Diaphragmatic breathing - coordinating with pelvic floor muscle activities - tactile cues on stomach - cues for PF descent with inhalation, cues to avoid PF holding contractions     PF muscle control exercises -  supine - transvaginal feedback - QUICK FLICKS - 10 reps with cues to use for urge suppression and may practice for exercise ~2 times/day    Yoga in mostly inverted positions with legs up on wall for PF relaxation and LE stretches with diaphragmatic breathing - 8 poses holding each for 1 minute= ~8-10+ slow, deep breaths    Piriformis stretch - figure four - sitting - hold 8-10 breaths/~1 minute x 2 reps each LE - cues for proper technique to keep arch in back, look ahead, and lean chest forward     Happy baby - hold for 1 minute = 10 slow, deep breaths s 2-3 reps OR 2-3 minutes total    Hip flexor stretch with each LE over side of mat table - -  hold for 1 minute = 10 slow, deep breaths s 2-3 reps  - tightness noted L>R    Lateral thigh stretch/piriformis stretch - supine -  hold for 1 minute = 10 slow, deep breaths s 2-3 reps     Hip add stretch - supine - hold 8-10 breaths/~1 minute x 2-3 reps each LE OR 2-3 minutes total       Manual Interventions -   Patient verbally consented to PF muscle assessment and intervention. Cross friction massage to lower abdominal/suprapubic scar from previous c-sections with moderate adhesions noted, especially at midline. Generalized visceral mobilization and colon massage - reviewed self-colon massage and self- cross friction massage to scar. Hip adductor massage and myofascial release - noted moderate tightness and tenderness, L>R. Added hip adductor stretch - see above. Superficial external myofascial release and massage to transverse perineal and external anal sphincter muscles with moderate tenderness and tightness/tension in transverse perineal L>R - focus on posterior stretch of tissues with mild release noted prior to transvaginal work.      Transvaginal myofascial release and stretching of superficial, middle, and deep layers of PF muscles with focus on areas of increased tension and tissue resistance-  moderate to severe tenderness and tightness noted in superficial to middle to midline to L side of PF, so focused myofascial release transvaginally to L PF superficial and middle layers and externally to L buttocks/thigh, hip adductors, and abdommen simultaneously. Mild in R PF with less focus to this area. Deferred prone due to time constraints, but previously deep piriformis and obturator myofascial release follow by massage to gluts and then superficial myofascial release to buttock, lower back and upper thigh on L.        Patient Education -   Reviewed anatomy of pelvis including PF muscles and pelvic organs. Reviewed need for stretching and down training of tight muscles and strengthening of weak muscles to promote improve muscle balance in pelvis/low back and legs. Patient appeared to have better understanding of current bladder/bowel issues and improved outlook on situation by end of PF PT therapy session.  Previously issued and reviewed handouts on normal bladder , contributing factors to PF dysfunction, health/bladder irritants, diaphragmatic breathing, quick flicks for urge suppression, constipation and pelvic organ prolapse.     Reviewed differences in sympathetic and parasympathetic nervous systems. Discussed strategies to down regulate her sympathetic (flight or fright) nervous system by implementing deep breathing and other activities to stimulate the parasympathetic (rest and digest) nervous system. Reviewed benefits of stretches and deep breathing for down regulation of nervous system. Reviewed relationship of bladder irritation and tightness/tension in surrounding muscles and tissues. Emphasis on need for scar mobility. Benefits of colon massage and cross friction massage to scar tissue especially with restriction that may be effecting her bladder. Reviewed need for decreasing constipation since this adds to pressure in pelvic area and thus contributing to intermittent bladder discomfort and pain. Continue education on need for diaphragmatic breathing and stretching muscles attached to pelvis to improve overall health including mobility of back, hips, PF, and organs of pelvis and abdomen. ASSESSMENT:  Patient reports continued increased awareness of her PF tension/tightness with overall less frequency and intensity of sharp pain centered around urination. Patient continues to recognize benefits of stretches both manually and directly applied to her PF muscles as well as for surrounding muscles attached to her pelvis, including scar tissue in lower abdomen. Patient initially with moderate to severe tenderness and tightness in superficial to middle layers for middle to L PF muscles which improved with manual techniuqes. Instructed in hip flexor, hip adductor, and lateral thigh/piriformis stretching with focus on diaphragmatic breathing and overall relaxation/down regulation of nervous system. Patient responded well to cross friction massage followed by generalized visceral and colon massage - noted moderate adhesions in  scar that may be limiting mobility of underlying tissues surrounding the bladder and may contributing to increased sensitivity of the bladder as well as bowel symptoms. Patient would definitely benefit from pelvic floor physical therapy for continued manual interventions, education on healthy bladder/bowel habits and adjustment of behavioral modifications as well as advanced activities to improve muscle control/ coordination and endurance of pelvic floor, core, and hip muscles to decrease urinary frequency and urgency, and decrease constipation with less need to strain during BMs to improve or at least prevent cystocele from worsening. Treatment/Activity Tolerance:    Patient tolerated treatment well   Reports mild improvements in PF and surrounding muscle tightness/tension after manual techniques and overall less frequency and intensity of pain surrounding urination. Other:    GOALS:  Patient stated goal: \" learn to have proper bowel movements without straining and prevent progression of rectocele\"  [x]? Progressing: []? Met: []?  Not Met: []? Adjusted        Therapist goals for Patient:      Short Term Goals: To be achieved in: 4-5 sessions     1. Patient will have a decrease in pain with defication and less urgency and frequency with urination to indicate improvement in pelvic floor strength and relaxation, muscle coordination, and/or bladder retraining.  []? Progressing: [x]? Met: []? Not Met: []? Adjusted  2. Perform HEP for pelvic floor and core muscle groups with minimal direction from therapist as she progresses to become more independent managing her pelvic pressure and PF muscle weakness/tightness. [x]? Progressing: []? Met: []? Not Met: []? Adjusted  3. Report using 1-2 behavioral modification strategies to reduce urinary/bowel complaints through dietary and mechanical changes, with focus on full emptying of bladder with each urination and using optimal positioning and deep breathing for relaxation of posterior PF muscles during defacation, reducing need to strain. []? Progressing: [x]? Met: []? Not Met: []? Adjusted     Long Term Goals: To be achieved in: 8-10 sessions     1. Improve score of quality of life index measure, PFDI-20, to 90 or less (initial eval - 118.75) disability index to assist with reaching prior level of function and demonstrating improved quality of life with less life interruptions due to pelvic pressure due to rectocele as well as urinary urgency and frequency, allowing patient to fully participate in social activities, including spending time with her children. []? Progressing: []? Met: []? Not Met: []? Adjusted  2. Patient will return to functional activities including household chores and light walking for exercise without increased symptoms or restriction. []? Progressing: []? Met: []? Not Met: []? Adjusted  3. Patient will report decreased constipation with increased ease to pass stool with improved consistency and less pain. []? Progressing: []? Met: []? Not Met: []? Adjusted            4.  Report using 3-4

## 2021-12-14 ENCOUNTER — HOSPITAL ENCOUNTER (OUTPATIENT)
Dept: PHYSICAL THERAPY | Age: 56
Setting detail: THERAPIES SERIES
Discharge: HOME | End: 2021-12-14
Payer: COMMERCIAL

## 2021-12-14 PROCEDURE — 97110 THERAPEUTIC EXERCISES: CPT | Performed by: PHYSICAL THERAPIST

## 2021-12-14 PROCEDURE — 97530 THERAPEUTIC ACTIVITIES: CPT | Performed by: PHYSICAL THERAPIST

## 2021-12-14 PROCEDURE — 97140 MANUAL THERAPY 1/> REGIONS: CPT | Performed by: PHYSICAL THERAPIST

## 2021-12-29 ENCOUNTER — HOSPITAL ENCOUNTER (OUTPATIENT)
Dept: PHYSICAL THERAPY | Age: 56
Setting detail: THERAPIES SERIES
Discharge: HOME | End: 2021-12-29
Payer: COMMERCIAL

## 2021-12-29 ENCOUNTER — NURSE ONLY (OUTPATIENT)
Dept: INTERNAL MEDICINE CLINIC | Age: 56
End: 2021-12-29

## 2021-12-29 DIAGNOSIS — J06.9 VIRAL UPPER RESPIRATORY TRACT INFECTION: Primary | ICD-10-CM

## 2021-12-29 DIAGNOSIS — J06.9 VIRAL UPPER RESPIRATORY TRACT INFECTION: ICD-10-CM

## 2021-12-29 NOTE — PROGRESS NOTES
Physical Therapy  Women's Health - Pelvic Floor    Cancellation/No-show Note  Patient Name:  Aden Hassan  :  1965   Date:  2021  Cancelled visits to date: 1, 21 - ill   No-shows to date: 0    For today's appointment patient:  [x]  Cancelled  []  Rescheduled appointment  []  No-show     Reason given by patient:  [x]  Patient ill  []  Conflicting appointment  []  No transportation    []  Conflict with work  []  No reason given  []  Other:     Comments:   Called and left message with outpatient  personnel to inform that she was ill and unable to attend her appointment at [place] [address] today. Will need to reschedule once feeling better.      Electronically signed by:  Darlin Thakkar, PT #[id number]

## 2021-12-30 LAB — SARS-COV-2: NOT DETECTED

## 2022-01-02 ENCOUNTER — E-VISIT (OUTPATIENT)
Dept: PRIMARY CARE CLINIC | Age: 57
End: 2022-01-02
Payer: COMMERCIAL

## 2022-01-02 DIAGNOSIS — H92.09 OTALGIA, UNSPECIFIED LATERALITY: ICD-10-CM

## 2022-01-02 DIAGNOSIS — J01.90 ACUTE NON-RECURRENT SINUSITIS, UNSPECIFIED LOCATION: Primary | ICD-10-CM

## 2022-01-02 PROCEDURE — 99422 OL DIG E/M SVC 11-20 MIN: CPT | Performed by: NURSE PRACTITIONER

## 2022-01-02 RX ORDER — AMOXICILLIN AND CLAVULANATE POTASSIUM 875; 125 MG/1; MG/1
1 TABLET, FILM COATED ORAL 2 TIMES DAILY
Qty: 20 TABLET | Refills: 0 | Status: SHIPPED | OUTPATIENT
Start: 2022-01-02 | End: 2022-01-12

## 2022-01-02 ASSESSMENT — LIFESTYLE VARIABLES: SMOKING_STATUS: NO, I'VE NEVER SMOKED

## 2022-01-03 NOTE — PATIENT INSTRUCTIONS
Patient Education        Saline Nasal Washes: Care Instructions  Your Care Instructions     Saline nasal washes help keep the nasal passages open by washing out thick or dried mucus. This simple remedy can help relieve symptoms of allergies, sinusitis, and colds. It also can make the nose feel more comfortable by keeping the mucous membranes moist. You may notice a little burning sensation in your nose the first few times you use the solution, but this usually gets better in a few days. Follow-up care is a key part of your treatment and safety. Be sure to make and go to all appointments, and call your doctor if you are having problems. It's also a good idea to know your test results and keep a list of the medicines you take. How can you care for yourself at home? · You can buy premixed saline solution in a squeeze bottle or other sinus rinse products at a drugstore. Read and follow the instructions on the label. · You also can make your own saline solution by adding 1 teaspoon of salt and 1 teaspoon of baking soda to 2 cups of distilled water. · If you use a homemade solution, pour a small amount into a clean bowl. Using a rubber bulb syringe, squeeze the syringe and place the tip in the salt water. Pull a small amount of the salt water into the syringe by relaxing your hand. · Sit down with your head tilted slightly back. Do not lie down. Put the tip of the bulb syringe or the squeeze bottle a little way into one of your nostrils. Gently drip or squirt a few drops into the nostril. Repeat with the other nostril. Some sneezing and gagging are normal at first.  · Gently blow your nose. · Wipe the syringe or bottle tip clean after each use. · Repeat this 2 or 3 times a day. · Use nasal washes gently if you have nosebleeds often. When should you call for help?   Watch closely for changes in your health, and be sure to contact your doctor if:    · You often get nosebleeds.     · You have problems doing the nasal washes. Where can you learn more? Go to https://chpepiceweb.[employer]. org and sign in to your [employer] account. Enter [phone] in the [facility] box to learn more about \"Saline Nasal Washes: Care Instructions. \"     If you do not have an account, please click on the \"Sign Up Now\" link. Current as of: September 8, 2021               Content Version: 13.1  © 2006-2021 Healthwise, [facility]. Care instructions adapted under license by [facility] ([facility]). If you have questions about a medical condition or this instruction, always ask your healthcare professional. [address] any warranty or liability for your use of this information.

## 2022-01-03 NOTE — PROGRESS NOTES
Reviewed questionnaire  Reviewed previous encounters, medications, allergies and history     Dx sinusitis  Otalgia     Plan   rx for augmentin  Recommend flonase over the counter     1. Water: Drink 1/2 of body weight (lb) in ounces,  Up to 90 Ounces of water per day. This will loosen mucus in the head and chest & improve the weak feeling of dehydration, allow the body to get germ fighting resources to the infection. Half can be juice or sugar free powerade or garorate. Don't count drinks with caffeine or carbonation. Infants can have Pedialyte liquid or freezer pops. Avoid salt if you have high Blood Pressure, swelling in the feet or ankles or have heart problems. 2. Humidity: Humidify the air to 35-50% ( or until the windows fog over slightly). Summer use of an air conditioner turned down too far and can result in dry air. Can use a humidifier, vaporizer, boil water on the stove or put a coffee can full of water on the heater vents. This will loosen mucus from infections and allergies. 3. Sleep: Get 8-10 hours a night and rest during the evening after work or school. If you have trouble sleeping, adults can take Melatonin 5mg up to 2 tabs at bedtime ( not for children or pregnant women). If Mono is suspected then sleep during 9PM to 9AM time span (if possible.)   4. Cough: Take cough medicines with Guaifenesin ( to loosen chest or head congestion) and Dextromethorphan ( to decrease excess cough). Robitussin D.M. Syrup every 4-6 hrs or Mucinex D. M. pills twice a day. Use the pediatric formulations for children over 6 months making sure they are alcohol & sugar free for children, pregnant women, and diabetics. 5. Pain And Fevers: Take Acetaminophen ( Tylenol) for fevers, aches, and headaches. 2-500 mg every 8 hours for adults. Appropriate doses at bedtime for children may help them sleep better. Ibuprofen may be used if not pregnant, but should be given with food to avoid nausea.  Avoid Ibuprofen if you have high blood pressure, CHF, or kidney problems. 6.Gargle: (DAY ONE OF SYMPTOMS) Gargle in the back of the throat with the head tilted back and to the sides with a strong mouthwash  ( Listerine or Scope) after meals and at bedtime at least 4 -5 times a day. This helps kill bacteria and viruses in the back of the throat and will shorten the duration and decrease the severity of your symptoms: sore throat, cough, ear popping,/ear pain, and possibly dizziness. 7. Smoking: Avoid smoking or exposure to second hand smoke. 8. Zinc: (DAY ONE OF SYMPTOMS)  Zinc lozenges such as Cold Kendall (available most stores), or Basic ([employer] brand) will help shorten the duration and lessen symptoms such as sore throat, cough, nasal congestion, runny nose, and post nasal drip. Use 1 lozenge every 2-4 hours ( after meals if stomach is sensitive). Children can use 10-15 mg or less 3-4 times a day or Zinc lollypops. In pregnancy limit to 50-60 mg a day for 7 days as prenatals have Zinc also. With diarrhea use zinc pills 50 mg 1/2 to 1 pill 2x/day. 9. Vitamins: Vitamin C 500 mg with breakfast and dinner. Children and pregnant women should drink citrus juices. This speeds healing and strengthens immune system. 10. Chest Symptoms: Vicks Vapor rub to the chest at bedtime. 11. Decongestants: Avoid all decongestants if you have high blood pressure. Safe to take if you do not have high blood pressure. Try all of the above starting with day 1 of symptoms. If Strep throat symptoms appear call to be seen in the office as soon as possible and don't gargle on that day. Newborns, infants, or anyone with earaches or influenza may need to be seen quickly. Adults with fevers over 103 degrees or shortness of breath should call the office immediately. 12. Nasal saline spray or rinse. There are many different ways to do this OTC. I hope that you feel better. If you do not feel better after treatment, please f/u with pcp.       Time spent 11-20 minutes

## 2022-01-13 NOTE — PROGRESS NOTES
[facility] Outpatient Physical Therapy  [facility] [place] [address]  Phone: [phone]  Fax: [phone]        Physical Therapy Daily Treatment Note    Date: 2022    Patient Name: Josh Venegas : 1965 MRN: [id number]    Restrictions/Precautions:    Medical/Treatment Diagnosis Information:    · Diagnosis: R39.15 - Urinary urgency    Insurance/Certification information: PT Insurance Information: [employer] [place] [employer]    Physician Information: Referring Practitioner: Letty Pride MD    Plan of care signed (Y/N): Keya Calixto by: Jessica Lyons MD at 11/3/2021  8:56 AM         Visit# / total visits:  per insurance      Time in: 060  Time Out: 905  Total Treatment Time: 60 minutes    ________________________________________________________________________________________    Pain Level: denies pain - see subjective    SUBJECTIVE:  Patient reports that she tested for COVID twice, but ended up having strep. Patient has been trying to perform stretches regularly, but was not when she was not feeling well. Denies any specific complaints of pain, occasional twinges of pain with movement of colon/bowels. Stretches are helpful, does feel soreness in mid to lower back after inverted yoga stretches - goes away with cat/cow stretch.        OBJECTIVE:     Treatment Interventions Implemented     HEP instruction: Written HEP instructions provided and reviewed     Diaphragmatic breathing - coordinating with pelvic floor muscle activities - tactile cues on stomach - cues for PF descent with inhalation, cues to avoid PF holding contractions - used throughout all stretching activities    PF muscle control exercises -  supine - transvaginal feedback - QUICK FLICKS - 10 reps with cues to use for urge suppression and may practice for exercise ~2 times/day    Yoga in mostly inverted positions with legs up on wall for PF relaxation and LE stretches with diaphragmatic breathing - 8 poses holding each for 1 minute= ~8-10+ slow, deep breaths    Piriformis stretch - figure four - supine - hold 8-10 breaths/~1 minute x 2 reps each LE - cues for proper technique and used pillowcase to assist with opposite leg knee to chest for effective stretch     Happy baby - hold for 1 minute = 10 slow, deep breaths s 2-3 reps OR 2-3 minutes total    Hip flexor stretch with each LE over side of mat table - -  hold for 1 minute = 10 slow, deep breaths s 2-3 reps  - tightness noted L>R    Hip flexor stretch in half kneel as alternative to supine  - -  hold for 1 minute = 10 slow, deep breaths s 2-3 reps       Lateral thigh stretch/piriformis stretch - supine -  hold for 1 minute = 10 slow, deep breaths s 2-3 reps - required review to improve effectiveness as was combining 2 different stretches     Hip add stretch - supine - hold 8-10 breaths/~1 minute x 2-3 reps each LE OR 2-3 minutes total    Core stabilization activities - adding alternating shoulder flex, alternating hip flex, the alternating opposite hip/shoulder flex x 10 reps while maintaining TA contraction with ppt.            Manual Interventions -   Patient verbally consented to PF muscle assessment and intervention. Deferred to focus on introduction of core stabilization, educate in body mechanics, and review/add alternatives for her stretches. During previous session on 12/14 -   Cross friction massage to lower abdominal/suprapubic scar from previous c-sections with moderate adhesions noted, especially at midline. Generalized visceral mobilization and colon massage - reviewed self-colon massage and self- cross friction massage to scar. Hip adductor massage and myofascial release - noted moderate tightness and tenderness, L>R. Added hip adductor stretch - see above.       Superficial external myofascial release and massage to transverse perineal and external anal sphincter muscles with moderate tenderness and tightness/tension in transverse perineal L>R - focus on posterior stretch of tissues with mild release noted prior to transvaginal work. Transvaginal myofascial release and stretching of superficial, middle, and deep layers of PF muscles with focus on areas of increased tension and tissue resistance-  moderate to severe tenderness and tightness noted in superficial to middle to midline to L side of PF, so focused myofascial release transvaginally to L PF superficial and middle layers and externally to L buttocks/thigh, hip adductors, and abdommen simultaneously. Mild in R PF with less focus to this area. Deferred prone due to time constraints, but previously deep piriformis and obturator myofascial release follow by massage to gluts and then superficial myofascial release to buttock, lower back and upper thigh on L.        Patient Education -   Reviewed anatomy of pelvis including PF muscles and pelvic organs. Reviewed need for stretching and down training of tight muscles and strengthening of weak muscles to promote improve muscle balance in pelvis/low back and legs. Patient appeared to have better understanding of current bladder/bowel issues and improved outlook on situation by end of PF PT therapy session.  Previously issued and reviewed handouts on normal bladder , contributing factors to PF dysfunction, health/bladder irritants, diaphragmatic breathing, quick flicks for urge suppression, constipation and pelvic organ prolapse.     Reviewed differences in sympathetic and parasympathetic nervous systems. Discussed strategies to down regulate her sympathetic (flight or fright) nervous system by implementing deep breathing and other activities to stimulate the parasympathetic (rest and digest) nervous system. Reviewed benefits of stretches and deep breathing for down regulation of nervous system. Reviewed relationship of bladder irritation and tightness/tension in surrounding muscles and tissues.   Emphasis on need for scar mobility. Benefits of colon massage and cross friction massage to scar tissue especially with restriction that may be effecting her bladder. Reviewed need for decreasing constipation since this adds to pressure in pelvic area and thus contributing to intermittent bladder discomfort and pain. Patient is better relating her constipation to her bladder issues. Continue education on need for diaphragmatic breathing and stretching muscles attached to pelvis to improve overall health including mobility of back, hips, PF, and organs of pelvis and abdomen. Issued and reviewed body mechanics booklet with attention to contraction of TA in preparation for all functional activities and exhaling on exertion. Educated in need for coordination of core stabilizing muscle group with less clenching of buttocks and PF muscles. ASSESSMENT:  Patient reports only occasional pain centered around bowel movements more so than urination. Patient continues to recognize benefits of stretches for surrounding muscles attached to her pelvis, including scar tissue in lower abdomen. Deferred manual techniques to introduce core stabilization activies and body mechanics. Reviewed LE stretches with modifications made to improve effectiveness and continued to emphasize diaphragmatic breathing and overall relaxation/down regulation of nervous system. Patient would definitely benefit from pelvic floor physical therapy for continued manual interventions, education on healthy bladder/bowel habits and adjustment of behavioral modifications as well as advanced activities to improve muscle control/ coordination and endurance of pelvic floor, core, and hip muscles to decrease urinary frequency and urgency, and decrease constipation with less need to strain during BMs to improve or at least prevent cystocele from worsening.       Treatment/Activity Tolerance:    Patient tolerated treatment well   Patient satisfied to focus on stretching and core stability exercises, therefore deferred manual techniques. Other:    GOALS:  Patient stated goal: \" learn to have proper bowel movements without straining and prevent progression of rectocele\"  [x]? Progressing: []? Met: []? Not Met: []? Adjusted        Therapist goals for Patient:      Short Term Goals: To be achieved in: 4-5 sessions     1. Patient will have a decrease in pain with defication and less urgency and frequency with urination to indicate improvement in pelvic floor strength and relaxation, muscle coordination, and/or bladder retraining.  []? Progressing: [x]? Met: []? Not Met: []? Adjusted  2. Perform HEP for pelvic floor and core muscle groups with minimal direction from therapist as she progresses to become more independent managing her pelvic pressure and PF muscle weakness/tightness. []? Progressing: [x]? Met: []? Not Met: []? Adjusted  3. Report using 1-2 behavioral modification strategies to reduce urinary/bowel complaints through dietary and mechanical changes, with focus on full emptying of bladder with each urination and using optimal positioning and deep breathing for relaxation of posterior PF muscles during defacation, reducing need to strain. []? Progressing: [x]? Met: []? Not Met: []? Adjusted     Long Term Goals: To be achieved in: 8-10 sessions     1. Improve score of quality of life index measure, PFDI-20, to 90 or less (initial eval - 118.75) disability index to assist with reaching prior level of function and demonstrating improved quality of life with less life interruptions due to pelvic pressure due to rectocele as well as urinary urgency and frequency, allowing patient to fully participate in social activities, including spending time with her children. []? Progressing: []? Met: []? Not Met: []? Adjusted  2. Patient will return to functional activities including household chores and light walking for exercise without increased symptoms or restriction. []? Progressing: []? Met: []? Not Met: []? Adjusted  3. Patient will report decreased constipation with increased ease to pass stool with improved consistency and less pain. []? Progressing: []? Met: []? Not Met: []? Adjusted            4. Report using 3-4 behavioral modification strategies to reduce urinary/bowel complaints through dietary and mechanical changes, with focus on full emptying of bladder with each urination and using optimal positioning and deep breathing for relaxation of posterior PF muscles during defacation, reducing need to strain. []? Progressing: []? Met: []? Not Met: []? Adjusted            5. Rate severity of the problem related to urinary frequency/urgency and/or pelvic pain/discomfort with passing stool to 3-4/10 or less on scale of 0-10 with 0 being no problem and 10 being the worst - (7/10 reported at intial eval). []? Progressing: []? Met: []? Not Met: []? Adjusted            6. Perform HEP for pelvic floor and core muscle groups independently as she progresses to self-manage her pelvic pressure and PF muscle weakness/tightness. []? Progressing: []? Met: []? Not Met: []? Adjusted                        Plan:   Continue per plan of care     May address manual treatments to address areas of concern in and around bladder if symptoms return. If not, reviewed HEP, may progress core stabilization activities to seated on therapy ball. May consider DC if patient feels that she is managing her symptoms well. Frequency/Duration:     Patient is progressing well and agreeable to next follow up in about 1 month.   Patient's next scheduled appointment is on 2/15 at [facility].    Electronically signed by Mike Muñoz, PT #[id number] on 1/18/2022 at 1:31 PM

## 2022-01-18 ENCOUNTER — HOSPITAL ENCOUNTER (OUTPATIENT)
Dept: PHYSICAL THERAPY | Age: 57
Setting detail: THERAPIES SERIES
Discharge: HOME | End: 2022-01-18
Payer: COMMERCIAL

## 2022-01-18 PROCEDURE — 97530 THERAPEUTIC ACTIVITIES: CPT | Performed by: PHYSICAL THERAPIST

## 2022-01-18 PROCEDURE — 97110 THERAPEUTIC EXERCISES: CPT | Performed by: PHYSICAL THERAPIST

## 2022-01-18 PROCEDURE — 97112 NEUROMUSCULAR REEDUCATION: CPT | Performed by: PHYSICAL THERAPIST

## 2022-02-10 NOTE — PROGRESS NOTES
[address] Outpatient Physical Therapy - Pelvic Health  Phone: [phone]   Fax: [phone]      Physical Therapy Discharge Summary  Pelvic Floor Physical Therapy     Dear Referring Practitioner: Ra Rodriguez MD,     We had the pleasure of treating the following patient for physical therapy services at [address] [place] [address]. A summary of our findings can be found in the discharge summary below. If you have any questions or concerns regarding these findings, please do not hesitate to contact me at the office phone number above. Thank you for the referral.            Date: 2/15/2022    Patient: Ramila Giraldo   : 1965   MRN: [id number]  Referring Physician: Referring Practitioner: Ra Rodriguez MD      Evaluation Date: 2/15/2022      Medical Diagnosis Information:  Diagnosis: R39.15 - Urinary urgency                                             Insurance information: PT Insurance Information: [employer] [place] PPO - changed to [place] in           Functional Outcome:              PFDI-20 Score: 61.46  Sub-sections:  POPDI-6 Score : 12.5;  CRADI-8 Score : 28.13;  KIT-6 Score : 20.83                            Overall Response to Treatment:              [x]Patient is responding well to treatment and improvement is noted with regards  to goals              [x]Patient should continue to improve in reasonable time if they continue HEP              []Patient has plateaued and is no longer responding to skilled PT intervention                    []Patient is getting worse and would benefit from return to referring MD              []Patient unable to adhere to initial POC              []Other:      Date range of Visits: 2021-2/15/2022  Total Visits: 6     Recommendation:               [x] Discharge to HEP. Follow up with PT or MD PRN. Please see last progress note below for discharge status when last seen on 2/15/2022. Electronically signed by:  Antony Gomez #[id number]           [address] [facility] Outpatient Physical Therapy  [address] [place] [address]  Phone: [phone]  Fax: [phone]        Physical Therapy Daily Treatment Note    Date: 2/15/2022    Patient Name: Cheryl Lazaro : 1965 MRN: [id number]    Restrictions/Precautions:    Medical/Treatment Diagnosis Information:    · Diagnosis: R39.15 - Urinary urgency    Insurance/Certification information: PT Insurance Information: [employer] [place] PPO    Physician Information: Referring Practitioner: Alfred Perez MD    Plan of care signed (Y/N): M[place] by: Reina Bryant MD at 11/3/2021  8:56 AM         Visit# / total visits:  per insurance      Time in: 900  Time Out: 955  Total Treatment Time: 55 minutes    ________________________________________________________________________________________    Pain Level: reports only intermittent pain - see subjective    SUBJECTIVE:  Patient reports overall feeling better/well overall. Does have occasional issues generally in the morning. Patient did have a fall on the ice and bumped her R hip. She was able to perform her stretches and pain/discomfort subsided. Continues with generalized pain and tightness. , but denies occasional twinges of pain with movement of colon/bowels. Stretches are helpful, does feel soreness in mid to lower back after inverted yoga stretches - goes away with cat/cow stretch. When feeling best, using Miralax regularly. When out of routine, noticing changes and able to make adjustments.        OBJECTIVE:     Treatment Interventions Implemented     HEP instruction: Written HEP instructions provided and reviewed     Diaphragmatic breathing - coordinating with pelvic floor muscle activities - tactile cues on stomach - cues for PF descent with inhalation, cues to avoid PF holding contractions - continued to encourage throughout all stretching activities    PF muscle control exercises -  supine - transvaginal feedback - QUICK FLICKS - 10 reps with cues to use for urge suppression and may practice for exercise ~2 times/day    Yoga in mostly inverted positions with legs up on wall for PF relaxation and LE stretches with diaphragmatic breathing - 8 poses holding each for 1 minute= ~8-10+ slow, deep breaths    Piriformis stretch - figure four - supine - hold 8-10 breaths/~1 minute x 2 reps each LE - cues for proper technique and used pillowcase to assist with opposite leg knee to chest for effective stretch     Happy baby - hold for 1 minute = 10 slow, deep breaths s 2-3 reps OR 2-3 minutes total    Hip flexor stretch with each LE over side of mat table - -  hold for 1 minute = 10 slow, deep breaths s 2-3 reps  - tightness noted L>R    Hip flexor stretch in half kneel as alternative to supine  - -  hold for 1 minute = 10 slow, deep breaths s 2-3 reps       Lateral thigh stretch/piriformis stretch - supine -  hold for 1 minute = 10 slow, deep breaths s 2-3 reps - required review to improve effectiveness as was combining 2 different stretches     Hip add stretch - supine - hold 8-10 breaths/~1 minute x 2-3 reps each LE OR 2-3 minutes total    Advanced - Core stabilization activities - advance from supine with back supported to seated on therapy ball -  alternating shoulder flex, alternating hip flex, the alternating opposite hip/shoulder flex x 10-15 reps each while maintaining TA contraction with ppt. Manual Interventions -   Patient verbally consented to PF muscle assessment and intervention. Deferred to focus on introduction of core stabilization, review of body mechanics, and review of symptom management if experiences flares in the future. During previous session on 12/14 - deferred last several visits  Cross friction massage to lower abdominal/suprapubic scar from previous c-sections with moderate adhesions noted, especially at midline.  Generalized visceral mobilization and colon massage - reviewed self-colon massage and self- cross friction massage to scar. Hip adductor massage and myofascial release - noted moderate tightness and tenderness, L>R. Added hip adductor stretch - see above. Superficial external myofascial release and massage to transverse perineal and external anal sphincter muscles with moderate tenderness and tightness/tension in transverse perineal L>R - focus on posterior stretch of tissues with mild release noted prior to transvaginal work. Transvaginal myofascial release and stretching of superficial, middle, and deep layers of PF muscles with focus on areas of increased tension and tissue resistance-  moderate to severe tenderness and tightness noted in superficial to middle to midline to L side of PF, so focused myofascial release transvaginally to L PF superficial and middle layers and externally to L buttocks/thigh, hip adductors, and abdommen simultaneously. Mild in R PF with less focus to this area. Deferred prone due to time constraints, but previously deep piriformis and obturator myofascial release follow by massage to gluts and then superficial myofascial release to buttock, lower back and upper thigh on L.        Patient Education -   Reviewed anatomy of pelvis including PF muscles and pelvic organs. Reviewed need for stretching and down training of tight muscles and strengthening of weak muscles to promote improve muscle balance in pelvis/low back and legs. Patient appeared to have better understanding of current bladder/bowel issues and improved outlook on situation by end of PF PT therapy session.  Previously issued and reviewed handouts on normal bladder , contributing factors to PF dysfunction, health/bladder irritants, diaphragmatic breathing, quick flicks for urge suppression, constipation and pelvic organ prolapse.     Reviewed differences in sympathetic and parasympathetic nervous systems.   Discussed strategies to down regulate her sympathetic (flight or fright) nervous system by implementing deep breathing and other activities to stimulate the parasympathetic (rest and digest) nervous system. Reviewed benefits of stretches and deep breathing for down regulation of nervous system. Reviewed relationship of bladder irritation and tightness/tension in surrounding muscles and tissues. Emphasis on need for scar mobility. Benefits of colon massage and cross friction massage to scar tissue especially with restriction that may be effecting her bladder. Reviewed need for decreasing constipation since this adds to pressure in pelvic area and thus contributing to intermittent bladder discomfort and pain. Patient is better relating her constipation to her bladder issues. Recognizes need to regulate bowels and limit/avoid constipation to improve bladder symptoms. Reviewed need for continued diaphragmatic breathing and stretching muscles attached to pelvis to improve overall health including mobility of back, hips, PF, and organs of pelvis and abdomen. Reviewed body mechanics with attention to contraction of TA in preparation for all functional activities and exhaling on exertion. Educated in need for coordination of core stabilizing muscle group with less clenching of buttocks and PF muscles. ASSESSMENT:  Patient has met and or exceeded all short term and long term goals and is ready for discontinuing PF PT and managing her ongoing symptoms on her own. Patient continues to recognize benefits of stretches for surrounding muscles attached to her pelvis, including scar tissue in lower abdomen. Deferred manual techniques as patient is certainly less dependent on manual techniques to manage pain and symptoms and was able to advance core stabilization activies and review body mechanics.  Reviewed continued need for LE stretches and continued to emphasize diaphragmatic breathing and overall relaxation/down regulation of nervous system. Patient with significantly less urinary frequency and urgency, and decreased constipation with less need to strain during BMs to improve or at least prevent cystocele from worsening. Treatment/Activity Tolerance:    Patient tolerated treatment well    Patient satisfied to focus on stretching and advance core stability exercises, therefore deferred manual techniques. Other:    GOALS:  Patient stated goal: \" learn to have proper bowel movements without straining and prevent progression of rectocele\"  []? Progressing: [x]? Met: []? Not Met: []? Adjusted        Therapist goals for Patient:      Short Term Goals: To be achieved in: 4-5 sessions     1. Patient will have a decrease in pain with defication and less urgency and frequency with urination to indicate improvement in pelvic floor strength and relaxation, muscle coordination, and/or bladder retraining.  []? Progressing: [x]? Met: []? Not Met: []? Adjusted  2. Perform HEP for pelvic floor and core muscle groups with minimal direction from therapist as she progresses to become more independent managing her pelvic pressure and PF muscle weakness/tightness. []? Progressing: [x]? Met: []? Not Met: []? Adjusted  3. Report using 1-2 behavioral modification strategies to reduce urinary/bowel complaints through dietary and mechanical changes, with focus on full emptying of bladder with each urination and using optimal positioning and deep breathing for relaxation of posterior PF muscles during defacation, reducing need to strain. []? Progressing: [x]? Met: []? Not Met: []? Adjusted     Long Term Goals: To be achieved in: 8-10 sessions     1.  Improve score of quality of life index measure, PFDI-20, to 90 or less (initial eval - 118.75) disability index to assist with reaching prior level of function and demonstrating improved quality of life with less life interruptions due to pelvic pressure due to rectocele as well as urinary urgency and frequency, allowing patient to fully participate in social activities, including spending time with her children. - met - 2/15/2022 - 61.46  []? Progressing: [x]? Met: []? Not Met: []? Adjusted  2. Patient will return to functional activities including household chores and light walking for exercise without increased symptoms or restriction. - met - 2/15/2022   []? Progressing: [x]? Met: []? Not Met: []? Adjusted   3. Patient will report decreased constipation with increased ease to pass stool with improved consistency and less pain. - met - 2/15/2022   []? Progressing: [x]? Met: []? Not Met: []? Adjusted            4. Report using 3-4 behavioral modification strategies to reduce urinary/bowel complaints through dietary and mechanical changes, with focus on full emptying of bladder with each urination and using optimal positioning and deep breathing for relaxation of posterior PF muscles during defacation, reducing need to strain. - met - 2/15/2022   []? Progressing: [x]? Met: []? Not Met: []? Adjusted            5. Rate severity of the problem related to urinary frequency/urgency and/or pelvic pain/discomfort with passing stool to 3-4/10 or less on scale of 0-10 with 0 being no problem and 10 being the worst - (7/10 reported at intial [facility]). - met - 2/15/2022 - - no longer needing to take medication for bladder control - rates severity of problem on 2/15/22 at 3/10  []? Progressing: [x]? Met: []? Not Met: []? Adjusted            6. Perform HEP for pelvic floor and core muscle groups independently as she progresses to self-manage her pelvic pressure and PF muscle weakness/tightness. - met - 2/15/2022   []? Progressing: [x]? Met: []? Not Met: []? Adjusted                      Plan:      Discontinue PF PT at this time.      Electronically signed by Anny Salmon, PT #[id number] on 2/15/2022 at 10:00 AM      This note also serves as a D/C Summary in the event that this patient is discharged prior to the next therapy session. Please refer to last PT note for goal status, discharge recommendations and functional status.

## 2022-02-15 ENCOUNTER — HOSPITAL ENCOUNTER (OUTPATIENT)
Dept: PHYSICAL THERAPY | Age: 57
Setting detail: THERAPIES SERIES
Discharge: HOME | End: 2022-02-15
Payer: COMMERCIAL

## 2022-02-15 PROCEDURE — 97530 THERAPEUTIC ACTIVITIES: CPT | Performed by: PHYSICAL THERAPIST

## 2022-02-15 PROCEDURE — 97110 THERAPEUTIC EXERCISES: CPT | Performed by: PHYSICAL THERAPIST

## 2022-03-19 PROBLEM — F32.A DEPRESSION: Status: ACTIVE | Noted: 2017-08-11

## 2022-03-19 PROBLEM — F41.1 GAD (GENERALIZED ANXIETY DISORDER): Status: ACTIVE | Noted: 2017-08-11

## 2022-03-19 PROBLEM — E66.9 OBESITY (BMI 35.0-39.9 WITHOUT COMORBIDITY): Status: ACTIVE | Noted: 2017-08-11

## 2022-04-13 ENCOUNTER — E-VISIT (OUTPATIENT)
Dept: PRIMARY CARE CLINIC | Age: 57
End: 2022-04-13
Payer: COMMERCIAL

## 2022-04-13 DIAGNOSIS — J01.10 ACUTE FRONTAL SINUSITIS, RECURRENCE NOT SPECIFIED: Primary | ICD-10-CM

## 2022-04-13 PROCEDURE — 99422 OL DIG E/M SVC 11-20 MIN: CPT | Performed by: INTERNAL MEDICINE

## 2022-04-13 RX ORDER — AZITHROMYCIN 250 MG/1
250 TABLET, FILM COATED ORAL SEE ADMIN INSTRUCTIONS
Qty: 6 TABLET | Refills: 0 | Status: SHIPPED | OUTPATIENT
Start: 2022-04-13 | End: 2022-04-18

## 2022-04-13 RX ORDER — BENZONATATE 100 MG/1
100-200 CAPSULE ORAL 3 TIMES DAILY PRN
Qty: 21 CAPSULE | Refills: 0 | Status: SHIPPED | OUTPATIENT
Start: 2022-04-13 | End: 2022-04-20

## 2022-04-13 ASSESSMENT — LIFESTYLE VARIABLES: SMOKING_STATUS: NO, I'VE NEVER SMOKED

## 2022-04-13 NOTE — PROGRESS NOTES
dose. Too much acetaminophen (Tylenol) can be harmful. · Breathe warm, moist air from a steamy shower, a hot bath, or a sink filled with hot water. Avoid cold, dry air. Using a humidifier in your home may help. Follow the directions for cleaning the machine. · Use saline (saltwater) nasal washes. This can help keep your nasal passages open and wash out mucus and bacteria. You can buy saline nose drops at a grocery store or drugstore. Or you can make your own at home by adding 1 teaspoon of salt and 1 teaspoon of baking soda to 2 cups of distilled water. If you make your own, fill a bulb syringe with the solution, insert the tip into your nostril, and squeeze gently. [place] your nose. · Put a hot, wet towel or a warm gel pack on your face 3 or 4 times a day for 5 to 10 minutes each time. · Try a decongestant nasal spray like oxymetazoline (Afrin). Do not use it for more than 3 days in a row. Using it for more than 3 days can make your congestion worse. When should you call for help? Call your doctor now or seek immediate medical care if:    · You have new or worse swelling or redness in your face or around your eyes. · You have a new or higher fever. Watch closely for changes in your health, and be sure to contact your doctor if:    · You have new or worse facial pain. · The mucus from your nose becomes thicker (like pus) or has new blood in it. · You are not getting better as expected. Where can you learn more? Go to https://[employer]!. org and sign in to your [employer] account. Enter S751 in the [facility] box to learn more about \"Sinusitis: Care Instructions. \"     If you do not have an account, please click on the \"Sign Up Now\" link. Current as of: September 8, 2021               Content Version: 13.1  © 3093-0624 Healthwise, Incorporated. Care instructions adapted under license by [facility] ([facility]).  If you have questions about a medical condition or this instruction, always ask your healthcare professional. [place] [address] any warranty or liability for your use of this information. Patient Education        Saline Nasal Washes: Care Instructions  Your Care Instructions     Saline nasal washes help keep the nasal passages open by washing out thick or dried mucus. This simple remedy can help relieve symptoms of allergies, sinusitis, and colds. It also can make the nose feel more comfortable by keeping the mucous membranes moist. You may notice a little burning sensation in your nose the first few times you use the solution, but this usually gets better in a few days. Follow-up care is a key part of your treatment and safety. Be sure to make and go to all appointments, and call your doctor if you are having problems. It's also a good idea to know your test results and keep a list of the medicines you take. How can you care for yourself at home? · You can buy premixed saline solution in a squeeze bottle or other sinus rinse products at a drugstore. Read and follow the instructions on the label. · You also can make your own saline solution by adding 1 teaspoon of salt and 1 teaspoon of baking soda to 2 cups of distilled water. · If you use a homemade solution, pour a small amount into a clean bowl. Using a rubber bulb syringe, squeeze the syringe and place the tip in the salt water. Pull a small amount of the salt water into the syringe by relaxing your hand. · Sit down with your head tilted slightly back. Do not lie down. Put the tip of the bulb syringe or the squeeze bottle a little way into one of your nostrils. Gently drip or squirt a few drops into the nostril. Repeat with the other nostril. Some sneezing and gagging are normal at first.  · Gently blow your nose. · Wipe the syringe or bottle tip clean after each use. · Repeat this 2 or 3 times a day. · Use nasal washes gently if you have nosebleeds often.   When should you call for help?  Watch closely for changes in your health, and be sure to contact your doctor if:    · You often get nosebleeds. · You have problems doing the nasal washes. Where can you learn more? Go to https://[employer]. org and sign in to your [employer] account. Enter [phone] [id number] [phone] in the [place] box to learn more about \"Saline Nasal Washes: Care Instructions. \"     If you do not have an account, please click on the \"Sign Up Now\" link. Current as of: September 8, 2021               Content Version: 13.1  © 0028-3275 Healthwise, Incorporated. Care instructions adapted under license by [facility] ([facility]). If you have questions about a medical condition or this instruction, always ask your healthcare professional. [address] any warranty or liability for your use of this information.

## 2022-04-20 RX ORDER — PREDNISONE 20 MG/1
20 TABLET ORAL 2 TIMES DAILY
Qty: 10 TABLET | Refills: 0 | Status: SHIPPED | OUTPATIENT
Start: 2022-04-20 | End: 2022-04-25

## 2022-05-29 DIAGNOSIS — R11.0 NAUSEA: Primary | ICD-10-CM

## 2022-05-29 RX ORDER — ONDANSETRON 4 MG/1
4 TABLET, ORALLY DISINTEGRATING ORAL EVERY 8 HOURS PRN
Qty: 10 TABLET | Refills: 0 | Status: SHIPPED | OUTPATIENT
Start: 2022-05-29 | End: 2022-09-21 | Stop reason: SDUPTHER

## 2022-06-03 ENCOUNTER — OFFICE VISIT (OUTPATIENT)
Dept: FAMILY MEDICINE CLINIC | Age: 57
End: 2022-06-03
Payer: COMMERCIAL

## 2022-06-03 VITALS
DIASTOLIC BLOOD PRESSURE: 80 MMHG | HEART RATE: 90 BPM | TEMPERATURE: 97.3 F | OXYGEN SATURATION: 99 % | BODY MASS INDEX: 33.29 KG/M2 | SYSTOLIC BLOOD PRESSURE: 124 MMHG | WEIGHT: 182 LBS

## 2022-06-03 DIAGNOSIS — E66.9 OBESITY (BMI 30-39.9): ICD-10-CM

## 2022-06-03 DIAGNOSIS — J01.90 ACUTE BACTERIAL SINUSITIS: Primary | ICD-10-CM

## 2022-06-03 DIAGNOSIS — K58.1 IRRITABLE BOWEL SYNDROME WITH CONSTIPATION: ICD-10-CM

## 2022-06-03 DIAGNOSIS — B96.89 ACUTE BACTERIAL SINUSITIS: Primary | ICD-10-CM

## 2022-06-03 DIAGNOSIS — I10 ESSENTIAL HYPERTENSION: ICD-10-CM

## 2022-06-03 PROBLEM — E66.01 SEVERE OBESITY WITH BODY MASS INDEX (BMI) OF 35.0 TO 39.9 WITH SERIOUS COMORBIDITY (HCC): Status: RESOLVED | Noted: 2018-06-18 | Resolved: 2022-06-03

## 2022-06-03 PROCEDURE — 99214 OFFICE O/P EST MOD 30 MIN: CPT | Performed by: NURSE PRACTITIONER

## 2022-06-03 RX ORDER — FLUCONAZOLE 150 MG/1
150 TABLET ORAL ONCE
Qty: 1 TABLET | Refills: 0 | Status: SHIPPED | OUTPATIENT
Start: 2022-06-03 | End: 2022-06-03

## 2022-06-03 RX ORDER — AMOXICILLIN AND CLAVULANATE POTASSIUM 875; 125 MG/1; MG/1
1 TABLET, FILM COATED ORAL 2 TIMES DAILY
Qty: 20 TABLET | Refills: 0 | Status: SHIPPED | OUTPATIENT
Start: 2022-06-03 | End: 2022-06-13

## 2022-06-03 ASSESSMENT — PATIENT HEALTH QUESTIONNAIRE - PHQ9
6. FEELING BAD ABOUT YOURSELF - OR THAT YOU ARE A FAILURE OR HAVE LET YOURSELF OR YOUR FAMILY DOWN: 0
5. POOR APPETITE OR OVEREATING: 0
4. FEELING TIRED OR HAVING LITTLE ENERGY: 0
9. THOUGHTS THAT YOU WOULD BE BETTER OFF DEAD, OR OF HURTING YOURSELF: 0
10. IF YOU CHECKED OFF ANY PROBLEMS, HOW DIFFICULT HAVE THESE PROBLEMS MADE IT FOR YOU TO DO YOUR WORK, TAKE CARE OF THINGS AT HOME, OR GET ALONG WITH OTHER PEOPLE: 0
3. TROUBLE FALLING OR STAYING ASLEEP: 0
2. FEELING DOWN, DEPRESSED OR HOPELESS: 0
SUM OF ALL RESPONSES TO PHQ QUESTIONS 1-9: 0
7. TROUBLE CONCENTRATING ON THINGS, SUCH AS READING THE NEWSPAPER OR WATCHING TELEVISION: 0
1. LITTLE INTEREST OR PLEASURE IN DOING THINGS: 0
SUM OF ALL RESPONSES TO PHQ QUESTIONS 1-9: 0
8. MOVING OR SPEAKING SO SLOWLY THAT OTHER PEOPLE COULD HAVE NOTICED. OR THE OPPOSITE, BEING SO FIGETY OR RESTLESS THAT YOU HAVE BEEN MOVING AROUND A LOT MORE THAN USUAL: 0
SUM OF ALL RESPONSES TO PHQ9 QUESTIONS 1 & 2: 0
SUM OF ALL RESPONSES TO PHQ QUESTIONS 1-9: 0
SUM OF ALL RESPONSES TO PHQ QUESTIONS 1-9: 0

## 2022-06-03 ASSESSMENT — ENCOUNTER SYMPTOMS: TROUBLE SWALLOWING: 1

## 2022-06-03 NOTE — PROGRESS NOTES
Bhavya Gonzalez (:  1965) is a 55 y.o. female,Established patient, here for evaluation of the following chief complaint(s):  Nausea & Vomiting (x 1 week ) and Rash (bellly button)      ASSESSMENT/PLAN:  1. Acute bacterial sinusitis  Assessment & Plan:  Suspect the palate mass is an incompletely treated left maxillary sinus infection. Will cover with Augment x10 days and she will follow up with me in office if not completely resolved. Orders:  -     amoxicillin-clavulanate (AUGMENTIN) 875-125 MG per tablet; Take 1 tablet by mouth 2 times daily for 10 days, Disp-20 tablet, R-0Normal  2. Essential hypertension  Assessment & Plan:   Well-controlled, continue current medications   Toprol XL  3. Irritable bowel syndrome with constipation  Assessment & Plan:  Not well controlled  Given Trulance samples and reordered  4. Obesity (BMI 30-39. 9)  Assessment & Plan:  20lb weight loss in last year      No follow-ups on file. SUBJECTIVE/OBJECTIVE:  Yeast smelling rash in umbilicus. Tx with OTZ antifungal cream without relief. Itchy and malodorus. No drainage. Red. Pain in left upper palate with swallowing. Tx 6 weeks ago with Zpack for sinusitis. Required course of steroids for resolution. Cureenty denies sinus pain or tenderness. No drainage. Mild dulling in left ear hearing. No fever but not feeling well.        Current Outpatient Medications   Medication Sig Dispense Refill    fluconazole (DIFLUCAN) 150 MG tablet Take 1 tablet by mouth once for 1 dose 1 tablet 0    Plecanatide 3 MG TABS Take 3 mg by mouth daily 90 tablet 3    amoxicillin-clavulanate (AUGMENTIN) 875-125 MG per tablet Take 1 tablet by mouth 2 times daily for 10 days 20 tablet 0    ondansetron (ZOFRAN-ODT) 4 MG disintegrating tablet Take 1 tablet by mouth every 8 hours as needed for Nausea or Vomiting 10 tablet 0    fluticasone (FLONASE) 50 MCG/ACT nasal spray 1 spray by Each Nostril route daily      metFORMIN (GLUCOPHAGE-XR) 500 MG extended release tablet Take 1 tablet by mouth daily (with breakfast) 90 tablet 2    metoprolol succinate (TOPROL XL) 25 MG extended release tablet TAKE ONE TABLET BY MOUTH DAILY 90 tablet 2    atorvastatin (LIPITOR) 10 MG tablet TAKE ONE TABLET BY MOUTH DAILY 90 tablet 2    levothyroxine (SYNTHROID) 88 MCG tablet TAKE ONE TABLET BY MOUTH DAILY 90 tablet 2    cyclobenzaprine (FLEXERIL) 10 MG tablet Take 1 tablet by mouth daily 90 tablet 2    QUEtiapine (SEROQUEL) 25 MG tablet TAKE ONE TABLET BY MOUTH ONCE NIGHTLY 90 tablet 2     No current facility-administered medications for this visit. Review of Systems   Constitutional:        Overall not feeling well, tired   HENT: Positive for trouble swallowing. All other systems reviewed and are negative. Vitals:    06/03/22 0948   BP: 124/80   Site: Left Upper Arm   Position: Sitting   Cuff Size: Medium Adult   Pulse: 90   Temp: 97.3 °F (36.3 °C)   SpO2: 99%   Weight: 182 lb (82.6 kg)       Physical Exam  Constitutional:       Appearance: She is well-developed. HENT:      Head: Normocephalic. Right Ear: Tympanic membrane normal.      Ears:      Comments: Mild serous effusion     Nose: Nose normal.      Mouth/Throat:      Comments: 1\" erythematous swelling to left post palate. Tender and boggy. Post OP erythematous  Eyes:      Pupils: Pupils are equal, round, and reactive to light. Cardiovascular:      Rate and Rhythm: Normal rate and regular rhythm. Heart sounds: Normal heart sounds. Pulmonary:      Effort: Pulmonary effort is normal.      Breath sounds: Normal breath sounds. Musculoskeletal:         General: Normal range of motion. Cervical back: Normal range of motion. Skin:     General: Skin is warm and dry. Neurological:      Mental Status: She is alert and oriented to person, place, and time. An electronic signature was used to authenticate this note.     --Caridad Marcelino, ALINA - CNP

## 2022-06-03 NOTE — ASSESSMENT & PLAN NOTE
Suspect the palate mass is an incompletely treated left maxillary sinus infection. Will cover with Augment x10 days and she will follow up with me in office if not completely resolved.

## 2022-06-17 RX ORDER — FLUCONAZOLE 150 MG/1
150 TABLET ORAL ONCE
Qty: 1 TABLET | Refills: 0 | Status: SHIPPED | OUTPATIENT
Start: 2022-06-17 | End: 2022-06-17

## 2022-06-18 ENCOUNTER — E-VISIT (OUTPATIENT)
Dept: PRIMARY CARE CLINIC | Age: 57
End: 2022-06-18
Payer: COMMERCIAL

## 2022-06-18 DIAGNOSIS — N76.0 ACUTE VAGINITIS: Primary | ICD-10-CM

## 2022-06-18 PROCEDURE — 99422 OL DIG E/M SVC 11-20 MIN: CPT | Performed by: NURSE PRACTITIONER

## 2022-06-18 RX ORDER — FLUCONAZOLE 150 MG/1
150 TABLET ORAL
Qty: 2 TABLET | Refills: 0 | Status: SHIPPED | OUTPATIENT
Start: 2022-06-18

## 2022-06-18 NOTE — PATIENT INSTRUCTIONS
Patient Education        Vaginitis: Care Instructions  Overview     Vaginitis is soreness or infection of the vagina. This common problem can cause itching and burning. And it can cause a change in vaginal discharge. Sometimes it can cause pain during sex. Vaginitis may be caused by bacteria, yeast, or other germs. Some infections that cause it are caught from a sexual partner. Bath products, spermicides, and douches can irritate the vagina too. This problem can also happen during and after menopause. A drop in estrogenlevels during this time can cause dryness, soreness, and pain during sex. Your doctor can give you medicine to treat vaginitis. And home care may help you feel better. For certain types of infections, your sex partner(s) must betreated too. Follow-up care is a key part of your treatment and safety. Be sure to make and go to all appointments, and call your doctor if you are having problems. It's also a good idea to know your test results and keep alist of the medicines you take. How can you care for yourself at home?  Take your medicines exactly as prescribed. Call your doctor if you think you are having a problem with your medicine.  Ask your doctor if your sex partner(s) also needs treatment.  Do not eat or drink anything that has alcohol if you are taking metronidazole (Flagyl).  Wash your vulva daily with water. You also can use a mild, unscented soap if you want.  Do not use scented bath products. And do not use vaginal sprays or douches.  Change out of wet or damp clothes as soon as possible. Wear cotton underwear. And avoid tight clothing that could increase moisture.  Put a washcloth soaked in cool water on the area to relieve itching. Or you can take cool baths.  If you have dryness because of menopause, use estrogen cream or pills that your doctor prescribes.  Ask your doctor about when it is okay to have sex.  Use a personal lubricant before sex if you have dryness. Examples are Astroglide, K-Y Jelly, and Wet Lubricant Gel. When should you call for help? Call your doctor now or seek immediate medical care if:     You have a fever.      You have new or increased pain in your vagina or pelvis.      You have new or worse vaginal itching or discharge. Watch closely for changes in your health, and be sure to contact your doctor if:     You have bleeding other than your period.      You do not get better as expected. Where can you learn more? Go to https://edPULSEpeArrive Technologieseb.[employer]. org and sign in to your [employer] account. Enter U765 in the [employer] box to learn more about \"Vaginitis: Care Instructions. \"     If you do not have an account, please click on the \"Sign Up Now\" link. Current as of: November 22, 2021               Content Version: 13.2  © 3191-3692 Healthwise, Incorporated. Care instructions adapted under license by [facility] ([facility]). If you have questions about a medical condition or this instruction, always ask your healthcare professional. [address] any warranty or liability for your use of this information.

## 2022-06-18 NOTE — PROGRESS NOTES
Reviewed questionnaire  Reviewed previous encounters, meds, allergies and history    Dx vaginitis    Plan  -rx for diflucan 150 mg po every 72 hours. May repeat in 3 days if symptoms do not improve     I'm sorry to hear that you haven't been feeling well. I will sent in diflucan for what sounds like a yeast infection. If your symptoms worsen or fail to improve please see you PCP or OBGYN. Diflucan can be taken once or may be repeated in 3 days if your symptoms are not fully resolved. I have included information below to help with your symptoms at home. How you can care for yourself at home:     -Wear loose cotton clothing. Do not wear nylon or other fabric that holds body heat and moisture close to the skin. -Try sleeping without underwear.  -Do not scratch. Relieve itching with a cold pack or a cool bath. -Do not wash your vaginal area more than once a day. Use plain water or a mild, unscented soap. Air-dry the vaginal area. -Change out of wet swimsuits after swimming.  -Do not have sex until you have finished your treatment.  -Do not douche. Call your doctor if:      -You have unexpected vaginal bleeding.   -You have new or increased pain in your vagina or pelvis.   -You have a fever.   -You are not getting better after 3 days.   -Your symptoms come back after you finish your medicines. Hope you feel better soon.      Time spent 11 min

## 2022-08-03 DIAGNOSIS — R73.09 ELEVATED GLUCOSE: ICD-10-CM

## 2022-08-03 DIAGNOSIS — E66.9 OBESITY (BMI 30-39.9): Primary | ICD-10-CM

## 2022-08-03 DIAGNOSIS — I10 ESSENTIAL HYPERTENSION: Primary | ICD-10-CM

## 2022-08-03 DIAGNOSIS — Z00.00 ANNUAL PHYSICAL EXAM: ICD-10-CM

## 2022-08-03 DIAGNOSIS — E03.9 HYPOTHYROIDISM, UNSPECIFIED TYPE: ICD-10-CM

## 2022-08-09 DIAGNOSIS — G47.00 INSOMNIA, UNSPECIFIED TYPE: ICD-10-CM

## 2022-08-09 RX ORDER — QUETIAPINE FUMARATE 25 MG/1
TABLET, FILM COATED ORAL
Qty: 90 TABLET | Refills: 2 | OUTPATIENT
Start: 2022-08-09

## 2022-08-09 RX ORDER — QUETIAPINE FUMARATE 25 MG/1
TABLET, FILM COATED ORAL
Qty: 90 TABLET | Refills: 2 | Status: SHIPPED | OUTPATIENT
Start: 2022-08-09 | End: 2022-10-20

## 2022-08-16 DIAGNOSIS — E03.9 HYPOTHYROIDISM, UNSPECIFIED TYPE: ICD-10-CM

## 2022-08-16 DIAGNOSIS — R73.09 ELEVATED GLUCOSE: ICD-10-CM

## 2022-08-16 DIAGNOSIS — E66.9 OBESITY (BMI 30-39.9): ICD-10-CM

## 2022-08-16 DIAGNOSIS — Z00.00 ANNUAL PHYSICAL EXAM: ICD-10-CM

## 2022-08-16 LAB
A/G RATIO: 1.5 (ref 1.1–2.2)
ALBUMIN SERPL-MCNC: 4.1 G/DL (ref 3.4–5)
ALP BLD-CCNC: 84 U/L (ref 40–129)
ALT SERPL-CCNC: 14 U/L (ref 10–40)
ANION GAP SERPL CALCULATED.3IONS-SCNC: 14 MMOL/L (ref 3–16)
AST SERPL-CCNC: 15 U/L (ref 15–37)
BASOPHILS ABSOLUTE: 0.1 K/UL (ref 0–0.2)
BASOPHILS RELATIVE PERCENT: 0.8 %
BILIRUB SERPL-MCNC: 0.3 MG/DL (ref 0–1)
BUN BLDV-MCNC: 18 MG/DL (ref 7–20)
CALCIUM SERPL-MCNC: 10 MG/DL (ref 8.3–10.6)
CHLORIDE BLD-SCNC: 102 MMOL/L (ref 99–110)
CHOLESTEROL, TOTAL: 175 MG/DL (ref 0–199)
CO2: 24 MMOL/L (ref 21–32)
CREAT SERPL-MCNC: 0.8 MG/DL (ref 0.6–1.1)
EOSINOPHILS ABSOLUTE: 0.2 K/UL (ref 0–0.6)
EOSINOPHILS RELATIVE PERCENT: 3.2 %
GFR AFRICAN AMERICAN: >60
GFR NON-AFRICAN AMERICAN: >60
GLUCOSE BLD-MCNC: 104 MG/DL (ref 70–99)
HCT VFR BLD CALC: 40.5 % (ref 36–48)
HDLC SERPL-MCNC: 48 MG/DL (ref 40–60)
HEMOGLOBIN: 13.6 G/DL (ref 12–16)
LDL CHOLESTEROL CALCULATED: 97 MG/DL
LYMPHOCYTES ABSOLUTE: 2.7 K/UL (ref 1–5.1)
LYMPHOCYTES RELATIVE PERCENT: 40.6 %
MCH RBC QN AUTO: 29.6 PG (ref 26–34)
MCHC RBC AUTO-ENTMCNC: 33.6 G/DL (ref 31–36)
MCV RBC AUTO: 88 FL (ref 80–100)
MONOCYTES ABSOLUTE: 0.3 K/UL (ref 0–1.3)
MONOCYTES RELATIVE PERCENT: 4.9 %
NEUTROPHILS ABSOLUTE: 3.4 K/UL (ref 1.7–7.7)
NEUTROPHILS RELATIVE PERCENT: 50.5 %
PDW BLD-RTO: 13.4 % (ref 12.4–15.4)
PLATELET # BLD: 256 K/UL (ref 135–450)
PMV BLD AUTO: 8.8 FL (ref 5–10.5)
POTASSIUM SERPL-SCNC: 4.1 MMOL/L (ref 3.5–5.1)
RBC # BLD: 4.61 M/UL (ref 4–5.2)
SODIUM BLD-SCNC: 140 MMOL/L (ref 136–145)
TOTAL PROTEIN: 6.8 G/DL (ref 6.4–8.2)
TRIGL SERPL-MCNC: 151 MG/DL (ref 0–150)
TSH REFLEX FT4: 2.2 UIU/ML (ref 0.27–4.2)
VITAMIN D 25-HYDROXY: 65.3 NG/ML
VLDLC SERPL CALC-MCNC: 30 MG/DL
WBC # BLD: 6.7 K/UL (ref 4–11)

## 2022-08-17 LAB
ESTIMATED AVERAGE GLUCOSE: 122.6 MG/DL
HBA1C MFR BLD: 5.9 %

## 2022-08-22 ENCOUNTER — OFFICE VISIT (OUTPATIENT)
Dept: FAMILY MEDICINE CLINIC | Age: 57
End: 2022-08-22
Payer: COMMERCIAL

## 2022-08-22 VITALS
WEIGHT: 191 LBS | TEMPERATURE: 97.1 F | BODY MASS INDEX: 35.15 KG/M2 | HEIGHT: 62 IN | HEART RATE: 84 BPM | DIASTOLIC BLOOD PRESSURE: 72 MMHG | OXYGEN SATURATION: 98 % | SYSTOLIC BLOOD PRESSURE: 122 MMHG

## 2022-08-22 DIAGNOSIS — R73.09 ELEVATED GLUCOSE: ICD-10-CM

## 2022-08-22 DIAGNOSIS — R25.2 MUSCLE CRAMPS: ICD-10-CM

## 2022-08-22 DIAGNOSIS — E03.9 HYPOTHYROIDISM, UNSPECIFIED TYPE: ICD-10-CM

## 2022-08-22 DIAGNOSIS — Z23 NEED FOR PROPHYLACTIC VACCINATION AND INOCULATION AGAINST VARICELLA: ICD-10-CM

## 2022-08-22 DIAGNOSIS — Z00.00 ENCOUNTER FOR WELL ADULT EXAM WITHOUT ABNORMAL FINDINGS: Primary | ICD-10-CM

## 2022-08-22 DIAGNOSIS — R00.0 TACHYCARDIA: ICD-10-CM

## 2022-08-22 PROCEDURE — 90750 HZV VACC RECOMBINANT IM: CPT | Performed by: NURSE PRACTITIONER

## 2022-08-22 PROCEDURE — 90471 IMMUNIZATION ADMIN: CPT | Performed by: NURSE PRACTITIONER

## 2022-08-22 PROCEDURE — 99396 PREV VISIT EST AGE 40-64: CPT | Performed by: NURSE PRACTITIONER

## 2022-08-22 RX ORDER — CYCLOBENZAPRINE HCL 10 MG
10 TABLET ORAL DAILY
Qty: 90 TABLET | Refills: 2 | Status: SHIPPED | OUTPATIENT
Start: 2022-08-22 | End: 2022-10-20

## 2022-08-22 RX ORDER — METOPROLOL SUCCINATE 25 MG/1
TABLET, EXTENDED RELEASE ORAL
Qty: 90 TABLET | Refills: 2 | Status: SHIPPED | OUTPATIENT
Start: 2022-08-22

## 2022-08-22 RX ORDER — LEVOTHYROXINE SODIUM 88 UG/1
TABLET ORAL
Qty: 90 TABLET | Refills: 2 | Status: SHIPPED | OUTPATIENT
Start: 2022-08-22 | End: 2022-11-04

## 2022-08-22 RX ORDER — ATORVASTATIN CALCIUM 10 MG/1
TABLET, FILM COATED ORAL
Qty: 90 TABLET | Refills: 2 | Status: SHIPPED | OUTPATIENT
Start: 2022-08-22 | End: 2022-11-04

## 2022-08-22 RX ORDER — METFORMIN HYDROCHLORIDE 500 MG/1
500 TABLET, EXTENDED RELEASE ORAL
Qty: 90 TABLET | Refills: 2 | Status: SHIPPED | OUTPATIENT
Start: 2022-08-22 | End: 2023-02-18

## 2022-08-22 SDOH — ECONOMIC STABILITY: FOOD INSECURITY: WITHIN THE PAST 12 MONTHS, YOU WORRIED THAT YOUR FOOD WOULD RUN OUT BEFORE YOU GOT MONEY TO BUY MORE.: NEVER TRUE

## 2022-08-22 SDOH — ECONOMIC STABILITY: FOOD INSECURITY: WITHIN THE PAST 12 MONTHS, THE FOOD YOU BOUGHT JUST DIDN'T LAST AND YOU DIDN'T HAVE MONEY TO GET MORE.: NEVER TRUE

## 2022-08-22 ASSESSMENT — ENCOUNTER SYMPTOMS
NAUSEA: 0
ABDOMINAL DISTENTION: 0
DIARRHEA: 0
ABDOMINAL PAIN: 0
EYE DISCHARGE: 0
CHEST TIGHTNESS: 0
SINUS PRESSURE: 0
WHEEZING: 0
CONSTIPATION: 0
STRIDOR: 0
TROUBLE SWALLOWING: 0
SINUS PAIN: 0
EYE PAIN: 0
EYE ITCHING: 0
EYE REDNESS: 0
RHINORRHEA: 0
BACK PAIN: 0
VOMITING: 0
SHORTNESS OF BREATH: 0
COUGH: 0

## 2022-08-22 ASSESSMENT — SOCIAL DETERMINANTS OF HEALTH (SDOH): HOW HARD IS IT FOR YOU TO PAY FOR THE VERY BASICS LIKE FOOD, HOUSING, MEDICAL CARE, AND HEATING?: NOT HARD AT ALL

## 2022-08-22 NOTE — PATIENT INSTRUCTIONS
Well Visit, Women 48 to 72: Care Instructions  Overview     Well visits can help you stay healthy. Your doctor has checked your overall health and may have suggested ways to take good care of yourself. Your doctor also may have recommended tests. At home, you can help prevent illness withhealthy eating, regular exercise, and other steps. Follow-up care is a key part of your treatment and safety. Be sure to make and go to all appointments, and call your doctor if you are having problems. It's also a good idea to know your test results and keep alist of the medicines you take. How can you care for yourself at home? Get screening tests that you and your doctor decide on. Screening helps find diseases before any symptoms appear. Eat healthy foods. Choose fruits, vegetables, whole grains, protein, and low-fat dairy foods. Limit fat, especially saturated fat. Reduce salt in your diet. Limit alcohol. Have no more than 1 drink a day or 7 drinks a week. Get at least 30 minutes of exercise on most days of the week. Walking is a good choice. You also may want to do other activities, such as running, swimming, cycling, or playing tennis or team sports. Reach and stay at a healthy weight. This will lower your risk for many problems, such as obesity, diabetes, heart disease, and high blood pressure. Do not smoke. Smoking can make health problems worse. If you need help quitting, talk to your doctor about stop-smoking programs and medicines. These can increase your chances of quitting for good. Care for your mental health. It is easy to get weighed down by worry and stress. Learn strategies to manage stress, like deep breathing and mindfulness, and stay connected with your family and community. If you find you often feel sad or hopeless, talk with your doctor. Treatment can help. Talk to your doctor about whether you have any risk factors for sexually transmitted infections (STIs).  You can help prevent STIs if you wait to have sex with a new partner (or partners) until you've each been tested for STIs. It also helps if you use condoms (male or female condoms) and if you limit your sex partners to one person who only has sex with you. Vaccines are available for some STIs. If you think you may have a problem with alcohol or drug use, talk to your doctor. This includes prescription medicines (such as amphetamines and opioids) and illegal drugs (such as cocaine and methamphetamine). Your doctor can help you figure out what type of treatment is best for you. Protect your skin from too much sun. When you're outdoors from 10 a.m. to 4 p.m., stay in the shade or cover up with clothing and a hat with a wide brim. Wear sunglasses that block UV rays. Even when it's cloudy, put broad-spectrum sunscreen (SPF 30 or higher) on any exposed skin. See a dentist one or two times a year for checkups and to have your teeth cleaned. Wear a seat belt in the car. When should you call for help? Watch closely for changes in your health, and be sure to contact your doctor if you have any problems or symptoms that concern you. Where can you learn more? Go to https://RegaalopePeaberry Softwareeb.[facility]. org and sign in to your [employer] account. Enter M357 in the [facility] box to learn more about \"Well Visit, Women 50 to 72: Care Instructions. \"     If you do not have an account, please click on the \"Sign Up Now\" link. Current as of: October 6, 2021               Content Version: 13.3  © 2006-2022 Healthwise, Incorporated. Care instructions adapted under license by [facility] ([facility]). If you have questions about a medical condition or this instruction, always ask your healthcare professional. [place] [address] any warranty or liability for your use of this information.

## 2022-08-22 NOTE — PROGRESS NOTES
Subjective:      Patient ID: Chayo Vivar is a 65 y.o. y.o. female. HPI    Chayo Vivar is here for a physical.  Review of labs indicate elevated A1c fasting glucose. Patient is interested in adding a second medication for diabetic control. She is working on her diet as well. Chief Complaint   Patient presents with    Annual Exam     Rx refills, go over labs [facility]       Vitals:    08/22/22 1011   BP: 122/72   Pulse: 84   Temp: 97.1 °F (36.2 °C)   SpO2: 98%       Wt Readings from Last 3 Encounters:   08/22/22 191 lb (86.6 kg)   06/03/22 182 lb (82.6 kg)   09/28/21 189 lb (85.7 kg)       Past Medical History:   Diagnosis Date    Back pain     Depression     Fibromyalgia     Hypertension     Hypertension, essential     Hypothyroidism     Hypothyroidism     IBS (irritable bowel syndrome)     with constipation    Insomnia     Mild reactive airways disease     Muscle cramps     Obesity (BMI 30-39. 9)     Tachycardia        Past Surgical History:   Procedure Laterality Date    DILATION AND CURETTAGE OF UTERUS      HYSTERECTOMY, TOTAL ABDOMINAL (CERVIX REMOVED)      SINUS SURGERY         Current Outpatient Medications   Medication Sig Dispense Refill    Semaglutide,0.25 or 0.5MG/DOS, 2 MG/1.5ML SOPN Inject 0.25 mg into the skin once a week 4 pen 3    metFORMIN (GLUCOPHAGE-XR) 500 MG extended release tablet Take 1 tablet by mouth daily (with breakfast) 90 tablet 2    levothyroxine (SYNTHROID) 88 MCG tablet TAKE ONE TABLET BY MOUTH DAILY 90 tablet 2    atorvastatin (LIPITOR) 10 MG tablet TAKE ONE TABLET BY MOUTH DAILY 90 tablet 2    metoprolol succinate (TOPROL XL) 25 MG extended release tablet TAKE ONE TABLET BY MOUTH DAILY 90 tablet 2    cyclobenzaprine (FLEXERIL) 10 MG tablet Take 1 tablet by mouth daily 90 tablet 2    QUEtiapine (SEROQUEL) 25 MG tablet TAKE ONE TABLET BY MOUTH NIGHTLY 90 tablet 2    Plecanatide 3 MG TABS Take 3 mg by mouth daily 90 tablet 3    ondansetron (ZOFRAN-ODT) 4 MG disintegrating tablet Take 1 tablet by mouth every 8 hours as needed for Nausea or Vomiting 10 tablet 0    fluticasone (FLONASE) 50 MCG/ACT nasal spray 1 spray by Each Nostril route daily      fluconazole (DIFLUCAN) 150 MG tablet Take 1 tablet by mouth every 72 hours (Patient not taking: Reported on 8/22/2022) 2 tablet 0     No current facility-administered medications for this visit. Family History   Problem Relation Age of Onset    Cancer Mother     Dementia Mother         breast cancer    Breast Cancer Mother     Arthritis Mother     Lung Cancer Mother     Other Father         MVA    Alcohol Abuse Father     Alzheimer's Disease Maternal Grandmother     Heart Failure Maternal Grandfather     Hypertension Maternal Grandfather     Breast Cancer Maternal Cousin     Breast Cancer Paternal Cousin        Social History     Tobacco Use    Smoking status: Never    Smokeless tobacco: Never   Vaping Use    Vaping Use: Never used   Substance Use Topics    Alcohol use:  Yes     Alcohol/week: 1.0 standard drink     Types: 1 Standard drinks or equivalent per week     Comment: rarely    Drug use: Never       Immunization History   Administered Date(s) Administered    COVID-19, MODERNA BLUE border, Primary or Immunocompromised, (age 12y+), IM, 100 mcg/0.5mL 12/28/2020, 01/25/2021    COVID-19, PFIZER PURPLE top, DILUTE for use, (age 15 y+), 30mcg/0.3mL 12/02/2021    Hepatitis A Adult (Havrix, Vaqta) 04/27/2018    Influenza Virus Vaccine 12/07/2011, 10/01/2017, 11/18/2021    Influenza, [facility] (age 1 y+), MDV 10/31/2019, 11/24/2020    PPD Test 01/04/2019    Tdap (Boostrix, Adacel) 02/27/2015, 09/11/2017    Zoster Recombinant (Shingrix) 07/06/2021, 08/22/2022       Health Maintenance   Topic Date Due    COVID-19 Vaccine (4 - Booster for Moderna series) 04/02/2022    DTaP/Tdap/Td vaccine (3 - Td or Tdap) 09/20/2027 (Originally 9/11/2027)    Flu vaccine (1) 09/01/2022    Breast cancer screen  09/28/2022    Depression Monitoring 06/03/2023    A1C test (Diabetic or Prediabetic)  08/16/2023    Lipids  08/16/2023    Colorectal Cancer Screen  02/24/2026    Shingles vaccine  Completed    Hepatitis A vaccine  Aged Out    Hepatitis B vaccine  Aged Out    Hib vaccine  Aged Out    Meningococcal (ACWY) vaccine  Aged Out    Pneumococcal 0-64 years Vaccine  Aged Out    Hepatitis C screen  Discontinued    HIV screen  Discontinued         Review of Systems   Constitutional:  Negative for chills, fatigue and fever. HENT:  Negative for congestion, ear pain, hearing loss, rhinorrhea, sinus pressure, sinus pain, tinnitus and trouble swallowing. Eyes:  Negative for pain, discharge, redness and itching. Respiratory:  Negative for cough, chest tightness, shortness of breath, wheezing and stridor. Cardiovascular:  Negative for chest pain, palpitations and leg swelling. Gastrointestinal:  Negative for abdominal distention, abdominal pain, constipation, diarrhea, nausea and vomiting. Genitourinary:  Negative for difficulty urinating, dysuria, hematuria and urgency. Musculoskeletal:  Negative for back pain, joint swelling, myalgias and neck pain. Skin:  Negative for rash and wound. Allergic/Immunologic: Positive for environmental allergies. Neurological:  Negative for dizziness and headaches. Objective:   Physical Exam  Vitals reviewed. Constitutional:       General: She is not in acute distress. Appearance: She is well-developed. She is not ill-appearing, toxic-appearing or diaphoretic. HENT:      Head: Normocephalic and atraumatic. Right Ear: Tympanic membrane and external ear normal.      Left Ear: Tympanic membrane and external ear normal.      Nose: Nose normal.      Mouth/Throat:      Mouth: Mucous membranes are moist.   Eyes:      General: No scleral icterus. Right eye: No discharge. Left eye: No discharge. Extraocular Movements: Extraocular movements intact.       Conjunctiva/sclera: Conjunctivae normal.      Pupils: Pupils are equal, round, and reactive to light. Neck:      Thyroid: No thyromegaly. Cardiovascular:      Rate and Rhythm: Normal rate and regular rhythm. Heart sounds: Normal heart sounds. No murmur heard. No friction rub. No gallop. Pulmonary:      Effort: Pulmonary effort is normal. No respiratory distress. Breath sounds: Normal breath sounds. No stridor. No wheezing or rales. Chest:      Chest wall: No tenderness. Abdominal:      General: Bowel sounds are normal. There is no distension. Palpations: Abdomen is soft. There is no mass. Tenderness: There is no abdominal tenderness. There is no guarding or rebound. Hernia: No hernia is present. Musculoskeletal:         General: No tenderness or deformity. Normal range of motion. Cervical back: Normal range of motion and neck supple. Lymphadenopathy:      Cervical: No cervical adenopathy. Skin:     General: Skin is warm and dry. Capillary Refill: Capillary refill takes less than 2 seconds. Coloration: Skin is not pale. Findings: No erythema or rash. Neurological:      Mental Status: She is alert and oriented to person, place, and time. Cranial Nerves: No cranial nerve deficit. Motor: No abnormal muscle tone. Coordination: Coordination normal.   Psychiatric:         Behavior: Behavior normal.         Thought Content:  Thought content normal.         Judgment: Judgment normal.       Assessment:      See ProblemList assessment and plan      Orders Only on 08/16/2022   Component Date Value Ref Range Status    WBC 08/16/2022 6.7  4.0 - 11.0 K/uL Final    RBC 08/16/2022 4.61  4.00 - 5.20 M/uL Final    Hemoglobin 08/16/2022 13.6  12.0 - 16.0 g/dL Final    Hematocrit 08/16/2022 40.5  36.0 - 48.0 % Final    MCV 08/16/2022 88.0  80.0 - 100.0 fL Final    MCH 08/16/2022 29.6  26.0 - 34.0 pg Final    MCHC 08/16/2022 33.6  31.0 - 36.0 g/dL Final    RDW 08/16/2022 13.4  12.4 - 15.4 % Final    Platelets 89/44/3218 256  135 - 450 K/uL Final    MPV 08/16/2022 8.8  5.0 - 10.5 fL Final    Neutrophils % 08/16/2022 50.5  % Final    Lymphocytes % 08/16/2022 40.6  % Final    Monocytes % 08/16/2022 4.9  % Final    Eosinophils % 08/16/2022 3.2  % Final    Basophils % 08/16/2022 0.8  % Final    Neutrophils Absolute 08/16/2022 3.4  1.7 - 7.7 K/uL Final    Lymphocytes Absolute 08/16/2022 2.7  1.0 - 5.1 K/uL Final    Monocytes Absolute 08/16/2022 0.3  0.0 - 1.3 K/uL Final    Eosinophils Absolute 08/16/2022 0.2  0.0 - 0.6 K/uL Final    Basophils Absolute 08/16/2022 0.1  0.0 - 0.2 K/uL Final    Sodium 08/16/2022 140  136 - 145 mmol/L Final    Potassium 08/16/2022 4.1  3.5 - 5.1 mmol/L Final    Chloride 08/16/2022 102  99 - 110 mmol/L Final    CO2 08/16/2022 24  21 - 32 mmol/L Final    Anion Gap 08/16/2022 14  3 - 16 Final    Glucose 08/16/2022 104 (A) 70 - 99 mg/dL Final    BUN 08/16/2022 18  7 - 20 mg/dL Final    Creatinine 08/16/2022 0.8  0.6 - 1.1 mg/dL Final    GFR Non- 08/16/2022 >60  >60 Final    GFR  08/16/2022 >60  >60 Final    Calcium 08/16/2022 10.0  8.3 - 10.6 mg/dL Final    Total Protein 08/16/2022 6.8  6.4 - 8.2 g/dL Final    Albumin 08/16/2022 4.1  3.4 - 5.0 g/dL Final    Albumin/Globulin Ratio 08/16/2022 1.5  1.1 - 2.2 Final    Total Bilirubin 08/16/2022 0.3  0.0 - 1.0 mg/dL Final    Alkaline Phosphatase 08/16/2022 84  40 - 129 U/L Final    ALT 08/16/2022 14  10 - 40 U/L Final    AST 08/16/2022 15  15 - 37 U/L Final    Hemoglobin A1C 08/16/2022 5.9  See comment % Final    eAG 08/16/2022 122.6  mg/dL Final    TSH Reflex FT4 08/16/2022 2.20  0.27 - 4.20 uIU/mL Final    Vit D, 25-Hydroxy 08/16/2022 65.3  >=30 ng/mL Final    Cholesterol, Total 08/16/2022 175  0 - 199 mg/dL Final    Triglycerides 08/16/2022 151 (A) 0 - 150 mg/dL Final    HDL 08/16/2022 48  40 - 60 mg/dL Final    LDL Calculated 08/16/2022 97  <100 mg/dL Final    VLDL Cholesterol Calculated 08/16/2022 30 Not Established mg/dL Final        Plan:      Diagnosis Orders   1. Encounter for well adult exam without abnormal findings        2. Elevated glucose  metFORMIN (GLUCOPHAGE-XR) 500 MG extended release tablet      3. Hypothyroidism, unspecified type  levothyroxine (SYNTHROID) 88 MCG tablet      4. Tachycardia  metoprolol succinate (TOPROL XL) 25 MG extended release tablet      5. Muscle cramps  cyclobenzaprine (FLEXERIL) 10 MG tablet      6. Need for prophylactic vaccination and inoculation against varicella  Zoster Subunit [facility])        No problem-specific Assessment & Plan notes found for this encounter. Labs reviewed. Given samples of Trulicity. Discussed the use of GLP-1. Avoid fats, stay well-hydrated. Will order Ozempic as it appears this for her insurance coverage. Follow-up in 3 months for A1c check. Discussed over the counter medication with patient. Yaquelin Lucas received counseling on the following healthy behaviors: nutrition, exercise, and medication adherence      Discussed use, benefit, and side effects of prescribed medications. Barriers to medication compliance addressed. Allpatient questions answered. Pt voiced understanding. Medications reviewed and patient understands. Questions answered  Patient engaged in shared decision making. Information given to evaluate options of treatment, understand what is needed and discuss importance of following plan.

## 2022-08-23 ENCOUNTER — TELEPHONE (OUTPATIENT)
Dept: ADMINISTRATIVE | Age: 57
End: 2022-08-23

## 2022-08-23 NOTE — TELEPHONE ENCOUNTER
Submitted PA for Ozempic (0.25 or 0.5 MG/DOSE) 2MG/1.5ML pen-injectors, Key: [id number]. Status PENDING.

## 2022-09-15 ENCOUNTER — TELEMEDICINE (OUTPATIENT)
Dept: FAMILY MEDICINE CLINIC | Age: 57
End: 2022-09-15
Payer: COMMERCIAL

## 2022-09-15 DIAGNOSIS — F43.23 SITUATIONAL MIXED ANXIETY AND DEPRESSIVE DISORDER: Primary | ICD-10-CM

## 2022-09-15 PROCEDURE — 99213 OFFICE O/P EST LOW 20 MIN: CPT | Performed by: NURSE PRACTITIONER

## 2022-09-15 RX ORDER — BUSPIRONE HYDROCHLORIDE 5 MG/1
5 TABLET ORAL 3 TIMES DAILY
Qty: 90 TABLET | Refills: 0 | Status: SHIPPED | OUTPATIENT
Start: 2022-09-15 | End: 2022-10-15

## 2022-09-15 RX ORDER — LORAZEPAM 0.5 MG/1
0.5 TABLET ORAL 3 TIMES DAILY PRN
Qty: 30 TABLET | Refills: 0 | Status: SHIPPED | OUTPATIENT
Start: 2022-09-15 | End: 2022-10-15

## 2022-09-15 NOTE — PROGRESS NOTES
Nausea or Vomiting  Jl Grove, APRN - CNP   fluticasone (FLONASE) 50 MCG/ACT nasal spray 1 spray by Each Nostril route daily  Historical Provider, MD       Social History     Tobacco Use    Smoking status: Never    Smokeless tobacco: Never   Vaping Use    Vaping Use: Never used   Substance Use Topics    Alcohol use: Yes     Alcohol/week: 1.0 standard drink     Types: 1 Standard drinks or equivalent per week     Comment: rarely    Drug use: Never        Past Medical History:   Diagnosis Date    Back pain     Depression     Fibromyalgia     Hypertension     Hypertension, essential     Hypothyroidism     Hypothyroidism     IBS (irritable bowel syndrome)     with constipation    Insomnia     Mild reactive airways disease     Muscle cramps     Obesity (BMI 30-39. 9)     Tachycardia        Past Surgical History:   Procedure Laterality Date    DILATION AND CURETTAGE OF UTERUS      HYSTERECTOMY, TOTAL ABDOMINAL (CERVIX REMOVED)      SINUS SURGERY         PHYSICAL EXAMINATION:  [ INSTRUCTIONS:  \"[x]\" Indicates a positive item  \"[]\" Indicates a negative item  -- DELETE ALL ITEMS NOT EXAMINED]  Vital Signs: (As obtained by patient/caregiver or practitioner observation)    Blood pressure-  Heart rate-    Respiratory rate-    Temperature-  Pulse oximetry-     Constitutional: [x] Appears well-developed and well-nourished [x] No apparent distress      [] Abnormal-   Mental status  [x] Alert and awake  [x] Oriented to person/place/time [x]Able to follow commands      Eyes:  EOM    [x]  Normal  [] Abnormal-  Sclera  [x]  Normal  [] Abnormal -         Discharge [x]  None visible  [] Abnormal -    HENT:   [x] Normocephalic, atraumatic.   [] Abnormal   [] Mouth/Throat: Mucous membranes are moist.     External Ears [] Normal  [] Abnormal-     Neck: [x] No visualized mass     Pulmonary/Chest: [x] Respiratory effort normal.  [x] No visualized signs of difficulty breathing or respiratory distress        [] Abnormal- this encounter: not billed by time    Services were provided through a video synchronous discussion virtually to substitute for in-person clinic visit. Patient and provider were located at their individual homes. --Read ALINA Schmidt CNP on 9/15/2022 at 1:35 PM    An electronic signature was used to authenticate this note.

## 2022-09-21 DIAGNOSIS — R11.0 NAUSEA: ICD-10-CM

## 2022-09-21 RX ORDER — ONDANSETRON 4 MG/1
4 TABLET, ORALLY DISINTEGRATING ORAL EVERY 8 HOURS PRN
Qty: 10 TABLET | Refills: 0 | Status: SHIPPED | OUTPATIENT
Start: 2022-09-21

## 2022-10-20 DIAGNOSIS — R25.2 MUSCLE CRAMPS: ICD-10-CM

## 2022-10-20 DIAGNOSIS — G47.00 INSOMNIA, UNSPECIFIED TYPE: ICD-10-CM

## 2022-10-20 RX ORDER — CYCLOBENZAPRINE HCL 10 MG
TABLET ORAL
Qty: 90 TABLET | Refills: 2 | Status: SHIPPED | OUTPATIENT
Start: 2022-10-20

## 2022-10-20 RX ORDER — QUETIAPINE FUMARATE 25 MG/1
TABLET, FILM COATED ORAL
Qty: 90 TABLET | Refills: 2 | Status: SHIPPED | OUTPATIENT
Start: 2022-10-20

## 2022-10-24 ENCOUNTER — PATIENT MESSAGE (OUTPATIENT)
Dept: FAMILY MEDICINE CLINIC | Age: 57
End: 2022-10-24

## 2022-10-24 DIAGNOSIS — M79.606 PAIN OF LOWER EXTREMITY, UNSPECIFIED LATERALITY: Primary | ICD-10-CM

## 2022-11-01 ENCOUNTER — HOSPITAL ENCOUNTER (OUTPATIENT)
Dept: PHYSICAL THERAPY | Age: 57
Setting detail: THERAPIES SERIES
Discharge: HOME | End: 2022-11-01
Payer: COMMERCIAL

## 2022-11-01 PROCEDURE — 97112 NEUROMUSCULAR REEDUCATION: CPT

## 2022-11-01 PROCEDURE — 97161 PT EVAL LOW COMPLEX 20 MIN: CPT

## 2022-11-01 PROCEDURE — 97530 THERAPEUTIC ACTIVITIES: CPT

## 2022-11-01 NOTE — PLAN OF CARE
[place] [facility], [facility] [address] [address]  Phone: [phone]   Fax:     [phone]                                                       Physical Therapy Certification    Dear  Ignacia Babcock, APRN-CNP,    We had the pleasure of evaluating the following patient for physical therapy services at [facility]. A summary of our findings can be found in the initial assessment below. This includes our plan of care. If you have any questions or concerns regarding these findings, please do not hesitate to contact me at the office phone number checked above. Thank you for the referral.       Physician Signature:_______________________________Date:__________________  By signing above (or electronic signature), therapists plan is approved by physician        Patient: Les De La Cruz   : 1965   MRN: [id number]  Referring Physician:        Evaluation Date: 2022      Medical Diagnosis Information:  Diagnosis: M79.606 Pain of LE, unspecified laterality   Treatment Diagnosis: R LE Pain M54.1                                         Insurance information: PT Insurance Information: [employer] Employee, 10% coinsurance, allowed 30 visits per calendar year, does not require prior authorization, has used 2 visits to date     Precautions/ Contra-indications:   Latex Allergy:  [x]NO      []YES  Preferred Language for Healthcare:   [x]English       []other:    C-SSRS Triggered by Intake questionnaire (Past 2 wk assessment ):   [x] No, Questionnaire did not trigger screening.   [] Yes, Patient intake triggered C-SSRS Screening      [] C-SSRS Screening completed  [] PCP notified via Epic     SUBJECTIVE: Patient reports she went to [facility] in October, walked 6 miles, next day had terrible pain down R LE. It eased up after stretching it and babying it.   Last weekend, went to [place], walked a lot again, and it flared again. \"I barely was dragging my leg by the end of the day. \"  Patient normally walks about 30 minutes outdoors, do strength training at home, stretching. Sometimes it is painful upon waking in am, sometimes painful after car ride, usually flares up after being on feet a while, then sits down, when gets back up it is sore and tight. \"If it's flared, I don't walk very far. But there's no prediction of what will aggravate it. \"  [place] can be okay, it hurts when it's flared. Sleeping is okay. Relevant Medical History:HBP, Osteoarthritis, R piriformis difficulty for several years  Functional Scale/Score: FOTO = 61 (11/1/22)    Pain Scale: 7/10 when in flare, currently 1/10  Easing factors: stop doing what I'm doing, rubbed out the muscle, ice, ibuprofen.    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, changes positions throughout the day, works 3@ 12 hours per day    Living Status/Prior Level of Function: Independent with ADLs and IADLs,     OBJECTIVE:   Posture: WFL    Functional Mobility/Transfers: independent    Palpation: tenderness noted R > L IT band, entire length, R piriformis    Bandages/Dressings/Incisions: N/A    Gait: (include devices/WB status) Patient demonstrates mild lat sway during SLS L LE, mild trendelenburg during SLS R LE, increases with giant steps    Single Limb Stance:  Left: Fair-, Mild lat Sway   Right:  Fair-, Mild Trendelenberg    AROM LEFT (RIGHT)   HIP Flex [facility]   Knee ext [facility]   Knee Flex [facility]   Ankle PF [facility] WFL   Ankle DF [facility]   Strength  LEFT (RIGHT)   HIP Flexors 5/5 5/5   Knee EXT (quad) 5/5 5/5   Knee Flex (HS) 5/5 5/5   Ankle DF 5/5 5/5   Ankle PF 5/5 5/5 Muscle Control LEFT (RIGHT)   Transverse Abdominus Fair- Fair-   Gluteals Poor+ Poor+   Quads Fair Fair   Hip Adductors Fair-   Fair-       Reflexes/Sensation:    [x]Dermatomes/Myotomes intact    [x]Reflexes equal and normal bilaterally   []Other:    Joint mobility:   [x]Normal    []Hypo   []Hyper    Orthopedic Special Tests:  SLR negative B, SI Instability Negative B (slight report of discomfort noted R LE Figure 4)                       [x] Patient history, allergies, meds reviewed. Medical chart reviewed. See intake form. Review Of Systems (ROS):  [x]Performed Review of systems (Integumentary, CardioPulmonary, Neurological) by intake and observation. Intake form has been scanned into medical record. Patient has been instructed to contact their primary care physician regarding ROS issues if not already being addressed at this time.       Co-morbidities/Complexities (which will affect course of rehabilitation):   []None           Arthritic conditions   []Rheumatoid arthritis (M05.9)  [x]Osteoarthritis (M19.91)   Cardiovascular conditions   [x]Hypertension (I10)  []Hyperlipidemia (E78.5)  []Angina pectoris (I20)  []Atherosclerosis (I70)  []CVA Musculoskeletal conditions   []Disc pathology   []Congenital spine pathologies   []Prior surgical intervention  []Osteoporosis (M81.8)  []Osteopenia (M85.8)   Endocrine conditions   []Hypothyroid (E03.9)  []Hyperthyroid Gastrointestinal conditions   []Constipation (G92.73)   Metabolic conditions   []Morbid obesity (E66.01)  []Diabetes type 1(E10.65) or 2 (E11.65)   []Neuropathy (G60.9)     Pulmonary conditions   []Asthma (J45)  []Coughing   []COPD (J44.9)   Psychological Disorders  []Anxiety (F41.9)  []Depression (F32.9)   []Other:   []Other:          Barriers to/and or personal factors that will affect rehab potential:              []Age  []Sex    []Smoker              [x]Motivation                     []Co-Morbidities              []Cognitive Function, education/learning barriers              []Environmental, home barriers              []profession/work barriers  []past PT/medical experience  []other:  Justification:     Falls Risk Assessment (30 days):   [x] Falls Risk assessed and no intervention required. [] Falls Risk assessed and Patient requires intervention due to being higher risk   TUG score (>12s at risk):     [] Falls education provided, including         ASSESSMENT: Patient presents with a history of  R LE pain due to muscle imbalance of core/LEs/piriformis and IT band syndrome    . Patient would benefit from PT to increase functional mobility. Functional Impairments:     []Noted lumbar/proximal hip/LE hypomobility   []Decreased LE functional ROM   [x]Decreased core/proximal hip strength and neuromuscular control   []Decreased LE functional strength   [x]Reduced balance/proprioceptive control   []other:      Functional Activity Limitations (from functional questionnaire and intake)   []Reduced ability to tolerate prolonged functional positions   []Reduced ability or difficulty with changes of positions or transfers between positions   []Reduced ability to maintain good posture and demonstrate good body mechanics with sitting, bending, and lifting   []Reduced ability to sleep   [] Reduced ability or tolerance with driving and/or computer work   []Reduced ability to perform lifting, carrying tasks   [x]Reduced ability to squat   []Reduced ability to forward bend   [x]Reduced ability to ambulate prolonged functional periods/distances/surfaces   [x]Reduced ability to ascend/descend stairs   []Reduced ability to run, hop or jump   []other:     Participation Restrictions   []Reduced participation in self care activities   []Reduced participation in home management activities   []Reduced participation in work activities   [x]Reduced participation in social activities. [x]Reduced participation in sport activities.     Classification :    []Signs/symptoms consistent with post-surgical status including decreased ROM, strength and function. []Signs/symptoms consistent with joint sprain/strain   []Signs/symptoms consistent with patella-femoral syndrome   []Signs/symptoms consistent with knee OA/hip OA   []Signs/symptoms consistent with internal derangement of knee/Hip   [x]Signs/symptoms consistent with functional hip weakness/NMR control      []Signs/symptoms consistent with tendinitis/tendinosis    []signs/symptoms consistent with pathology which may benefit from Dry needling      []other:      Prognosis/Rehab Potential:      []Excellent   [x]Good    []Fair   []Poor    Tolerance of evaluation/treatment:    []Excellent   [x]Good    []Fair   []Poor    Physical Therapy Evaluation Complexity Justification  [x] A history of present problem with:  [] no personal factors and/or comorbidities that impact the plan of care;  []1-2 personal factors and/or comorbidities that impact the plan of care  []3 personal factors and/or comorbidities that impact the plan of care  [x] An examination of body systems using standardized tests and measures addressing any of the following: body structures and functions (impairments), activity limitations, and/or participation restrictions;:  [] a total of 1-2 or more elements   [x] a total of 3 or more elements   [] a total of 4 or more elements   [x] A clinical presentation with:  [x] stable and/or uncomplicated characteristics   [] evolving clinical presentation with changing characteristics  [] unstable and unpredictable characteristics;   [x] Clinical decision making of [x] low, [] moderate, [] high complexity using standardized patient assessment instrument and/or measurable assessment of functional outcome.     [x] EVAL (LOW) 47032 (typically 20 minutes face-to-face)  [] EVAL (MOD) 25382 (typically 30 minutes face-to-face)  [] EVAL (HIGH) 82276 (typically 45 minutes face-to-face)  [] RE-EVAL     PLAN: Patient will be seen 1-3 times, initiating NuStep, Leg Ext/Leg Curl (Con/Ecc), calf stretch/incline board, soft tissue mob, kinesiotape, modalities prn. Frequency/Duration:  1-3 days per week for 4-6 Weeks:  Interventions:  [x]  Therapeutic exercise including: strength training, ROM, for Lower extremity and core   [x]  NMR activation and proprioception for LE, Glutes and Core   [x]  Manual therapy as indicated for LE, Hip and spine to include: Dry Needling/IASTM, STM, PROM, Gr I-IV mobilizations, manipulation. [x] Modalities as needed that may include: thermal agents, E-stim, Biofeedback, US, iontophoresis as indicated  [x] Patient education on joint protection, postural re-education, activity modification, progression of HEP. HEP instruction: Patient instructed in chin tucks, lower trap sets, TA sets,glut sets, quad sets, isometric hip add, piriformis/IT band stretches in sitting, hooklying, supine ; written instructions with pictures issued, patient able to demonstrate exercises. GOALS:  Patient stated goal: \"Exercise\"  [] Progressing: [] Met: [] Not Met: [] Adjusted    Therapist goals for Patient:   Short Term Goals: To be achieved in: 2 weeks  1. Independent in HEP and progression per patient tolerance, in order to prevent re-injury. [] Progressing: [] Met: [] Not Met: [] Adjusted  2. Patient will have a decrease in pain to facilitate improvement in movement, function, and ADLs as indicated by Functional Deficits. [] Progressing: [] Met: [] Not Met: [] Adjusted    Long Term Goals: To be achieved in: 4-6 weeks  1. Disability index score of 69 or more for the FOTO to assist with reaching prior level of function. [] Progressing: [] Met: [] Not Met: [] Adjusted  2. Patient will demonstrate Good neuromuscular control of core and LE musculature to allow for proper joint functioning as indicated by patients Functional Deficits. [] Progressing: [] Met: [] Not Met: [] Adjusted  3.  Patient will demonstrate an increase in Strength to good proximal hip and LE strength and control, to allow for proper gait mechanics for at least 15 minutes without compensatory movement patterns. [] Progressing: [] Met: [] Not Met: [] Adjusted  5. Patient able to negotiate stairs with a recipocal pattern without increased symptoms or compensatory movement patterns. [] Progressing: [] Met: [] Not Met: [] Adjusted     Electronically signed by:  Jinny Martin, PT [id number]      Note: If patient does not return for scheduled/recommended follow up visits, this note will serve as a discharge from care along with the most recent update on progress.

## 2022-11-01 NOTE — FLOWSHEET NOTE
[facility], [address] [address]  Phone: [phone]   Fax:     [phone]      Physical Therapy Treatment Note/ Progress Report:     Date:  2022    Patient Name:  Les De La Cruz    :  1965  MRN: [id number]      Medical/Treatment Diagnosis Information:  Diagnosis: M79.606 Pain of LE, unspecified laterality  Treatment Diagnosis: R LE Pain V89.0    Insurance/Certification information:  PT Insurance Information: [employer] [facility] Employee, 10% coinsurance, allowed 30 visits per calendar year, does not require prior authorization, has used 2 visits to date  Physician Information:  ELENA Villeda  Plan of care signed (Y/N): vignesh requested    Date of Patient follow up with Physician:      Progress Report: []  Yes  [x]  No     Date Range for reporting period:  Beginnin22  Ending:      Progress report due (10 Rx/or 30 days whichever is less):     Recertification due (POC duration/ or 90 days whichever is less):      Visit # POC/Insurance Allowable Auth Needed   1 / []Yes   [x]No     Latex Allergy:  [x]NO      []YES  Preferred Language for Healthcare:   [x]English       []other:    History of Injury: Patient reports she went to [facility] in October, walked 6 miles, next day had terrible pain down R LE. It eased up after stretching it and babying it. Last weekend, went to [place], walked a lot again, and it flared again. \"I barely was dragging my leg by the end of the day. \"  Patient normally walks about 30 minutes outdoors, do strength training at home, stretching. Sometimes it is painful upon waking in am, sometimes painful after car ride, usually flares up after being on feet a while, then sits down, when gets back up it is sore and tight. \"If it's flared, I don't walk very far. But there's no prediction of what will aggravate it. \"  [place] can be okay, it hurts when it's flared. Sleeping is okay. Relevant Medical History:HBP, Osteoarthritis, R piriformis difficulty for several years  Functional Scale/Score: FOTO = 61 (11/1/22)     Pain Scale: 7/10 when in flare, currently 1/10    SUBJECTIVE:  See eval    OBJECTIVE:  Observation:   Test measurements:       RESTRICTIONS/PRECAUTIONS:     Exercises/Interventions:     Therapeutic Ex (32806)   Min: Reps/Resistance Notes/CUES   NuStep Flexion    Calf Stretch/Incline               NMR re-education (43171)  Min: [address] Ex Program  See below   Leg Extension     Leg Curl               Therapeutic Activity (39303)  Min:10     Patient education  See below                  Manual Intervention (17274)  Min:     Soft tissue mob     IASTM     Patellar Mob     PROM     Modalities  Min:     IFC with      CP after exercises     MH after exercises            Other Therapeutic Activities: Pt was educated on PT POC, Diagnosis, Prognosis, pathomechanics as well as frequency and duration of scheduling future physical therapy appointments. Time was also taken on this day to answer all patient questions and participation in PT. Reviewed appointment policy in detail with patient and patient verbalized understanding. Discussed at length anatomy and biomechanics of the hip and knee/IT band and pirifomis. muscle reeducation, motor recruitment. Home Exercise Program:Patient instructed in chin tucks, lower trap sets, TA sets,glut sets, quad sets, isometric hip add, piriformis/IT band stretches in sitting, hooklying, supine ; written instructions with pictures issued, patient able to demonstrate exercises    ASSESSMENT:  Patient presents with a history of  R LE pain due to muscle imbalance of core/LEs/piriformis and IT band syndrome    . Patient would benefit from PT to increase functional mobility.     GOALS:  Patient stated goal: \"Exercise\"  [] Progressing: [] Met: [] Not Met: [] Adjusted     Therapist goals for Patient: Short Term Goals: To be achieved in: 2 weeks  1. Independent in HEP and progression per patient tolerance, in order to prevent re-injury. [] Progressing: [] Met: [] Not Met: [] Adjusted  2. Patient will have a decrease in pain to facilitate improvement in movement, function, and ADLs as indicated by Functional Deficits. [] Progressing: [] Met: [] Not Met: [] Adjusted     Long Term Goals: To be achieved in: 4-6 weeks  1. Disability index score of 69 or more for the FOTO to assist with reaching prior level of function. [] Progressing: [] Met: [] Not Met: [] Adjusted  2. Patient will demonstrate Good neuromuscular control of core and LE musculature to allow for proper joint functioning as indicated by patients Functional Deficits. [] Progressing: [] Met: [] Not Met: [] Adjusted  3. Patient will demonstrate an increase in Strength to good proximal hip and LE strength and control, to allow for proper gait mechanics for at least 15 minutes without compensatory movement patterns. [] Progressing: [] Met: [] Not Met: [] Adjusted  5. Patient able to negotiate stairs with a recipocal pattern without increased symptoms or compensatory movement patterns. [] Progressing: [] Met: [] Not Met: [] Adjusted          Treatment/Activity Tolerance:  [x] Patient tolerated treatment well [] Patient limited by fatique  [] Patient limited by pain  [] Patient limited by other medical complications  [] Other:     Overall Progression Towards Functional goals/ Treatment Progress Update:  [] Patient is progressing as expected towards functional goals listed. [] Progression is slowed due to complexities/Impairments listed. [] Progression has been slowed due to co-morbidities.   [x] Plan just implemented, too soon to assess goals progression <30days   [] Goals require adjustment due to lack of progress  [] Patient is not progressing as expected and requires additional follow up with physician  [] Other    Prognosis for POC: [x] Good [] Fair  [] Poor    Patient requires continued skilled intervention: [x] Yes  [] No        PLAN: Patient will be seen 1-3 times, initiating NuStep, Leg Ext/Leg Curl (Con/Ecc), calf stretch/incline board, soft tissue mob, kinesiotape, modalities prn.  [] Continue per plan of care [] Alter current plan (see comments)  [x] Plan of care initiated [] Hold pending MD visit [] Discharge    Therapeutic Exercise and NMR EXR  [] (99615) Provided verbal/tactile cueing for activities related to strengthening, flexibility, endurance, ROM for improvements in LE, proximal hip, and core control with self care, mobility, lifting, ambulation. [x] (45709) Provided verbal/tactile cueing for activities related to improving balance, coordination, kinesthetic sense, posture, motor skill, proprioception  to assist with LE, proximal hip, and core control in self care, mobility, lifting, ambulation and eccentric single leg control.      NMR and Therapeutic Activities:    [x] (48580 or 22138) Provided verbal/tactile cueing for activities related to improving balance, coordination, kinesthetic sense, posture, motor skill, proprioception and motor activation to allow for proper function of core, proximal hip and LE with self care and ADLs and functional mobility.   [] (72954) Gait Re-education- Provided training and instruction to the patient for proper LE, core and proximal hip recruitment and positioning and eccentric body weight control with ambulation re-education including up and down stairs     Home Exercise Program:    [] (62737) Reviewed/Progressed HEP activities related to strengthening, flexibility, endurance, ROM of core, proximal hip and LE for functional self-care, mobility, lifting and ambulation/stair navigation   [x] (29446)Reviewed/Progressed HEP activities related to improving balance, coordination, kinesthetic sense, posture, motor skill, proprioception of core, proximal hip and LE for self care, mobility, lifting, and ambulation/stair navigation      Manual Treatments:  PROM / STM / Oscillations-Mobs:  G-I, II, III, IV (PA's, Inf., Post.)  [] (97939) Provided manual therapy to mobilize LE, proximal hip and/or LS spine soft tissue/joints for the purpose of modulating pain, promoting relaxation,  increasing ROM, reducing/eliminating soft tissue swelling/inflammation/restriction, improving soft tissue extensibility and allowing for proper ROM for normal function with self care, mobility, lifting and ambulation. Charges:  Timed Code Treatment Minutes: 20   Total Treatment Minutes: 45      [x] EVAL (LOW) 82127 (typically 20 minutes face-to-face)  [] EVAL (MOD) 14672 (typically 30 minutes face-to-face)  [] EVAL (HIGH) 26799 (typically 45 minutes face-to-face)  [] RE-EVAL     [] HC(72499) x     [] Dry needle 1 or 2 Muscles (24824)  [x] NMR (68192) x     [] Dry needle 3+ Muscles (61488)  [] Manual (97102) x     [] Ultrasound (15528) x  [x] TA (87370) x     [] Mech Traction (70665)  [] ES(attended) (58944)     [] ES (un) (83740):   [] Vasopump (79431) [] Ionto (10139)   [] Other:      Electronically signed by: Steve Albright, PT [id number]    Note: If patient does not return for scheduled/recommended follow up visits, this note will serve as a discharge from care along with the most recent update on progress.

## 2022-11-03 DIAGNOSIS — E03.9 HYPOTHYROIDISM, UNSPECIFIED TYPE: ICD-10-CM

## 2022-11-04 RX ORDER — ATORVASTATIN CALCIUM 10 MG/1
TABLET, FILM COATED ORAL
Qty: 90 TABLET | Refills: 2 | Status: SHIPPED | OUTPATIENT
Start: 2022-11-04

## 2022-11-04 RX ORDER — LEVOTHYROXINE SODIUM 88 UG/1
TABLET ORAL
Qty: 90 TABLET | Refills: 2 | Status: SHIPPED | OUTPATIENT
Start: 2022-11-04

## 2022-11-09 ENCOUNTER — HOSPITAL ENCOUNTER (OUTPATIENT)
Dept: PHYSICAL THERAPY | Age: 57
Setting detail: THERAPIES SERIES
Discharge: HOME | End: 2022-11-09
Payer: COMMERCIAL

## 2022-11-09 PROCEDURE — 97110 THERAPEUTIC EXERCISES: CPT

## 2022-11-09 PROCEDURE — 97140 MANUAL THERAPY 1/> REGIONS: CPT

## 2022-11-09 PROCEDURE — 97112 NEUROMUSCULAR REEDUCATION: CPT

## 2022-11-09 NOTE — FLOWSHEET NOTE
[facility] - Outpatient Rehabilitation and Therapy, [address] [place] [address]  Phone: [phone]   Fax:     [phone]      Physical Therapy Treatment Note/ Progress Report:     Date:  2022    Patient Name:  Andrew Barrientos    :  1965  MRN: [id number]      Medical/Treatment Diagnosis Information:  Diagnosis: M79.606 Pain of LE, unspecified laterality  Treatment Diagnosis: R LE Pain A28.3    Insurance/Certification information:  PT Insurance Information: [facility] Employee, 10% coinsurance, allowed 30 visits per calendar year, does not require prior authorization, has used 2 visits to date  Physician Information:  ELENA Felix  Plan of care signed (Y/N): vignesh requested    Date of Patient follow up with Physician:      Progress Report: []  Yes  [x]  No     Date Range for reporting period:  Beginnin22  Ending:      Progress report due (10 Rx/or 30 days whichever is less):     Recertification due (POC duration/ or 90 days whichever is less):      Visit # POC/Insurance Allowable Auth Needed   2 12/ []Yes   [x]No     Latex Allergy:  [x]NO      []YES  Preferred Language for Healthcare:   [x]English       []other:    History of Injury: Patient reports she went to [facility] in October, walked 6 miles, next day had terrible pain down R LE. It eased up after stretching it and babying it. Last weekend, went to [place], walked a lot again, and it flared again. \"I barely was dragging my leg by the end of the day. \"  Patient normally walks about 30 minutes outdoors, do strength training at home, stretching. Sometimes it is painful upon waking in am, sometimes painful after car ride, usually flares up after being on feet a while, then sits down, when gets back up it is sore and tight. \"If it's flared, I don't walk very far. But there's no prediction of what will aggravate it. \"  Tine[employer]um[employer] can be okay, it hurts when it's flared. Sleeping is okay. Relevant Medical History:HBP, Osteoarthritis, R piriformis difficulty for several years  Functional Scale/Score: FOTO = 61 (11/1/22)     Pain Scale: 7/10 when in flare, currently 1/10    SUBJECTIVE:  See eval  11/8/22:  Patient reports she is feeling a little better. OBJECTIVE:  Observation:   Test measurements:       RESTRICTIONS/PRECAUTIONS:     Exercises/Interventions:     Therapeutic Ex (12172)   Min: 10 Reps/Resistance Notes/CUES   NuStep Level 1 x 6 minutes Seat #5, UEs #6   Calf Stretch/Incline 3 x 30 sec  Incline board             NMR re-education (75629)  Min: [place] Ex Program  See below   Leg Extension 11 pounds, 2 x 10 reps L/R Seat #3, Tibia #2   Leg Curl 11 pounds, 2 x 10 reps L/R Seat #3, Tibia #2             Therapeutic Activity (23449)  Min:     Patient education  See below                  Manual Intervention ([id number])  Min:20     Soft tissue mob R piriformis, IT band, glut mod aggressive L sidelying   IASTM     Patellar Mob     PROM     Modalities  Min:     IFC with      CP after exercises     MH after exercises            Other Therapeutic Activities: Pt was educated on PT POC, Diagnosis, Prognosis, pathomechanics as well as frequency and duration of scheduling future physical therapy appointments. Time was also taken on this day to answer all patient questions and participation in PT. Reviewed appointment policy in detail with patient and patient verbalized understanding. Discussed at length anatomy and biomechanics of the hip and knee/IT band and pirifomis. muscle reeducation, motor recruitment.      Home Exercise Program:Patient instructed in chin tucks, lower trap sets, TA sets,glut sets, quad sets, isometric hip add, piriformis/IT band stretches in sitting, hooklying, supine ; written instructions with pictures issued, patient able to demonstrate exercises  11/9/22:   Patient instructed in bent like fall out, sidelying clamshell with pillow ; written instructions with pictures issued, patient able to demonstrate exercises. ASSESSMENT:  Patient presents with a history of  R LE pain due to muscle imbalance of core/LEs/piriformis and IT band syndrome    . Patient would benefit from PT to increase functional mobility. GOALS:  Patient stated goal: \"Exercise\"  [] Progressing: [] Met: [] Not Met: [] Adjusted     Therapist goals for Patient:   Short Term Goals: To be achieved in: 2 weeks  1. Independent in HEP and progression per patient tolerance, in order to prevent re-injury. [] Progressing: [] Met: [] Not Met: [] Adjusted  2. Patient will have a decrease in pain to facilitate improvement in movement, function, and ADLs as indicated by Functional Deficits. [] Progressing: [] Met: [] Not Met: [] Adjusted     Long Term Goals: To be achieved in: 4-6 weeks  1. Disability index score of 69 or more for the FOTO to assist with reaching prior level of function. [] Progressing: [] Met: [] Not Met: [] Adjusted  2. Patient will demonstrate Good neuromuscular control of core and LE musculature to allow for proper joint functioning as indicated by patients Functional Deficits. [] Progressing: [] Met: [] Not Met: [] Adjusted  3. Patient will demonstrate an increase in Strength to good proximal hip and LE strength and control, to allow for proper gait mechanics for at least 15 minutes without compensatory movement patterns. [] Progressing: [] Met: [] Not Met: [] Adjusted  5. Patient able to negotiate stairs with a recipocal pattern without increased symptoms or compensatory movement patterns.   [] Progressing: [] Met: [] Not Met: [] Adjusted          Treatment/Activity Tolerance:  [x] Patient tolerated treatment well [] Patient limited by fatique  [] Patient limited by pain  [] Patient limited by other medical complications  [] Other:     Overall Progression Towards Functional goals/ Treatment Progress Update:  [] Patient is progressing as expected towards functional goals listed. [] Progression is slowed due to complexities/Impairments listed. [] Progression has been slowed due to co-morbidities. [x] Plan just implemented, too soon to assess goals progression <30days   [] Goals require adjustment due to lack of progress  [] Patient is not progressing as expected and requires additional follow up with physician  [] Other    Prognosis for POC: [x] Good [] Fair  [] Poor    Patient requires continued skilled intervention: [x] Yes  [] No        PLAN: Monitor response. Instruct patient in sitting in chair hamstring stretch, longsitting hip IR/ER  [x] Continue per plan of care [] Alter current plan (see comments)  [] Plan of care initiated [] Hold pending MD visit [] Discharge    Therapeutic Exercise and NMR EXR  [] (72082) Provided verbal/tactile cueing for activities related to strengthening, flexibility, endurance, ROM for improvements in LE, proximal hip, and core control with self care, mobility, lifting, ambulation. [x] (65093) Provided verbal/tactile cueing for activities related to improving balance, coordination, kinesthetic sense, posture, motor skill, proprioception  to assist with LE, proximal hip, and core control in self care, mobility, lifting, ambulation and eccentric single leg control.      NMR and Therapeutic Activities:    [x] (53844 or 16313) Provided verbal/tactile cueing for activities related to improving balance, coordination, kinesthetic sense, posture, motor skill, proprioception and motor activation to allow for proper function of core, proximal hip and LE with self care and ADLs and functional mobility.   [] (09128) Gait Re-education- Provided training and instruction to the patient for proper LE, core and proximal hip recruitment and positioning and eccentric body weight control with ambulation re-education including up and down stairs     Home Exercise Program:    [] (42139) Reviewed/Progressed HEP activities related to strengthening, flexibility, endurance, ROM of core, proximal hip and LE for functional self-care, mobility, lifting and ambulation/stair navigation   [x] (53595)Reviewed/Progressed HEP activities related to improving balance, coordination, kinesthetic sense, posture, motor skill, proprioception of core, proximal hip and LE for self care, mobility, lifting, and ambulation/stair navigation      Manual Treatments:  PROM / STM / Oscillations-Mobs:  G-I, II, III, IV (PA's, Inf., Post.)  [] (45929) Provided manual therapy to mobilize LE, proximal hip and/or LS spine soft tissue/joints for the purpose of modulating pain, promoting relaxation,  increasing ROM, reducing/eliminating soft tissue swelling/inflammation/restriction, improving soft tissue extensibility and allowing for proper ROM for normal function with self care, mobility, lifting and ambulation. Charges:  Timed Code Treatment Minutes: 45   Total Treatment Minutes: 45      [] EVAL (LOW) 60772 (typically 20 minutes face-to-face)  [] EVAL (MOD) 49003 (typically 30 minutes face-to-face)  [] EVAL (HIGH) 20338 (typically 45 minutes face-to-face)  [] RE-EVAL     [] JJ(79734) x     [] Dry needle 1 or 2 Muscles (22423)  [x] NMR (18923) x     [] Dry needle 3+ Muscles (02950)  [x] Manual (38053) x     [] Ultrasound (48582) x  [] TA (24256) x     [] Mech Traction (47121)  [] ES(attended) (14755)     [] ES (un) (36335):   [] Vasopump (46874) [] Ionto (14960)   [] Other:      Electronically signed by: Milly Johnson, PT [id number]    Note: If patient does not return for scheduled/recommended follow up visits, this note will serve as a discharge from care along with the most recent update on progress.

## 2022-11-11 ENCOUNTER — HOSPITAL ENCOUNTER (OUTPATIENT)
Dept: PHYSICAL THERAPY | Age: 57
Setting detail: THERAPIES SERIES
Discharge: HOME | End: 2022-11-11
Payer: COMMERCIAL

## 2022-11-11 PROCEDURE — 97110 THERAPEUTIC EXERCISES: CPT

## 2022-11-11 PROCEDURE — 97140 MANUAL THERAPY 1/> REGIONS: CPT

## 2022-11-11 PROCEDURE — 97112 NEUROMUSCULAR REEDUCATION: CPT

## 2022-11-11 NOTE — FLOWSHEET NOTE
[facility] - Outpatient Rehabilitation and Therapy, [address] [place] [address]  Phone: [phone]   Fax:     [phone]      Physical Therapy Treatment Note/ Progress Report:     Date:  2022    Patient Name:  Micheal Baez    :  1965  MRN: [id number]      Medical/Treatment Diagnosis Information:  Diagnosis: M79.606 Pain of LE, unspecified laterality  Treatment Diagnosis: R LE Pain Q70.4    Insurance/Certification information:  PT Insurance Information: [facility] Employee, 10% coinsurance, allowed 30 visits per calendar year, does not require prior authorization, has used 2 visits to date  Physician Information:  ELENA Villeda  Plan of care signed (Y/N): vignesh requested    Date of Patient follow up with Physician:      Progress Report: []  Yes  [x]  No     Date Range for reporting period:  Beginnin22  Ending:      Progress report due (10 Rx/or 30 days whichever is less):     Recertification due (POC duration/ or 90 days whichever is less):      Visit # POC/Insurance Allowable Auth Needed   3 12/ []Yes   [x]No     Latex Allergy:  [x]NO      []YES  Preferred Language for Healthcare:   [x]English       []other:    History of Injury: Patient reports she went to [facility] in October, walked 6 miles, next day had terrible pain down R LE. It eased up after stretching it and babying it. Last weekend, went to [place], walked a lot again, and it flared again. \"I barely was dragging my leg by the end of the day. \"  Patient normally walks about 30 minutes outdoors, do strength training at home, stretching. Sometimes it is painful upon waking in am, sometimes painful after car ride, usually flares up after being on feet a while, then sits down, when gets back up it is sore and tight. \"If it's flared, I don't walk very far. But there's no prediction of what will aggravate it. \"  [employer] can be okay, it hurts when it's flared. Sleeping is okay. Relevant Medical History:HBP, Osteoarthritis, R piriformis difficulty for several years  Functional Scale/Score: FOTO = 61 (11/1/22)     Pain Scale: 7/10 when in flare, currently 1/10    SUBJECTIVE:  See eval  11/8/22:  Patient reports she is feeling a little better. 11/11: Did very well after last session. Went to bed feeling great. Woke up with SI pain and pain in post/lat knee (sleep funny? Weather?)    OBJECTIVE:  Observation:   Test measurements:       RESTRICTIONS/PRECAUTIONS:     Exercises/Interventions:     Therapeutic Ex (22701)   Min: 10 Reps/Resistance Notes/CUES   NuStep Level 1 x 6 minutes Seat #5, UEs #6   Calf Stretch/Incline 3 x 30 sec  Incline board             NMR re-education (47173)  Min: [place] Ex Program  See below   Leg Extension 11 pounds, 2 x 10 reps L/R Seat #3, Tibia #2   Leg Curl 11 pounds, 2 x 10 reps L/R Seat #3, Tibia #2             Therapeutic Activity (12827)  Min:     Patient education  See below                  Manual Intervention ([id number])  Min:20     Soft tissue mob/IASTM R piriformis, IT band, glut mod aggressive L sidelying   IASTM     Patellar Mob     PROM     Modalities  Min:     IFC with      CP after exercises     MH after exercises            Other Therapeutic Activities: Pt was educated on PT POC, Diagnosis, Prognosis, pathomechanics as well as frequency and duration of scheduling future physical therapy appointments. Time was also taken on this day to answer all patient questions and participation in PT. Reviewed appointment policy in detail with patient and patient verbalized understanding. Discussed at length anatomy and biomechanics of the hip and knee/IT band and pirifomis. muscle reeducation, motor recruitment.      Home Exercise Program:Patient instructed in chin tucks, lower trap sets, TA sets,glut sets, quad sets, isometric hip add, piriformis/IT band stretches in sitting, hooklying, supine ; written instructions with pictures issued, patient able to demonstrate exercises  11/9/22:   Patient instructed in bent like fall out, sidelying clamshell with pillow ; written instructions with pictures issued, patient able to demonstrate exercises. 11/11: seated hamstring stretch and hip IR/ER    ASSESSMENT:  Patient presents with a history of  R LE pain due to muscle imbalance of core/LEs/piriformis and IT band syndrome    . Patient would benefit from PT to increase functional mobility. GOALS:  Patient stated goal: \"Exercise\"  [] Progressing: [] Met: [] Not Met: [] Adjusted     Therapist goals for Patient:   Short Term Goals: To be achieved in: 2 weeks  1. Independent in HEP and progression per patient tolerance, in order to prevent re-injury. [] Progressing: [] Met: [] Not Met: [] Adjusted  2. Patient will have a decrease in pain to facilitate improvement in movement, function, and ADLs as indicated by Functional Deficits. [] Progressing: [] Met: [] Not Met: [] Adjusted     Long Term Goals: To be achieved in: 4-6 weeks  1. Disability index score of 69 or more for the FOTO to assist with reaching prior level of function. [] Progressing: [] Met: [] Not Met: [] Adjusted  2. Patient will demonstrate Good neuromuscular control of core and LE musculature to allow for proper joint functioning as indicated by patients Functional Deficits. [] Progressing: [] Met: [] Not Met: [] Adjusted  3. Patient will demonstrate an increase in Strength to good proximal hip and LE strength and control, to allow for proper gait mechanics for at least 15 minutes without compensatory movement patterns. [] Progressing: [] Met: [] Not Met: [] Adjusted  5. Patient able to negotiate stairs with a recipocal pattern without increased symptoms or compensatory movement patterns.   [] Progressing: [] Met: [] Not Met: [] Adjusted          Treatment/Activity Tolerance:  [x] Patient tolerated treatment well [] Patient limited by mely  [] Patient limited by pain  [] Patient limited by other medical complications  [] Other:     Overall Progression Towards Functional goals/ Treatment Progress Update:  [] Patient is progressing as expected towards functional goals listed. [] Progression is slowed due to complexities/Impairments listed. [] Progression has been slowed due to co-morbidities. [x] Plan just implemented, too soon to assess goals progression <30days   [] Goals require adjustment due to lack of progress  [] Patient is not progressing as expected and requires additional follow up with physician  [] Other    Prognosis for POC: [x] Good [] Fair  [] Poor    Patient requires continued skilled intervention: [x] Yes  [] No        PLAN: Monitor response. [x] Continue per plan of care [] Alter current plan (see comments)  [] Plan of care initiated [] Hold pending MD visit [] Discharge    Therapeutic Exercise and NMR EXR  [] (91308) Provided verbal/tactile cueing for activities related to strengthening, flexibility, endurance, ROM for improvements in LE, proximal hip, and core control with self care, mobility, lifting, ambulation. [x] (52677) Provided verbal/tactile cueing for activities related to improving balance, coordination, kinesthetic sense, posture, motor skill, proprioception  to assist with LE, proximal hip, and core control in self care, mobility, lifting, ambulation and eccentric single leg control.      NMR and Therapeutic Activities:    [x] (30206 or 01572) Provided verbal/tactile cueing for activities related to improving balance, coordination, kinesthetic sense, posture, motor skill, proprioception and motor activation to allow for proper function of core, proximal hip and LE with self care and ADLs and functional mobility.   [] (30803) Gait Re-education- Provided training and instruction to the patient for proper LE, core and proximal hip recruitment and positioning and eccentric body weight control with ambulation re-education including up and down stairs     Home Exercise Program:    [] (60863) Reviewed/Progressed HEP activities related to strengthening, flexibility, endurance, ROM of core, proximal hip and LE for functional self-care, mobility, lifting and ambulation/stair navigation   [x] (97182)Reviewed/Progressed HEP activities related to improving balance, coordination, kinesthetic sense, posture, motor skill, proprioception of core, proximal hip and LE for self care, mobility, lifting, and ambulation/stair navigation      Manual Treatments:  PROM / STM / Oscillations-Mobs:  G-I, II, III, IV (PA's, Inf., Post.)  [] (41408) Provided manual therapy to mobilize LE, proximal hip and/or LS spine soft tissue/joints for the purpose of modulating pain, promoting relaxation,  increasing ROM, reducing/eliminating soft tissue swelling/inflammation/restriction, improving soft tissue extensibility and allowing for proper ROM for normal function with self care, mobility, lifting and ambulation. Charges:  Timed Code Treatment Minutes: 45   Total Treatment Minutes: 45      [] EVAL (LOW) 97490 (typically 20 minutes face-to-face)  [] EVAL (MOD) 11202 (typically 30 minutes face-to-face)  [] EVAL (HIGH) 81482 (typically 45 minutes face-to-face)  [] RE-EVAL     [x] MP(33534) x     [] Dry needle 1 or 2 Muscles (20259)  [x] NMR (19818) x     [] Dry needle 3+ Muscles (54670)  [x] Manual (64025) x     [] Ultrasound (52272) x  [] TA (71851) x     [] Mech Traction (17789)  [] ES(attended) (43571)     [] ES (un) (76821):   [] Vasopump (10630) [] Ionto (31133)   [] Other:      Electronically signed by: Alhaji Vilchis PTA [id number]    Note: If patient does not return for scheduled/recommended follow up visits, this note will serve as a discharge from care along with the most recent update on progress.

## 2022-11-14 ENCOUNTER — HOSPITAL ENCOUNTER (OUTPATIENT)
Dept: PHYSICAL THERAPY | Age: 57
Setting detail: THERAPIES SERIES
Discharge: HOME | End: 2022-11-14
Payer: COMMERCIAL

## 2022-11-14 PROCEDURE — 97112 NEUROMUSCULAR REEDUCATION: CPT

## 2022-11-14 PROCEDURE — 97110 THERAPEUTIC EXERCISES: CPT

## 2022-11-14 PROCEDURE — 97140 MANUAL THERAPY 1/> REGIONS: CPT

## 2022-11-14 NOTE — FLOWSHEET NOTE
[facility] - Outpatient Rehabilitation and Therapy, [address] [place] [address]  Phone: [phone]   Fax:     [phone]      Physical Therapy Treatment Note/ Progress Report:     Date:  2022    Patient Name:  Verta Habermann    :  1965  MRN: [id number]      Medical/Treatment Diagnosis Information:  Diagnosis: M79.606 Pain of LE, unspecified laterality  Treatment Diagnosis: R LE Pain E64.6    Insurance/Certification information:  PT Insurance Information: [facility] Employee, 10% coinsurance, allowed 30 visits per calendar year, does not require prior authorization, has used 2 visits to date  Physician Information:  ELENA Farr  Plan of care signed (Y/N): vignesh requested    Date of Patient follow up with Physician:      Progress Report: []  Yes  [x]  No     Date Range for reporting period:  Beginnin22  Ending:      Progress report due (10 Rx/or 30 days whichever is less):     Recertification due (POC duration/ or 90 days whichever is less):      Visit # POC/Insurance Allowable Auth Needed   4 12/ []Yes   [x]No     Latex Allergy:  [x]NO      []YES  Preferred Language for Healthcare:   [x]English       []other:    History of Injury: Patient reports she went to [facility] in October, walked 6 miles, next day had terrible pain down R LE. It eased up after stretching it and babying it. Last weekend, went to [place], walked a lot again, and it flared again. \"I barely was dragging my leg by the end of the day. \"  Patient normally walks about 30 minutes outdoors, do strength training at home, stretching. Sometimes it is painful upon waking in am, sometimes painful after car ride, usually flares up after being on feet a while, then sits down, when gets back up it is sore and tight. \"If it's flared, I don't walk very far. But there's no prediction of what will aggravate it. \"  Kalli Britton can be okay, it hurts when it's flared. Sleeping is okay. Relevant Medical History:HBP, Osteoarthritis, R piriformis difficulty for several years  Functional Scale/Score: FOTO = 61 (11/1/22)     Pain Scale: 7/10 when in flare, currently 1/10    SUBJECTIVE:  See eval  11/8/22:  Patient reports she is feeling a little better. 11/11: Did very well after last session. Went to bed feeling great. Woke up with SI pain and pain in post/lat knee (sleep funny? Weather?)  11/14: A little sore. Did some painting over the weekend. Has not had any of the lateral thigh pain    OBJECTIVE:  Observation:   Test measurements:       RESTRICTIONS/PRECAUTIONS:     Exercises/Interventions:     Therapeutic Ex (64515)   Min: 10 Reps/Resistance Notes/CUES   NuStep Level 1 x 6 minutes Seat #5, UEs #6   Calf Stretch/Incline 3 x 30 sec  Incline board             NMR re-education (65065)  Min: [address] Ex Program  See below   Leg Extension 11 pounds, 3 x 10 reps L/R Seat #3, Tibia #2   Leg Curl 11 pounds, 3 x 10 reps L/R Seat #3, Tibia #2   Tband  Rows              Lats              Ext Yellow x10  Yellow x10  Yellow x10         Therapeutic Activity (80313)  Min:     Patient education  See below                  Manual Intervention ([address] [facility])  Min:20     Soft tissue mob/IASTM R piriformis, IT band, glut mod aggressive L sidelying   IASTM     Patellar Mob     PROM     Modalities  Min:     IFC with      CP after exercises     MH after exercises            Other Therapeutic Activities: Pt was educated on PT POC, Diagnosis, Prognosis, pathomechanics as well as frequency and duration of scheduling future physical therapy appointments. Time was also taken on this day to answer all patient questions and participation in PT. Reviewed appointment policy in detail with patient and patient verbalized understanding. Discussed at length anatomy and biomechanics of the hip and knee/IT band and pirifomis. muscle reeducation, motor recruitment.      Home Exercise Program:Patient instructed in chin tucks, lower trap sets, TA sets,glut sets, quad sets, isometric hip add, piriformis/IT band stretches in sitting, hooklying, supine ; written instructions with pictures issued, patient able to demonstrate exercises  11/9/22:   Patient instructed in bent like fall out, sidelying clamshell with pillow ; written instructions with pictures issued, patient able to demonstrate exercises. 11/11: seated hamstring stretch and hip IR/ER    ASSESSMENT:  Patient presents with a history of  R LE pain due to muscle imbalance of core/LEs/piriformis and IT band syndrome    . Patient would benefit from PT to increase functional mobility. GOALS:  Patient stated goal: \"Exercise\"  [] Progressing: [] Met: [] Not Met: [] Adjusted     Therapist goals for Patient:   Short Term Goals: To be achieved in: 2 weeks  1. Independent in HEP and progression per patient tolerance, in order to prevent re-injury. [] Progressing: [] Met: [] Not Met: [] Adjusted  2. Patient will have a decrease in pain to facilitate improvement in movement, function, and ADLs as indicated by Functional Deficits. [] Progressing: [] Met: [] Not Met: [] Adjusted     Long Term Goals: To be achieved in: 4-6 weeks  1. Disability index score of 69 or more for the FOTO to assist with reaching prior level of function. [] Progressing: [] Met: [] Not Met: [] Adjusted  2. Patient will demonstrate Good neuromuscular control of core and LE musculature to allow for proper joint functioning as indicated by patients Functional Deficits. [] Progressing: [] Met: [] Not Met: [] Adjusted  3. Patient will demonstrate an increase in Strength to good proximal hip and LE strength and control, to allow for proper gait mechanics for at least 15 minutes without compensatory movement patterns. [] Progressing: [] Met: [] Not Met: [] Adjusted  5.  Patient able to negotiate stairs with a recipocal pattern without increased symptoms or compensatory movement patterns. [] Progressing: [] Met: [] Not Met: [] Adjusted          Treatment/Activity Tolerance:  [x] Patient tolerated treatment well [] Patient limited by fatique  [] Patient limited by pain  [] Patient limited by other medical complications  [] Other:     Overall Progression Towards Functional goals/ Treatment Progress Update:  [] Patient is progressing as expected towards functional goals listed. [] Progression is slowed due to complexities/Impairments listed. [] Progression has been slowed due to co-morbidities. [x] Plan just implemented, too soon to assess goals progression <30days   [] Goals require adjustment due to lack of progress  [] Patient is not progressing as expected and requires additional follow up with physician  [] Other    Prognosis for POC: [x] Good [] Fair  [] Poor    Patient requires continued skilled intervention: [x] Yes  [] No        PLAN: Monitor response. [x] Continue per plan of care [] Alter current plan (see comments)  [] Plan of care initiated [] Hold pending MD visit [] Discharge    Therapeutic Exercise and NMR EXR  [] (79868) Provided verbal/tactile cueing for activities related to strengthening, flexibility, endurance, ROM for improvements in LE, proximal hip, and core control with self care, mobility, lifting, ambulation. [x] (34889) Provided verbal/tactile cueing for activities related to improving balance, coordination, kinesthetic sense, posture, motor skill, proprioception  to assist with LE, proximal hip, and core control in self care, mobility, lifting, ambulation and eccentric single leg control.      NMR and Therapeutic Activities:    [x] (94906 or 31734) Provided verbal/tactile cueing for activities related to improving balance, coordination, kinesthetic sense, posture, motor skill, proprioception and motor activation to allow for proper function of core, proximal hip and LE with self care and ADLs and functional mobility.   [] (10795) Gait Re-education- Provided training and instruction to the patient for proper LE, core and proximal hip recruitment and positioning and eccentric body weight control with ambulation re-education including up and down stairs     Home Exercise Program:    [] (95760) Reviewed/Progressed HEP activities related to strengthening, flexibility, endurance, ROM of core, proximal hip and LE for functional self-care, mobility, lifting and ambulation/stair navigation   [x] (98452)Reviewed/Progressed HEP activities related to improving balance, coordination, kinesthetic sense, posture, motor skill, proprioception of core, proximal hip and LE for self care, mobility, lifting, and ambulation/stair navigation      Manual Treatments:  PROM / STM / Oscillations-Mobs:  G-I, II, III, IV (PA's, Inf., Post.)  [] (04655) Provided manual therapy to mobilize LE, proximal hip and/or LS spine soft tissue/joints for the purpose of modulating pain, promoting relaxation,  increasing ROM, reducing/eliminating soft tissue swelling/inflammation/restriction, improving soft tissue extensibility and allowing for proper ROM for normal function with self care, mobility, lifting and ambulation.        Charges:  Timed Code Treatment Minutes: 50   Total Treatment Minutes: 50      [] EVAL (LOW) 57304 (typically 20 minutes face-to-face)  [] EVAL (MOD) 87843 (typically 30 minutes face-to-face)  [] EVAL (HIGH) 46898 (typically 45 minutes face-to-face)  [] RE-EVAL     [x] WP(01083) x     [] Dry needle 1 or 2 Muscles (86387)  [x] NMR (31669) x     [] Dry needle 3+ Muscles (72839)  [x] Manual (47177) x     [] Ultrasound (05325) x  [] TA (92852) x     [] Mech Traction (92234)  [] ES(attended) (06811)     [] ES (un) (32048):   [] Vasopump (31931) [] Ionto (22053)   [] Other:      Electronically signed by: Queta Ames PTA [id number]    Note: If patient does not return for scheduled/recommended follow up visits, this note will serve as a discharge from care along with the most recent update on progress.

## 2022-11-22 ENCOUNTER — HOSPITAL ENCOUNTER (OUTPATIENT)
Dept: PHYSICAL THERAPY | Age: 57
Setting detail: THERAPIES SERIES
Discharge: HOME | End: 2022-11-22
Payer: COMMERCIAL

## 2022-11-22 PROCEDURE — 97110 THERAPEUTIC EXERCISES: CPT

## 2022-11-22 PROCEDURE — 97112 NEUROMUSCULAR REEDUCATION: CPT

## 2022-11-22 PROCEDURE — 97140 MANUAL THERAPY 1/> REGIONS: CPT

## 2022-11-22 NOTE — FLOWSHEET NOTE
[facility] - Outpatient Rehabilitation and Therapy, [address] [place] [address]  Phone: [phone]   Fax:     [phone]      Physical Therapy Treatment Note/ Progress Report:     Date:  2022    Patient Name:  Georgia Acharya    :  1965  MRN: [id number]      Medical/Treatment Diagnosis Information:  Diagnosis: M79.606 Pain of LE, unspecified laterality  Treatment Diagnosis: R LE Pain B24.0    Insurance/Certification information:  PT Insurance Information: [facility] Employee, 10% coinsurance, allowed 30 visits per calendar year, does not require prior authorization, has used 2 visits to date  Physician Information:  ELENA Carrillo  Plan of care signed (Y/N): vignesh requested    Date of Patient follow up with Physician:      Progress Report: []  Yes  [x]  No     Date Range for reporting period:  Beginnin22  Ending:      Progress report due (10 Rx/or 30 days whichever is less):     Recertification due (POC duration/ or 90 days whichever is less):      Visit # POC/Insurance Allowable Auth Needed   5 12/ []Yes   [x]No     Latex Allergy:  [x]NO      []YES  Preferred Language for Healthcare:   [x]English       []other:    History of Injury: Patient reports she went to [facility] in October, walked 6 miles, next day had terrible pain down R LE. It eased up after stretching it and babying it. Last weekend, went to [place], walked a lot again, and it flared again. \"I barely was dragging my leg by the end of the day. \"  Patient normally walks about 30 minutes outdoors, do strength training at home, stretching. Sometimes it is painful upon waking in am, sometimes painful after car ride, usually flares up after being on feet a while, then sits down, when gets back up it is sore and tight. \"If it's flared, I don't walk very far. But there's no prediction of what will aggravate it. \"  Duc Harvey can be okay, it hurts when it's flared. Sleeping is okay. Relevant Medical History:HBP, Osteoarthritis, R piriformis difficulty for several years  Functional Scale/Score: FOTO = 61 (11/1/22)     Pain Scale: 7/10 when in flare, currently 1/10    SUBJECTIVE:  See eval  11/8/22:  Patient reports she is feeling a little better. 11/11: Did very well after last session. Went to bed feeling great. Woke up with SI pain and pain in post/lat knee (sleep funny? Weather?)  11/14: A little sore. Did some painting over the weekend. Has not had any of the lateral thigh pain  11/22/22:  Patient is doing better, has some soreness in buttock but thigh and knee have been better. Patient has started to do a little yoga on lunch break, which feels good. OBJECTIVE:  Observation: min/mod tightness R piriformis, superior gluteals  Test measurements:       RESTRICTIONS/PRECAUTIONS:     Exercises/Interventions:     Therapeutic Ex (15315)   Min: 10 Reps/Resistance Notes/CUES   NuStep Level 1 x 6 minutes Seat #5, UEs #6   Calf Stretch/Incline 3 x 30 sec  Incline board             NMR re-education (89397)  Min: [address] Ex Program  See below   Leg Extension 11 pounds, 3 x 10 reps L/R Seat #3, Tibia #2   Leg Curl 11 pounds, 3 x 10 reps L/R Seat #3, Tibia #2   Tband  Rows              Lats              Ext Yellow x10  Yellow x10  Yellow x10         Therapeutic Activity (44215)  Min:     Patient education  See below                  Manual Intervention ([id number])  Min:20     Soft tissue mob/IASTM R piriformis, IT band, glut mod aggressive L sidelying   IASTM     Patellar Mob     PROM     Modalities  Min:     IFC with      CP after exercises     MH after exercises            Other Therapeutic Activities: Pt was educated on PT POC, Diagnosis, Prognosis, pathomechanics as well as frequency and duration of scheduling future physical therapy appointments. Time was also taken on this day to answer all patient questions and participation in PT.  Reviewed appointment policy in detail with patient and patient verbalized understanding. Discussed at length anatomy and biomechanics of the hip and knee/IT band and pirifomis. muscle reeducation, motor recruitment. Home Exercise Program:Patient instructed in chin tucks, lower trap sets, TA sets,glut sets, quad sets, isometric hip add, piriformis/IT band stretches in sitting, hooklying, supine ; written instructions with pictures issued, patient able to demonstrate exercises  11/9/22:   Patient instructed in bent like fall out, sidelying clamshell with pillow ; written instructions with pictures issued, patient able to demonstrate exercises. 11/11: seated hamstring stretch and hip IR/ER  11/22/22:   Patient instructed in prone and standing hip ext ; written instructions with pictures issued, patient able to demonstrate exercises. ASSESSMENT:  Patient presents with a history of  R LE pain due to muscle imbalance of core/LEs/piriformis and IT band syndrome    . Patient would benefit from PT to increase functional mobility. GOALS:  Patient stated goal: \"Exercise\"  [] Progressing: [] Met: [] Not Met: [] Adjusted     Therapist goals for Patient:   Short Term Goals: To be achieved in: 2 weeks  1. Independent in HEP and progression per patient tolerance, in order to prevent re-injury. [] Progressing: [] Met: [] Not Met: [] Adjusted  2. Patient will have a decrease in pain to facilitate improvement in movement, function, and ADLs as indicated by Functional Deficits. [] Progressing: [] Met: [] Not Met: [] Adjusted     Long Term Goals: To be achieved in: 4-6 weeks  1. Disability index score of 69 or more for the FOTO to assist with reaching prior level of function. [] Progressing: [] Met: [] Not Met: [] Adjusted  2. Patient will demonstrate Good neuromuscular control of core and LE musculature to allow for proper joint functioning as indicated by patients Functional Deficits.    [] Progressing: [] Met: [] Not Met: [] Adjusted  3. Patient will demonstrate an increase in Strength to good proximal hip and LE strength and control, to allow for proper gait mechanics for at least 15 minutes without compensatory movement patterns. [] Progressing: [] Met: [] Not Met: [] Adjusted  5. Patient able to negotiate stairs with a recipocal pattern without increased symptoms or compensatory movement patterns. [] Progressing: [] Met: [] Not Met: [] Adjusted          Treatment/Activity Tolerance:  [x] Patient tolerated treatment well [] Patient limited by fatique  [] Patient limited by pain  [] Patient limited by other medical complications  [] Other:     Overall Progression Towards Functional goals/ Treatment Progress Update:  [] Patient is progressing as expected towards functional goals listed. [] Progression is slowed due to complexities/Impairments listed. [] Progression has been slowed due to co-morbidities. [x] Plan just implemented, too soon to assess goals progression <30days   [] Goals require adjustment due to lack of progress  [] Patient is not progressing as expected and requires additional follow up with physician  [] Other    Prognosis for POC: [x] Good [] Fair  [] Poor    Patient requires continued skilled intervention: [x] Yes  [] No        PLAN: Monitor response. Instruct patient in theraband ex for HEP. [x] Continue per plan of care [] Alter current plan (see comments)  [] Plan of care initiated [] Hold pending MD visit [] Discharge    Therapeutic Exercise and NMR EXR  [] (99162) Provided verbal/tactile cueing for activities related to strengthening, flexibility, endurance, ROM for improvements in LE, proximal hip, and core control with self care, mobility, lifting, ambulation.   [x] (88459) Provided verbal/tactile cueing for activities related to improving balance, coordination, kinesthetic sense, posture, motor skill, proprioception  to assist with LE, proximal hip, and core control in self care, mobility, lifting, ambulation and eccentric single leg control. NMR and Therapeutic Activities:    [x] (19421 or 06749) Provided verbal/tactile cueing for activities related to improving balance, coordination, kinesthetic sense, posture, motor skill, proprioception and motor activation to allow for proper function of core, proximal hip and LE with self care and ADLs and functional mobility.   [] (86842) Gait Re-education- Provided training and instruction to the patient for proper LE, core and proximal hip recruitment and positioning and eccentric body weight control with ambulation re-education including up and down stairs     Home Exercise Program:    [] (42527) Reviewed/Progressed HEP activities related to strengthening, flexibility, endurance, ROM of core, proximal hip and LE for functional self-care, mobility, lifting and ambulation/stair navigation   [x] (90802)Reviewed/Progressed HEP activities related to improving balance, coordination, kinesthetic sense, posture, motor skill, proprioception of core, proximal hip and LE for self care, mobility, lifting, and ambulation/stair navigation      Manual Treatments:  PROM / STM / Oscillations-Mobs:  G-I, II, III, IV (PA's, Inf., Post.)  [] (51323) Provided manual therapy to mobilize LE, proximal hip and/or LS spine soft tissue/joints for the purpose of modulating pain, promoting relaxation,  increasing ROM, reducing/eliminating soft tissue swelling/inflammation/restriction, improving soft tissue extensibility and allowing for proper ROM for normal function with self care, mobility, lifting and ambulation.        Charges:  Timed Code Treatment Minutes: 50   Total Treatment Minutes: 50      [] EVAL (LOW) 27918 (typically 20 minutes face-to-face)  [] EVAL (MOD) 47520 (typically 30 minutes face-to-face)  [] EVAL (HIGH) 99186 (typically 45 minutes face-to-face)  [] RE-EVAL     [x] ES(05138) x     [] Dry needle 1 or 2 Muscles (62834)  [x] NMR (74584) x     [] Dry needle 3+ Muscles (61651)  [x] Manual (79752) x     [] Ultrasound (70253) x  [] TA (63329) x     [] Mech Traction (66153)  [] ES(attended) (90338)     [] ES (un) (76029):   [] Vasopump (08680) [] Ionto (68975)   [] Other:      Electronically signed by: Neo Grijalva, PT [id number]    Note: If patient does not return for scheduled/recommended follow up visits, this note will serve as a discharge from care along with the most recent update on progress.

## 2022-11-29 ENCOUNTER — E-VISIT (OUTPATIENT)
Dept: FAMILY MEDICINE CLINIC | Age: 57
End: 2022-11-29

## 2022-11-29 ENCOUNTER — HOSPITAL ENCOUNTER (OUTPATIENT)
Dept: PHYSICAL THERAPY | Age: 57
Setting detail: THERAPIES SERIES
Discharge: HOME | End: 2022-11-29
Payer: COMMERCIAL

## 2022-11-29 PROCEDURE — 97112 NEUROMUSCULAR REEDUCATION: CPT

## 2022-11-29 PROCEDURE — 97110 THERAPEUTIC EXERCISES: CPT

## 2022-11-29 PROCEDURE — 97140 MANUAL THERAPY 1/> REGIONS: CPT

## 2022-11-29 RX ORDER — AMOXICILLIN AND CLAVULANATE POTASSIUM 875; 125 MG/1; MG/1
1 TABLET, FILM COATED ORAL 2 TIMES DAILY
Qty: 20 TABLET | Refills: 0 | Status: SHIPPED | OUTPATIENT
Start: 2022-11-29 | End: 2022-12-09

## 2022-11-29 ASSESSMENT — LIFESTYLE VARIABLES: SMOKING_STATUS: NO, I'VE NEVER SMOKED

## 2022-11-30 ENCOUNTER — HOSPITAL ENCOUNTER (OUTPATIENT)
Dept: MAMMOGRAPHY | Age: 57
Discharge: HOME | End: 2022-11-30
Payer: COMMERCIAL

## 2022-11-30 VITALS — WEIGHT: 191 LBS | BODY MASS INDEX: 35.15 KG/M2 | HEIGHT: 62 IN

## 2022-11-30 DIAGNOSIS — Z12.31 VISIT FOR SCREENING MAMMOGRAM: ICD-10-CM

## 2022-11-30 PROCEDURE — 77063 BREAST TOMOSYNTHESIS BI: CPT

## 2022-12-01 ENCOUNTER — APPOINTMENT (OUTPATIENT)
Dept: PHYSICAL THERAPY | Age: 57
End: 2022-12-01
Payer: COMMERCIAL

## 2022-12-07 ENCOUNTER — HOSPITAL ENCOUNTER (OUTPATIENT)
Dept: PHYSICAL THERAPY | Age: 57
Setting detail: THERAPIES SERIES
Discharge: HOME | End: 2022-12-07
Payer: COMMERCIAL

## 2022-12-07 PROCEDURE — 97140 MANUAL THERAPY 1/> REGIONS: CPT

## 2022-12-07 PROCEDURE — 97110 THERAPEUTIC EXERCISES: CPT

## 2022-12-07 PROCEDURE — 97112 NEUROMUSCULAR REEDUCATION: CPT

## 2022-12-07 NOTE — FLOWSHEET NOTE
[facility], [address] [facility] [address]  Phone: [phone]   Fax:     [phone]      Physical Therapy Discharge Summary    Dear ELENA Seo,    We had the pleasure of treating the following patient for physical therapy services at [address] [place] [address]. A summary of our findings can be found in the discharge summary below. If you have any questions or concerns regarding these findings, please do not hesitate to contact me at the office phone number above.   Thank you for the referral.         Overall Response to Treatment:   [x]Patient is responding well to treatment and improvement is noted with regards  to goals   []Patient should continue to improve in reasonable time if they continue HEP   []Patient has plateaued and is no longer responding to skilled PT intervention    []Patient is getting worse and would benefit from return to referring MD   []Patient unable to adhere to initial POC   [x]Other: PT goals have been met    Date range of Visits: 22 thru 22  Total Visits: 7     Physical Therapy Treatment Note/ Progress Report:     Date:  2022    Patient Name:  Nathaniel Koyanagi    :  1965  MRN: [id number]      Medical/Treatment Diagnosis Information:  Diagnosis: M79.606 Pain of LE, unspecified laterality  Treatment Diagnosis: R LE Pain X30.8    Insurance/Certification information:  PT Insurance Information: [employer] Employee, 10% coinsurance, allowed 30 visits per calendar year, does not require prior authorization, has used 2 visits to date  Physician Information:  ELENA Seo  Plan of care signed (Y/N): cosign requested    Date of Patient follow up with Physician:      Progress Report: []  Yes  [x]  No     Date Range for reporting period:  Beginnin22  Endin22    Progress report due (10 Rx/or 30 days whichever is less):     Recertification due (POC duration/ or 90 days whichever is less):      Visit # POC/Insurance Allowable Auth Needed   7 12/ []Yes   [x]No     Latex Allergy:  [x]NO      []YES  Preferred Language for Healthcare:   [x]English       []other:    History of Injury: Patient reports she went to [facility] in October, walked 6 miles, next day had terrible pain down R LE. It eased up after stretching it and babying it. Last weekend, went to [place], walked a lot again, and it flared again. \"I barely was dragging my leg by the end of the day. \"  Patient normally walks about 30 minutes outdoors, do strength training at home, stretching. Sometimes it is painful upon waking in am, sometimes painful after car ride, usually flares up after being on feet a while, then sits down, when gets back up it is sore and tight. \"If it's flared, I don't walk very far. But there's no prediction of what will aggravate it. \"  Kolby Setter can be okay, it hurts when it's flared. Sleeping is okay. Relevant Medical History:HBP, Osteoarthritis, R piriformis difficulty for several years  Functional Scale/Score: FOTO = 61 (11/1/22)                                            FOTO = 83 (12/7/22)     Pain Scale: 0-2/10 when in flare, currently 0-1/10    SUBJECTIVE:  See eval  11/8/22:  Patient reports she is feeling a little better. 11/11: Did very well after last session. Went to bed feeling great. Woke up with SI pain and pain in post/lat knee (sleep funny? Weather?)  11/14: A little sore. Did some painting over the weekend. Has not had any of the lateral thigh pain  11/22/22:  Patient is doing better, has some soreness in buttock but thigh and knee have been better. Patient has started to do a little yoga on lunch break, which feels good. 11/29: Doing pretty good. Leg does not feel shaky or unsteady when she plants her foot anymore. Had to work yesterday and sitting so much causes her to tighten up.  Tries to get and move around a lot. (Reports tightness in glute medius)  12/7/22:  Patient reports doing pretty good, a little tender after boxing up a lot of totes, etc.    OBJECTIVE:  Observation: min tightness R piriformis, superior gluteals  Test measurements:       RESTRICTIONS/PRECAUTIONS:     Exercises/Interventions:     Therapeutic Ex (33753)   Min: 10 Reps/Resistance Notes/CUES   NuStep Level 1 x 6 minutes Seat #5, UEs #6   Calf Stretch/Incline 3 x 30 sec  Incline board             NMR re-education (96334)  Min: [facility]  See below   Leg Extension 11 pounds, 3 x 10 reps L/R Seat #3, Tibia #2   Leg Curl 11 pounds, 3 x 10 reps L/R Seat #3, Tibia #2   Tband  Rows              Lats              Ext Yellow x10  Yellow x10  Yellow x10         Therapeutic Activity (59379)  Min:     Patient education  See below                  Manual Intervention ([phone])  Min:20     Soft tissue mob/IASTM R piriformis, IT band, glut mod aggressive L sidelying   IASTM     Patellar Mob     PROM     Modalities  Min:     IFC with      CP after exercises     MH after exercises            Other Therapeutic Activities: Pt was educated on PT POC, Diagnosis, Prognosis, pathomechanics as well as frequency and duration of scheduling future physical therapy appointments. Time was also taken on this day to answer all patient questions and participation in PT. Reviewed appointment policy in detail with patient and patient verbalized understanding. Discussed at length anatomy and biomechanics of the hip and knee/IT band and pirifomis. muscle reeducation, motor recruitment.      Home Exercise Program:Patient instructed in chin tucks, lower trap sets, TA sets,glut sets, quad sets, isometric hip add, piriformis/IT band stretches in sitting, hooklying, supine ; written instructions with pictures issued, patient able to demonstrate exercises  11/9/22:   Patient instructed in bent like fall out, sidelying clamshell with pillow ; written instructions with pictures issued, patient able to demonstrate exercises. 11/11: seated hamstring stretch and hip IR/ER  11/22/22:   Patient instructed in prone and standing hip ext ; written instructions with pictures issued, patient able to demonstrate exercises. 11/29: Tband row, lats, and extensions with orange band    ASSESSMENT:  PT goals have been met. Patient is independent with a written HEP. GOALS:  Patient stated goal: \"Exercise\"  [] Progressing: [x] Met: [] Not Met: [] Adjusted     Therapist goals for Patient:   Short Term Goals: To be achieved in: 2 weeks  1. Independent in HEP and progression per patient tolerance, in order to prevent re-injury. [] Progressing: [x] Met: [] Not Met: [] Adjusted  2. Patient will have a decrease in pain to facilitate improvement in movement, function, and ADLs as indicated by Functional Deficits. [] Progressing: [x] Met: [] Not Met: [] Adjusted     Long Term Goals: To be achieved in: 4-6 weeks  1. Disability index score of 69 or more for the FOTO to assist with reaching prior level of function. [] Progressing: [x] Met: [] Not Met: [] Adjusted  2. Patient will demonstrate Good neuromuscular control of core and LE musculature to allow for proper joint functioning as indicated by patients Functional Deficits. [] Progressing: [x] Met: [] Not Met: [] Adjusted  3. Patient will demonstrate an increase in Strength to good proximal hip and LE strength and control, to allow for proper gait mechanics for at least 15 minutes without compensatory movement patterns. [] Progressing: [x] Met: [] Not Met: [] Adjusted  5. Patient able to negotiate stairs with a recipocal pattern without increased symptoms or compensatory movement patterns.   [] Progressing: [x] Met: [] Not Met: [] Adjusted          Treatment/Activity Tolerance:  [x] Patient tolerated treatment well [] Patient limited by fatique  [] Patient limited by pain  [] Patient limited by other medical complications  [] Other: Overall Progression Towards Functional goals/ Treatment Progress Update:  [] Patient is progressing as expected towards functional goals listed. [] Progression is slowed due to complexities/Impairments listed. [] Progression has been slowed due to co-morbidities. [x] Plan just implemented, too soon to assess goals progression <30days   [] Goals require adjustment due to lack of progress  [] Patient is not progressing as expected and requires additional follow up with physician  [] Other    Prognosis for POC: [x] Good [] Fair  [] Poor    Patient requires continued skilled intervention: [] Yes  [x] No        PLAN: Patient is discharged to an independent [facility]. [] Continue per plan of care [] Alter current plan (see comments)  [] Plan of care initiated [] Hold pending MD visit [x] Discharge    Therapeutic Exercise and NMR EXR  [] (05926) Provided verbal/tactile cueing for activities related to strengthening, flexibility, endurance, ROM for improvements in LE, proximal hip, and core control with self care, mobility, lifting, ambulation. [x] (52902) Provided verbal/tactile cueing for activities related to improving balance, coordination, kinesthetic sense, posture, motor skill, proprioception  to assist with LE, proximal hip, and core control in self care, mobility, lifting, ambulation and eccentric single leg control.      NMR and Therapeutic Activities:    [x] (14009 or 14306) Provided verbal/tactile cueing for activities related to improving balance, coordination, kinesthetic sense, posture, motor skill, proprioception and motor activation to allow for proper function of core, proximal hip and LE with self care and ADLs and functional mobility.   [] (74501) Gait Re-education- Provided training and instruction to the patient for proper LE, core and proximal hip recruitment and positioning and eccentric body weight control with ambulation re-education including up and down stairs     Home Exercise Program: [] (73423) Reviewed/Progressed HEP activities related to strengthening, flexibility, endurance, ROM of core, proximal hip and LE for functional self-care, mobility, lifting and ambulation/stair navigation   [x] (73654)Reviewed/Progressed HEP activities related to improving balance, coordination, kinesthetic sense, posture, motor skill, proprioception of core, proximal hip and LE for self care, mobility, lifting, and ambulation/stair navigation      Manual Treatments:  PROM / STM / Oscillations-Mobs:  G-I, II, III, IV (PA's, Inf., Post.)  [] (12809) Provided manual therapy to mobilize LE, proximal hip and/or LS spine soft tissue/joints for the purpose of modulating pain, promoting relaxation,  increasing ROM, reducing/eliminating soft tissue swelling/inflammation/restriction, improving soft tissue extensibility and allowing for proper ROM for normal function with self care, mobility, lifting and ambulation. Charges:  Timed Code Treatment Minutes: 50   Total Treatment Minutes: 50      [] EVAL (LOW) 66619 (typically 20 minutes face-to-face)  [] EVAL (MOD) 86046 (typically 30 minutes face-to-face)  [] EVAL (HIGH) 13609 (typically 45 minutes face-to-face)  [] RE-EVAL     [x] JM(21888) x     [] Dry needle 1 or 2 Muscles (65204)  [x] NMR (48216) x     [] Dry needle 3+ Muscles (22279)  [x] Manual (97590) x     [] Ultrasound (17213) x  [] TA (79726) x     [] Mech Traction (59792)  [] ES(attended) (65077)     [] ES (un) (70522):   [] Vasopump (57617) [] Ionto (97817)   [] Other:      Electronically signed by: William Ritter, PT [id number]    Note: If patient does not return for scheduled/recommended follow up visits, this note will serve as a discharge from care along with the most recent update on progress.

## 2022-12-08 RX ORDER — FLUCONAZOLE 150 MG/1
150 TABLET ORAL
Qty: 2 TABLET | Refills: 0 | Status: SHIPPED | OUTPATIENT
Start: 2022-12-08

## 2023-01-03 DIAGNOSIS — R00.0 TACHYCARDIA: ICD-10-CM

## 2023-01-03 RX ORDER — METOPROLOL SUCCINATE 25 MG/1
TABLET, EXTENDED RELEASE ORAL
Qty: 90 TABLET | Refills: 2 | Status: SHIPPED | OUTPATIENT
Start: 2023-01-03

## 2023-03-14 ENCOUNTER — TELEMEDICINE (OUTPATIENT)
Dept: FAMILY MEDICINE CLINIC | Age: 58
End: 2023-03-14
Payer: COMMERCIAL

## 2023-03-14 DIAGNOSIS — F33.1 MODERATE EPISODE OF RECURRENT MAJOR DEPRESSIVE DISORDER (HCC): ICD-10-CM

## 2023-03-14 DIAGNOSIS — J01.90 ACUTE BACTERIAL SINUSITIS: Primary | ICD-10-CM

## 2023-03-14 DIAGNOSIS — B96.89 ACUTE BACTERIAL SINUSITIS: Primary | ICD-10-CM

## 2023-03-14 PROCEDURE — 99214 OFFICE O/P EST MOD 30 MIN: CPT | Performed by: NURSE PRACTITIONER

## 2023-03-14 RX ORDER — FLUCONAZOLE 150 MG/1
150 TABLET ORAL
Qty: 2 TABLET | Refills: 0 | Status: SHIPPED | OUTPATIENT
Start: 2023-03-14

## 2023-03-14 RX ORDER — AMOXICILLIN AND CLAVULANATE POTASSIUM 875; 125 MG/1; MG/1
1 TABLET, FILM COATED ORAL 2 TIMES DAILY
Qty: 20 TABLET | Refills: 0 | Status: SHIPPED | OUTPATIENT
Start: 2023-03-14 | End: 2023-03-24

## 2023-03-14 RX ORDER — METHYLPREDNISOLONE 4 MG/1
TABLET ORAL
Qty: 1 KIT | Refills: 0 | Status: SHIPPED | OUTPATIENT
Start: 2023-03-14

## 2023-03-14 ASSESSMENT — PATIENT HEALTH QUESTIONNAIRE - PHQ9
SUM OF ALL RESPONSES TO PHQ QUESTIONS 1-9: 0
SUM OF ALL RESPONSES TO PHQ QUESTIONS 1-9: 0
7. TROUBLE CONCENTRATING ON THINGS, SUCH AS READING THE NEWSPAPER OR WATCHING TELEVISION: 0
9. THOUGHTS THAT YOU WOULD BE BETTER OFF DEAD, OR OF HURTING YOURSELF: 0
4. FEELING TIRED OR HAVING LITTLE ENERGY: 0
10. IF YOU CHECKED OFF ANY PROBLEMS, HOW DIFFICULT HAVE THESE PROBLEMS MADE IT FOR YOU TO DO YOUR WORK, TAKE CARE OF THINGS AT HOME, OR GET ALONG WITH OTHER PEOPLE: 0
SUM OF ALL RESPONSES TO PHQ9 QUESTIONS 1 & 2: 0
2. FEELING DOWN, DEPRESSED OR HOPELESS: 0
5. POOR APPETITE OR OVEREATING: 0
6. FEELING BAD ABOUT YOURSELF - OR THAT YOU ARE A FAILURE OR HAVE LET YOURSELF OR YOUR FAMILY DOWN: 0
1. LITTLE INTEREST OR PLEASURE IN DOING THINGS: 0
3. TROUBLE FALLING OR STAYING ASLEEP: 0
SUM OF ALL RESPONSES TO PHQ QUESTIONS 1-9: 0
SUM OF ALL RESPONSES TO PHQ QUESTIONS 1-9: 0
8. MOVING OR SPEAKING SO SLOWLY THAT OTHER PEOPLE COULD HAVE NOTICED. OR THE OPPOSITE, BEING SO FIGETY OR RESTLESS THAT YOU HAVE BEEN MOVING AROUND A LOT MORE THAN USUAL: 0

## 2023-03-14 NOTE — PROGRESS NOTES
3/14/2023    TELEHEALTH EVALUATION -- Audio/Visual (During [place]-75 public health emergency)    HPI:    Macy Clancy (:  1965) has requested an audio/video evaluation for the following concern(s):    Weeks of non improving sinus pain and drainage. Green sputum  Facial tenderness  Non covid  Tried sinus flushing with no relief  Ears squeaking and popping  10 days Zyrtec D with no relief. Still losing weight on Mounjaro. Down to 165 lbs  Stable on serquel    Review of Systems   All other systems reviewed and are negative. Prior to Visit Medications    Medication Sig Taking? Authorizing Provider   amoxicillin-clavulanate (AUGMENTIN) 875-125 MG per tablet Take 1 tablet by mouth 2 times daily for 10 days Yes ALINA Lopez CNP   methylPREDNISolone (MEDROL DOSEPACK) 4 MG tablet Take by mouth.  Yes ALINA Lopez CNP   fluconazole (DIFLUCAN) 150 MG tablet Take 1 tablet by mouth every 72 hours Yes ALINA Stuart CNP   Plecanatide 3 MG TABS Take 3 mg by mouth daily Yes ALINA Saul CNP   metoprolol succinate (TOPROL XL) 25 MG extended release tablet TAKE ONE TABLET BY MOUTH DAILY Yes ALINA Saul CNP   atorvastatin (LIPITOR) 10 MG tablet TAKE 1 TABLET BY MOUTH ONE TIME A DAY Yes Lauren Montey Phalen, APRN - CNP   levothyroxine (SYNTHROID) 88 MCG tablet TAKE 1 TABLET BY MOUTH ONE TIME A DAY Yes ALINA Troncoso CNP   QUEtiapine (SEROQUEL) 25 MG tablet TAKE ONE TABLET BY MOUTH NIGHTLY Yes ALINA Stuart CNP   cyclobenzaprine (FLEXERIL) 10 MG tablet TAKE 1 TABLET BY MOUTH ONE TIME A DAY Yes ALINA Stuart CNP   ondansetron (ZOFRAN-ODT) 4 MG disintegrating tablet Take 1 tablet by mouth every 8 hours as needed for Nausea or Vomiting Yes Lauren Montey Phalen, APRN - CNP   metFORMIN (GLUCOPHAGE-XR) 500 MG extended release tablet Take 1 tablet by mouth daily (with breakfast) Yes ALINA Lopez CNP   fluticasone (FLONASE) 50 MCG/ACT nasal spray 1 spray by Each Nostril route daily Yes Historical Provider, MD       Social History     Tobacco Use    Smoking status: Never    Smokeless tobacco: Never   Vaping Use    Vaping Use: Never used   Substance Use Topics    Alcohol use: Yes     Alcohol/week: 1.0 standard drink     Types: 1 Standard drinks or equivalent per week     Comment: rarely    Drug use: Never        Past Medical History:   Diagnosis Date    Back pain     Depression     Fibromyalgia     Hypertension     Hypertension, essential     Hypothyroidism     Hypothyroidism     IBS (irritable bowel syndrome)     with constipation    Insomnia     Mild reactive airways disease     Muscle cramps     Obesity (BMI 30-39. 9)     Tachycardia        Past Surgical History:   Procedure Laterality Date    DILATION AND CURETTAGE OF UTERUS      HYSTERECTOMY, TOTAL ABDOMINAL (CERVIX REMOVED)      SINUS SURGERY         PHYSICAL EXAMINATION:  [ INSTRUCTIONS:  \"[x]\" Indicates a positive item  \"[]\" Indicates a negative item  -- DELETE ALL ITEMS NOT EXAMINED]  Vital Signs: (As obtained by patient/caregiver or practitioner observation)    Blood pressure-  Heart rate-    Respiratory rate-    Temperature-  Pulse oximetry-     Constitutional: [x] Appears well-developed and well-nourished [x] No apparent distress      [] Abnormal-   Mental status  [x] Alert and awake  [x] Oriented to person/place/time [x]Able to follow commands      Eyes:  EOM    [x]  Normal  [] Abnormal-  Sclera  [x]  Normal  [] Abnormal -         Discharge [x]  None visible  [] Abnormal -    HENT:   [x] Normocephalic, atraumatic.   [] Abnormal   [] Mouth/Throat: Mucous membranes are moist.     External Ears [] Normal  [] Abnormal-     Neck: [x] No visualized mass     Pulmonary/Chest: [x] Respiratory effort normal.  [x] No visualized signs of difficulty breathing or respiratory distress        [] Abnormal-      Musculoskeletal:   [] Normal gait with no signs of ataxia         [] Normal range of motion of neck        [] Abnormal-       Neurological:        [x] No Facial Asymmetry (Cranial nerve 7 motor function) (limited exam to video visit)          [x] No gaze palsy        [] Abnormal-         Skin:        [x] No significant exanthematous lesions or discoloration noted on facial skin         [] Abnormal-            Psychiatric:       [x] Normal Affect [x] No Hallucinations        [] Abnormal-     Other pertinent observable physical exam findings-     ASSESSMENT/PLAN:   Diagnosis Orders   1. Acute bacterial sinusitis  amoxicillin-clavulanate (AUGMENTIN) 875-125 MG per tablet    methylPREDNISolone (MEDROL DOSEPACK) 4 MG tablet    fluconazole (DIFLUCAN) 150 MG tablet      2. Moderate episode of recurrent major depressive disorder (HCC)          No problem-specific Assessment & Plan notes found for this encounter. No follow-ups on file. Jaymie Alvarez is a 65 y.o. female being evaluated by a Virtual Visit (video visit) encounter to address concerns as mentioned above. A caregiver was present when appropriate. Due to this being a TeleHealth encounter (During [facility] public health emergency), evaluation of the following organ systems was limited: Vitals/Constitutional/EENT/Resp/CV/GI//MS/Neuro/Skin/Heme-Lymph-Imm. Pursuant to the emergency declaration under the [address] [place] [address] authority and the [employer] and [employer] Act, this Virtual Visit was conducted with patient's (and/or legal guardian's) consent, to reduce the patient's risk of exposure to COVID-19 and provide necessary medical care. The patient (and/or legal guardian) has also been advised to contact this office for worsening conditions or problems, and seek emergency medical treatment and/or call 911 if deemed necessary.      Patient identification was verified at the start of the visit: yes    Total time spent on this encounter: not billed by time    Services were provided through a video synchronous discussion virtually to substitute for in-person clinic visit. Patient and provider were located at their individual homes. --ALINA Riley CNP on 3/14/2023 at 2:39 PM    An electronic signature was used to authenticate this note.

## 2023-05-09 ENCOUNTER — TELEMEDICINE (OUTPATIENT)
Dept: FAMILY MEDICINE CLINIC | Age: 58
End: 2023-05-09
Payer: COMMERCIAL

## 2023-05-09 DIAGNOSIS — R33.9 INCOMPLETE BLADDER EMPTYING: ICD-10-CM

## 2023-05-09 DIAGNOSIS — N30.01 ACUTE CYSTITIS WITH HEMATURIA: Primary | ICD-10-CM

## 2023-05-09 PROBLEM — J01.90 ACUTE BACTERIAL SINUSITIS: Status: RESOLVED | Noted: 2022-06-03 | Resolved: 2023-05-09

## 2023-05-09 PROBLEM — B96.89 ACUTE BACTERIAL SINUSITIS: Status: RESOLVED | Noted: 2022-06-03 | Resolved: 2023-05-09

## 2023-05-09 PROBLEM — M79.604 PAIN OF RIGHT LOWER EXTREMITY: Status: RESOLVED | Noted: 2021-09-21 | Resolved: 2023-05-09

## 2023-05-09 PROCEDURE — 99213 OFFICE O/P EST LOW 20 MIN: CPT | Performed by: NURSE PRACTITIONER

## 2023-05-09 RX ORDER — CONJUGATED ESTROGENS 0.62 MG/G
0.5 CREAM VAGINAL DAILY
Qty: 30 G | Refills: 2 | Status: SHIPPED | OUTPATIENT
Start: 2023-05-09

## 2023-05-09 RX ORDER — NITROFURANTOIN 25; 75 MG/1; MG/1
100 CAPSULE ORAL 2 TIMES DAILY
Qty: 20 CAPSULE | Refills: 0 | Status: SHIPPED | OUTPATIENT
Start: 2023-05-09 | End: 2023-05-19

## 2023-05-09 NOTE — ASSESSMENT & PLAN NOTE
Macrobid as directed  Will start Premarin pea-sized amount to vaginal area 3 times a week. She is can follow-up with gynecology.

## 2023-05-09 NOTE — PROGRESS NOTES
Smoking status: Never    Smokeless tobacco: Never   Vaping Use    Vaping Use: Never used   Substance Use Topics    Alcohol use: Yes     Alcohol/week: 1.0 standard drink     Types: 1 Standard drinks or equivalent per week     Comment: rarely    Drug use: Never        Past Medical History:   Diagnosis Date    Back pain     Depression     Fibromyalgia     Hypertension     Hypertension, essential     Hypothyroidism     Hypothyroidism     IBS (irritable bowel syndrome)     with constipation    Insomnia     Mild reactive airways disease     Muscle cramps     Obesity (BMI 30-39. 9)     Tachycardia        Past Surgical History:   Procedure Laterality Date    DILATION AND CURETTAGE OF UTERUS      HYSTERECTOMY, TOTAL ABDOMINAL (CERVIX REMOVED)      SINUS SURGERY         PHYSICAL EXAMINATION:  [ INSTRUCTIONS:  \"[x]\" Indicates a positive item  \"[]\" Indicates a negative item  -- DELETE ALL ITEMS NOT EXAMINED]  Vital Signs: (As obtained by patient/caregiver or practitioner observation)    Blood pressure-  Heart rate-    Respiratory rate-    Temperature-  Pulse oximetry-     Constitutional: [x] Appears well-developed and well-nourished [x] No apparent distress      [] Abnormal-   Mental status  [x] Alert and awake  [x] Oriented to person/place/time [x]Able to follow commands      Eyes:  EOM    [x]  Normal  [] Abnormal-  Sclera  [x]  Normal  [] Abnormal -         Discharge [x]  None visible  [] Abnormal -    HENT:   [x] Normocephalic, atraumatic.   [] Abnormal   [] Mouth/Throat: Mucous membranes are moist.     External Ears [] Normal  [] Abnormal-     Neck: [x] No visualized mass     Pulmonary/Chest: [x] Respiratory effort normal.  [x] No visualized signs of difficulty breathing or respiratory distress        [] Abnormal-      Musculoskeletal:   [] Normal gait with no signs of ataxia         [] Normal range of motion of neck        [] Abnormal-       Neurological:        [x] No Facial Asymmetry (Cranial nerve 7 motor function)

## 2023-05-30 ENCOUNTER — PATIENT MESSAGE (OUTPATIENT)
Dept: FAMILY MEDICINE CLINIC | Age: 58
End: 2023-05-30

## 2023-05-30 RX ORDER — SEMAGLUTIDE 1.34 MG/ML
1 INJECTION, SOLUTION SUBCUTANEOUS WEEKLY
Qty: 3 ADJUSTABLE DOSE PRE-FILLED PEN SYRINGE | Refills: 0 | Status: SHIPPED | OUTPATIENT
Start: 2023-05-30

## 2023-05-30 RX ORDER — SEMAGLUTIDE 0.68 MG/ML
1 INJECTION, SOLUTION SUBCUTANEOUS WEEKLY
COMMUNITY
Start: 2023-04-30 | End: 2023-05-30

## 2023-05-30 NOTE — TELEPHONE ENCOUNTER
From: Sam Mosquera  To: David Elias  Sent: 5/30/2023 8:52 AM EDT  Subject: Margy Hardy    My Margy Hardy has been being filled at [facility] with no issue. This weekend they said they could do 1 fill and then it needed to be sent to [facility], which is fine. They then said it needed PA. .. not sure after all of this time? Can we try and send to [facility] and see if they kick back a PA needed or fill it since I have been on it. Actually I need to increase the dose im on- been on it a while. If it comes down to a PA I have 2 co morbidities, started with BMI of 35.  Thanks

## 2023-07-13 ENCOUNTER — OFFICE VISIT (OUTPATIENT)
Dept: FAMILY MEDICINE CLINIC | Age: 58
End: 2023-07-13
Payer: COMMERCIAL

## 2023-07-13 ENCOUNTER — OFFICE VISIT (OUTPATIENT)
Dept: UROGYNECOLOGY | Age: 58
End: 2023-07-13

## 2023-07-13 VITALS
RESPIRATION RATE: 16 BRPM | OXYGEN SATURATION: 97 % | DIASTOLIC BLOOD PRESSURE: 81 MMHG | SYSTOLIC BLOOD PRESSURE: 121 MMHG | TEMPERATURE: 98.4 F | HEART RATE: 76 BPM

## 2023-07-13 DIAGNOSIS — B96.89 ACUTE BACTERIAL SINUSITIS: Primary | ICD-10-CM

## 2023-07-13 DIAGNOSIS — R39.89 URETHRAL PAIN: Primary | ICD-10-CM

## 2023-07-13 DIAGNOSIS — R39.15 URINARY URGENCY: ICD-10-CM

## 2023-07-13 DIAGNOSIS — J01.90 ACUTE BACTERIAL SINUSITIS: Primary | ICD-10-CM

## 2023-07-13 DIAGNOSIS — R35.0 URINARY FREQUENCY: ICD-10-CM

## 2023-07-13 DIAGNOSIS — R39.89 BLADDER PAIN: ICD-10-CM

## 2023-07-13 LAB
BILIRUBIN, POC: NORMAL
BLOOD URINE, POC: NORMAL
CLARITY, POC: CLEAR
COLOR, POC: YELLOW
GLUCOSE URINE, POC: NORMAL
KETONES, POC: NORMAL
LEUKOCYTE EST, POC: NORMAL
NITRITE, POC: NORMAL
PH, POC: 6.5
PROTEIN, POC: NORMAL
SPECIFIC GRAVITY, POC: 1.01
UROBILINOGEN, POC: NORMAL

## 2023-07-13 PROCEDURE — 99214 OFFICE O/P EST MOD 30 MIN: CPT | Performed by: NURSE PRACTITIONER

## 2023-07-13 RX ORDER — HEPARIN SODIUM 10000 [USP'U]/ML
10000 INJECTION, SOLUTION INTRAVENOUS; SUBCUTANEOUS ONCE
Status: COMPLETED | OUTPATIENT
Start: 2023-07-13 | End: 2023-07-13

## 2023-07-13 RX ORDER — CHOLECALCIFEROL (VITAMIN D3) 1MM UNIT/G
LIQUID (GRAM) MISCELLANEOUS
COMMUNITY

## 2023-07-13 RX ORDER — AMOXICILLIN AND CLAVULANATE POTASSIUM 875; 125 MG/1; MG/1
1 TABLET, FILM COATED ORAL 2 TIMES DAILY
Qty: 14 TABLET | Refills: 0 | Status: SHIPPED | OUTPATIENT
Start: 2023-07-13 | End: 2023-07-20

## 2023-07-13 RX ORDER — CYCLOBENZAPRINE HCL 5 MG
5 TABLET ORAL 3 TIMES DAILY PRN
COMMUNITY

## 2023-07-13 RX ORDER — LIDOCAINE HYDROCHLORIDE 20 MG/ML
20 INJECTION, SOLUTION INFILTRATION; PERINEURAL ONCE
Status: COMPLETED | OUTPATIENT
Start: 2023-07-13 | End: 2023-07-13

## 2023-07-13 RX ADMIN — LIDOCAINE HYDROCHLORIDE 20 ML: 20 INJECTION, SOLUTION INFILTRATION; PERINEURAL at 10:34

## 2023-07-13 RX ADMIN — HEPARIN SODIUM 10000 UNITS: 10000 INJECTION, SOLUTION INTRAVENOUS; SUBCUTANEOUS at 10:35

## 2023-07-13 RX ADMIN — Medication 5 MEQ: at 10:33

## 2023-07-14 DIAGNOSIS — B96.89 BV (BACTERIAL VAGINOSIS): Primary | ICD-10-CM

## 2023-07-14 DIAGNOSIS — R25.2 MUSCLE CRAMPS: ICD-10-CM

## 2023-07-14 DIAGNOSIS — N76.0 BV (BACTERIAL VAGINOSIS): Primary | ICD-10-CM

## 2023-07-14 LAB
C TRACH DNA SPEC QL NAA+PROBE: NOT DETECTED
CANDIDA RRNA VAG QL PROBE: NOT DETECTED
CANDIDA RRNA VAG QL PROBE: NOT DETECTED
CARBAPENEM RESISTANCE OXA-48 GENE BY PCR: NOT DETECTED
CEPHALOSPORIN RESISTANCE AMPC GENE: NOT DETECTED
ESBL RESISTANCE: NOT DETECTED
G VAGINALIS RRNA GENITAL QL PROBE: ABNORMAL
M HOMINIS DNA BLD QL NAA+PROBE: NOT DETECTED
MACROLIDE RESISTANCE: ABNORMAL
METHICILLIN RESISTANCE: NOT DETECTED
MYCOPLASMA DNA SPEC QL NAA+PROBE: NOT DETECTED
N GONORRHOEA DNA SPEC QL NAA+PROBE: NOT DETECTED
OTHER MICROORG DNA SPEC QL NAA+PROBE: NOT DETECTED
OTHER MICROORG DNA SPEC QL NAA+PROBE: NOT DETECTED
QUINOLONE AND FLUOROQUINOLONE RESISTANCE: NOT DETECTED
T VAGINALIS RRNA SPEC QL NAA+PROBE: NOT DETECTED
TETRACYCLINE RESISTANCE: ABNORMAL
TRIMETHOPRIM/SULFONAMIDE RESISTANCE: NOT DETECTED

## 2023-07-14 RX ORDER — METRONIDAZOLE 500 MG/1
500 TABLET ORAL 2 TIMES DAILY
Qty: 14 TABLET | Refills: 0 | Status: SHIPPED | OUTPATIENT
Start: 2023-07-14 | End: 2023-07-21

## 2023-07-14 NOTE — RESULT ENCOUNTER NOTE
Notified patient of positive BV and need for treatment, notified her of no alcohol use. Patient verbalized understanding.

## 2023-07-17 RX ORDER — CYCLOBENZAPRINE HCL 10 MG
TABLET ORAL
Qty: 90 TABLET | Refills: 2 | Status: SHIPPED | OUTPATIENT
Start: 2023-07-17

## 2023-07-17 RX ORDER — PLECANATIDE 3 MG/1
TABLET ORAL
Qty: 90 TABLET | Refills: 3 | Status: SHIPPED | OUTPATIENT
Start: 2023-07-17

## 2023-07-17 NOTE — TELEPHONE ENCOUNTER
Requested Prescriptions     Pending Prescriptions Disp Refills    TRULANCE 3 MG TABS [Pharmacy Med Name: Antonia Stanley TABS] 90 tablet 3     Sig: TAKE 1 TABLET BY MOUTH ONE TIME A DAY          Last Office Visit: 7/13/2023     Next Office Visit: Visit date not found     Last Labs:  8/16/2022

## 2023-07-18 ENCOUNTER — OFFICE VISIT (OUTPATIENT)
Dept: UROGYNECOLOGY | Age: 58
End: 2023-07-18
Payer: COMMERCIAL

## 2023-07-18 VITALS
OXYGEN SATURATION: 97 % | DIASTOLIC BLOOD PRESSURE: 85 MMHG | SYSTOLIC BLOOD PRESSURE: 123 MMHG | TEMPERATURE: 98.1 F | HEART RATE: 78 BPM | RESPIRATION RATE: 16 BRPM

## 2023-07-18 DIAGNOSIS — R39.15 URINARY URGENCY: ICD-10-CM

## 2023-07-18 DIAGNOSIS — R39.89 BLADDER PAIN: Primary | ICD-10-CM

## 2023-07-18 DIAGNOSIS — R35.0 URINARY FREQUENCY: ICD-10-CM

## 2023-07-18 PROCEDURE — 81002 URINALYSIS NONAUTO W/O SCOPE: CPT | Performed by: NURSE PRACTITIONER

## 2023-07-18 PROCEDURE — 51700 IRRIGATION OF BLADDER: CPT | Performed by: NURSE PRACTITIONER

## 2023-07-18 RX ORDER — LIDOCAINE HYDROCHLORIDE 20 MG/ML
20 INJECTION, SOLUTION INFILTRATION; PERINEURAL ONCE
Status: COMPLETED | OUTPATIENT
Start: 2023-07-18 | End: 2023-07-18

## 2023-07-18 RX ORDER — HEPARIN SODIUM 10000 [USP'U]/ML
10000 INJECTION, SOLUTION INTRAVENOUS; SUBCUTANEOUS ONCE
Status: COMPLETED | OUTPATIENT
Start: 2023-07-18 | End: 2023-07-18

## 2023-07-18 RX ORDER — METHYLPREDNISOLONE SODIUM SUCCINATE 40 MG/ML
40 INJECTION, POWDER, LYOPHILIZED, FOR SOLUTION INTRAMUSCULAR; INTRAVENOUS ONCE
Status: COMPLETED | OUTPATIENT
Start: 2023-07-18 | End: 2023-07-18

## 2023-07-18 RX ADMIN — Medication 50 MEQ: at 10:20

## 2023-07-18 RX ADMIN — LIDOCAINE HYDROCHLORIDE 20 ML: 20 INJECTION, SOLUTION INFILTRATION; PERINEURAL at 10:20

## 2023-07-18 RX ADMIN — METHYLPREDNISOLONE SODIUM SUCCINATE 40 MG: 40 INJECTION, POWDER, LYOPHILIZED, FOR SOLUTION INTRAMUSCULAR; INTRAVENOUS at 10:20

## 2023-07-18 RX ADMIN — HEPARIN SODIUM 10000 UNITS: 10000 INJECTION, SOLUTION INTRAVENOUS; SUBCUTANEOUS at 10:21

## 2023-07-18 NOTE — PROGRESS NOTES
2023     HPI:     Name: Samy Gallegos  YOB: 1965    CC: Samy Gallegos is a 65 y.o. female who is here for follow up of bladder pain. HPI: How long have you had this problem? years  Please rate the severity of your problem:  mild  Anything make it better? Patient reports she has not had any significant pain since her last instillation. She completed treatment for BV. She reports almost complete resolution of her bladder symptoms for many days after instillation. 2 Result Notes       Component Ref Range & Units    Candida parapsilosis Not Detected Not Detected    Candida spp Not Detected Not Detected    Chlamydia Trachomatis By RT-PCR Not Detected Not Detected    GARDNERELLA VAGINALIS Not Detected Low Abnormal     Lactobacillus Spp.  Not Detected Not Detected    Mycoplasma Genitalium PCR Not Detected Not Detected    Mycoplasma hominis Not Detected Not Detected    Neisseria Gonorrhoeae By RT-PCR Not Detected Not Detected    TRICHOMONAS VAGINALIS Not Detected Not Detected    Ureaplasma urealyticum, PCR Not Detected Not Detected    Carbapenem Resistance OXA-48 gene by PCR Not Detected Not Detected    Cephalosporin Resistance AmpC gene Not Detected Not Detected    ESBL Resistance Not Detected Not Detected    Macrolide Resistance Not Detected High Abnormal     Methicillin Resistance Not Detected Not Detected    Quinolone and Fluoroquinolone Resistance Not Detected Not Detected    Tetracycline Resistance Not Detected High Abnormal     Trimethoprim/Sulfonamide Resistance Not Detected Not Detected    Comment          Ob/Gyn History:    OB History    Para Term  AB Living   2 2 2         SAB IAB Ectopic Molar Multiple Live Births                    # Outcome Date GA Lbr Ruy/2nd Weight Sex Delivery Anes PTL Lv   2 Term            1 Term              Past Medical History:   Past Medical History:   Diagnosis Date    Back pain     Depression     Fibromyalgia     Hypertension Detail Level: Simple

## 2023-08-02 RX ORDER — FLUCONAZOLE 150 MG/1
150 TABLET ORAL ONCE
Qty: 1 TABLET | Refills: 0 | Status: SHIPPED | OUTPATIENT
Start: 2023-08-02 | End: 2023-08-02

## 2023-08-23 ENCOUNTER — PATIENT MESSAGE (OUTPATIENT)
Dept: FAMILY MEDICINE CLINIC | Age: 58
End: 2023-08-23

## 2023-08-23 DIAGNOSIS — Z00.00 ROUTINE GENERAL MEDICAL EXAMINATION AT A HEALTH CARE FACILITY: Primary | ICD-10-CM

## 2023-08-23 NOTE — TELEPHONE ENCOUNTER
From: Shubham Klein  To: Lurdes Piedra  Sent: 8/23/2023 2:04 PM EDT  Subject: Upcoming annual on 8/28    Hi! I am seeing you next Monday.  If you put labs in I can get them done tomorrow so you will have for appointment thanks!!

## 2023-08-24 DIAGNOSIS — Z00.00 ANNUAL PHYSICAL EXAM: Primary | ICD-10-CM

## 2023-08-24 DIAGNOSIS — Z00.00 ANNUAL PHYSICAL EXAM: ICD-10-CM

## 2023-08-24 LAB
ALBUMIN SERPL-MCNC: 4.6 G/DL (ref 3.4–5)
ALBUMIN/GLOB SERPL: 2.2 {RATIO} (ref 1.1–2.2)
ALP SERPL-CCNC: 67 U/L (ref 40–129)
ALT SERPL-CCNC: 13 U/L (ref 10–40)
ANION GAP SERPL CALCULATED.3IONS-SCNC: 8 MMOL/L (ref 3–16)
AST SERPL-CCNC: 17 U/L (ref 15–37)
BASOPHILS # BLD: 0 K/UL (ref 0–0.2)
BASOPHILS NFR BLD: 0.4 %
BILIRUB SERPL-MCNC: 0.3 MG/DL (ref 0–1)
BUN SERPL-MCNC: 10 MG/DL (ref 7–20)
CALCIUM SERPL-MCNC: 9.8 MG/DL (ref 8.3–10.6)
CHLORIDE SERPL-SCNC: 101 MMOL/L (ref 99–110)
CHOLEST SERPL-MCNC: 235 MG/DL (ref 0–199)
CO2 SERPL-SCNC: 31 MMOL/L (ref 21–32)
CREAT SERPL-MCNC: 0.8 MG/DL (ref 0.6–1.1)
DEPRECATED RDW RBC AUTO: 13.9 % (ref 12.4–15.4)
EOSINOPHIL # BLD: 0.1 K/UL (ref 0–0.6)
EOSINOPHIL NFR BLD: 2.1 %
GFR SERPLBLD CREATININE-BSD FMLA CKD-EPI: >60 ML/MIN/{1.73_M2}
GLUCOSE SERPL-MCNC: 85 MG/DL (ref 70–99)
HCT VFR BLD AUTO: 41.1 % (ref 36–48)
HDLC SERPL-MCNC: 46 MG/DL (ref 40–60)
HGB BLD-MCNC: 14.2 G/DL (ref 12–16)
LDLC SERPL CALC-MCNC: 160 MG/DL
LYMPHOCYTES # BLD: 2.6 K/UL (ref 1–5.1)
LYMPHOCYTES NFR BLD: 40.5 %
MCH RBC QN AUTO: 30.4 PG (ref 26–34)
MCHC RBC AUTO-ENTMCNC: 34.4 G/DL (ref 31–36)
MCV RBC AUTO: 88.4 FL (ref 80–100)
MONOCYTES # BLD: 0.3 K/UL (ref 0–1.3)
MONOCYTES NFR BLD: 4.2 %
NEUTROPHILS # BLD: 3.3 K/UL (ref 1.7–7.7)
NEUTROPHILS NFR BLD: 52.8 %
PLATELET # BLD AUTO: 244 K/UL (ref 135–450)
PMV BLD AUTO: 9.6 FL (ref 5–10.5)
POTASSIUM SERPL-SCNC: 4 MMOL/L (ref 3.5–5.1)
PROT SERPL-MCNC: 6.7 G/DL (ref 6.4–8.2)
RBC # BLD AUTO: 4.65 M/UL (ref 4–5.2)
SODIUM SERPL-SCNC: 140 MMOL/L (ref 136–145)
TRIGL SERPL-MCNC: 145 MG/DL (ref 0–150)
TSH SERPL DL<=0.005 MIU/L-ACNC: 1.24 UIU/ML (ref 0.27–4.2)
VLDLC SERPL CALC-MCNC: 29 MG/DL
WBC # BLD AUTO: 6.3 K/UL (ref 4–11)

## 2023-08-25 LAB
EST. AVERAGE GLUCOSE BLD GHB EST-MCNC: 105.4 MG/DL
HBA1C MFR BLD: 5.3 %

## 2023-08-28 ENCOUNTER — OFFICE VISIT (OUTPATIENT)
Dept: FAMILY MEDICINE CLINIC | Age: 58
End: 2023-08-28
Payer: COMMERCIAL

## 2023-08-28 VITALS
TEMPERATURE: 97.1 F | OXYGEN SATURATION: 96 % | HEART RATE: 100 BPM | BODY MASS INDEX: 28.35 KG/M2 | WEIGHT: 155 LBS | SYSTOLIC BLOOD PRESSURE: 110 MMHG | DIASTOLIC BLOOD PRESSURE: 78 MMHG

## 2023-08-28 DIAGNOSIS — R00.0 TACHYCARDIA: ICD-10-CM

## 2023-08-28 DIAGNOSIS — K58.1 IRRITABLE BOWEL SYNDROME WITH CONSTIPATION: ICD-10-CM

## 2023-08-28 DIAGNOSIS — L30.4 INTERTRIGO: ICD-10-CM

## 2023-08-28 DIAGNOSIS — N30.10 INTERSTITIAL CYSTITIS: ICD-10-CM

## 2023-08-28 DIAGNOSIS — E03.9 HYPOTHYROIDISM, UNSPECIFIED TYPE: ICD-10-CM

## 2023-08-28 DIAGNOSIS — F33.1 MODERATE EPISODE OF RECURRENT MAJOR DEPRESSIVE DISORDER (HCC): ICD-10-CM

## 2023-08-28 DIAGNOSIS — I10 ESSENTIAL HYPERTENSION: ICD-10-CM

## 2023-08-28 DIAGNOSIS — Z00.00 ENCOUNTER FOR WELL ADULT EXAM WITHOUT ABNORMAL FINDINGS: Primary | ICD-10-CM

## 2023-08-28 PROBLEM — R33.9 INCOMPLETE BLADDER EMPTYING: Status: RESOLVED | Noted: 2023-05-09 | Resolved: 2023-08-28

## 2023-08-28 PROBLEM — N30.01 ACUTE CYSTITIS WITH HEMATURIA: Status: RESOLVED | Noted: 2023-05-09 | Resolved: 2023-08-28

## 2023-08-28 PROBLEM — F43.23 SITUATIONAL MIXED ANXIETY AND DEPRESSIVE DISORDER: Status: RESOLVED | Noted: 2017-08-11 | Resolved: 2023-08-28

## 2023-08-28 PROCEDURE — 99396 PREV VISIT EST AGE 40-64: CPT | Performed by: NURSE PRACTITIONER

## 2023-08-28 PROCEDURE — 3078F DIAST BP <80 MM HG: CPT | Performed by: NURSE PRACTITIONER

## 2023-08-28 PROCEDURE — 3074F SYST BP LT 130 MM HG: CPT | Performed by: NURSE PRACTITIONER

## 2023-08-28 SDOH — ECONOMIC STABILITY: FOOD INSECURITY: WITHIN THE PAST 12 MONTHS, THE FOOD YOU BOUGHT JUST DIDN'T LAST AND YOU DIDN'T HAVE MONEY TO GET MORE.: NEVER TRUE

## 2023-08-28 SDOH — ECONOMIC STABILITY: FOOD INSECURITY: WITHIN THE PAST 12 MONTHS, YOU WORRIED THAT YOUR FOOD WOULD RUN OUT BEFORE YOU GOT MONEY TO BUY MORE.: NEVER TRUE

## 2023-08-28 SDOH — ECONOMIC STABILITY: HOUSING INSECURITY
IN THE LAST 12 MONTHS, WAS THERE A TIME WHEN YOU DID NOT HAVE A STEADY PLACE TO SLEEP OR SLEPT IN A SHELTER (INCLUDING NOW)?: NO

## 2023-08-28 SDOH — ECONOMIC STABILITY: INCOME INSECURITY: HOW HARD IS IT FOR YOU TO PAY FOR THE VERY BASICS LIKE FOOD, HOUSING, MEDICAL CARE, AND HEATING?: NOT HARD AT ALL

## 2023-08-28 ASSESSMENT — ENCOUNTER SYMPTOMS
RHINORRHEA: 0
SINUS PAIN: 0
ABDOMINAL DISTENTION: 0
ABDOMINAL PAIN: 0
WHEEZING: 0
STRIDOR: 0
COUGH: 0
EYE PAIN: 0
CONSTIPATION: 0
TROUBLE SWALLOWING: 0
NAUSEA: 0
EYE DISCHARGE: 0
EYE ITCHING: 0
CHEST TIGHTNESS: 0
BACK PAIN: 0
EYE REDNESS: 0
DIARRHEA: 0
VOMITING: 0
SINUS PRESSURE: 0
SHORTNESS OF BREATH: 0

## 2023-08-28 NOTE — PROGRESS NOTES
No Known Allergies      Prior to Visit Medications    Medication Sig Taking? Authorizing Provider   linaclotide (LINZESS) 145 MCG capsule Take 1 capsule by mouth every morning (before breakfast) Yes ALINA Stuart CNP   TRULANCE 3 MG TABS TAKE 1 TABLET BY MOUTH ONE TIME A DAY Yes ALINA Stuart CNP   Multiple Vitamin (MULTIVITAMIN ADULT PO) Take by mouth Yes Historical Provider, MD   cyclobenzaprine (FLEXERIL) 5 MG tablet Take 1 tablet by mouth 3 times daily as needed for Muscle spasms Yes Historical Provider, MD   Cholecalciferol (VITAMIN D3) LIQD by Does not apply route Yes Historical Provider, MD   metoprolol succinate (TOPROL XL) 25 MG extended release tablet TAKE ONE TABLET BY MOUTH DAILY Yes ALINA Olivas CNP   levothyroxine (SYNTHROID) 88 MCG tablet TAKE 1 TABLET BY MOUTH ONE TIME A DAY Yes ALINA Troncoso CNP   QUEtiapine (SEROQUEL) 25 MG tablet TAKE ONE TABLET BY MOUTH NIGHTLY Yes ALINA Stuart CNP   fluticasone (FLONASE) 50 MCG/ACT nasal spray 1 spray by Each Nostril route daily Yes Historical Provider, MD         Past Medical History:   Diagnosis Date    Back pain     Depression     Fibromyalgia     Hypertension     Hypertension, essential     Hypothyroidism     Hypothyroidism     IBS (irritable bowel syndrome)     with constipation    Insomnia     Mild reactive airways disease     Muscle cramps     Obesity (BMI 30-39. 9)     Tachycardia        Past Surgical History:   Procedure Laterality Date    DILATION AND CURETTAGE OF UTERUS      HYSTERECTOMY, TOTAL ABDOMINAL (CERVIX REMOVED)      SINUS SURGERY           Family History   Problem Relation Age of Onset    Cancer Mother     Dementia Mother         breast cancer    Breast Cancer Mother     Arthritis Mother     Lung Cancer Mother     Other Father         MVA    Alcohol Abuse Father     Alzheimer's Disease Maternal Grandmother     Heart Failure Maternal Grandfather     Hypertension Maternal

## 2023-08-28 NOTE — PATIENT INSTRUCTIONS
Advance Directives: Care Instructions  Overview  An advance directive is a legal way to state your wishes at the end of your life. It tells your family and your doctor what to do if you can't say what you want. There are two main types of advance directives. You can change them any time your wishes change. Living will. This form tells your family and your doctor your wishes about life support and other treatment. The form is also called a declaration. Medical power of . This form lets you name a person to make treatment decisions for you when you can't speak for yourself. This person is called a health care agent (health care proxy, health care surrogate). The form is also called a durable power of  for health care. If you do not have an advance directive, decisions about your medical care may be made by a family member, or by a doctor or a  who doesn't know you. It may help to think of an advance directive as a gift to the people who care for you. If you have one, they won't have to make tough decisions by themselves. For more information, including forms for your state, see the [address] [place] [address] website (www.caringinfo.org/planning/advance-directives/). Follow-up care is a key part of your treatment and safety. Be sure to make and go to all appointments, and call your doctor if you are having problems. It's also a good idea to know your test results and keep a list of the medicines you take. What should you include in an advance directive? Many states have a unique advance directive form. (It may ask you to address specific issues.) Or you might use a universal form that's approved by many states. If your form doesn't tell you what to address, it may be hard to know what to include in your advance directive. Use the questions below to help you get started. Who do you want to make decisions about your medical care if you are not able to?   What life-support measures do you want if you

## 2023-08-28 NOTE — ASSESSMENT & PLAN NOTE
Lab Results   Component Value Date    TSH 1.90 08/10/2020    TSHREFLEX 2.28 06/17/2021    TSHFT4 1.24 08/24/2023     Thyroid continue Synthroid

## 2023-09-14 NOTE — TELEPHONE ENCOUNTER
Patient called to review urine culture results, Patient aware of treatment and recults. ----- Message from Gauri Delaney MD sent at 10/8/2021  8:49 AM EDT -----  Please treat with macrobid # 14 one PO BID for seven days and then have her follow up with [facility]  for a PRANEETH.     Thanks  Suyapa Yang  ----- Message -----  From: Yulisa Santamaria RN  Sent: 10/5/2021   9:18 AM EDT  To: Gauri Delaney MD 

## 2023-10-03 ENCOUNTER — PATIENT MESSAGE (OUTPATIENT)
Dept: FAMILY MEDICINE CLINIC | Age: 58
End: 2023-10-03

## 2023-10-03 DIAGNOSIS — H91.93 DECREASED HEARING OF BOTH EARS: ICD-10-CM

## 2023-10-03 DIAGNOSIS — Z12.31 ENCOUNTER FOR SCREENING MAMMOGRAM FOR MALIGNANT NEOPLASM OF BREAST: Primary | ICD-10-CM

## 2023-10-24 DIAGNOSIS — R00.0 TACHYCARDIA: ICD-10-CM

## 2023-10-24 DIAGNOSIS — E03.9 HYPOTHYROIDISM, UNSPECIFIED TYPE: ICD-10-CM

## 2023-10-25 RX ORDER — LEVOTHYROXINE SODIUM 88 UG/1
TABLET ORAL
Qty: 90 TABLET | Refills: 2 | Status: SHIPPED | OUTPATIENT
Start: 2023-10-25

## 2023-10-25 RX ORDER — METOPROLOL SUCCINATE 25 MG/1
TABLET, EXTENDED RELEASE ORAL
Qty: 90 TABLET | Refills: 2 | Status: SHIPPED | OUTPATIENT
Start: 2023-10-25

## 2023-10-31 ENCOUNTER — PATIENT MESSAGE (OUTPATIENT)
Dept: FAMILY MEDICINE CLINIC | Age: 58
End: 2023-10-31

## 2023-11-01 RX ORDER — VALACYCLOVIR HYDROCHLORIDE 1 G/1
1000 TABLET, FILM COATED ORAL 3 TIMES DAILY
Qty: 21 TABLET | Refills: 0 | Status: SHIPPED | OUTPATIENT
Start: 2023-11-01 | End: 2023-11-08

## 2023-11-01 NOTE — TELEPHONE ENCOUNTER
From: Mila Tijerina  To: Damon Galan  Sent: 10/31/2023 2:55 PM EDT  Subject: Prescription    Please let me know where you send the valeted medication!  Thank you I can go after work at

## 2023-11-06 ENCOUNTER — OFFICE VISIT (OUTPATIENT)
Dept: FAMILY MEDICINE CLINIC | Age: 58
End: 2023-11-06
Payer: COMMERCIAL

## 2023-11-06 VITALS
BODY MASS INDEX: 29.08 KG/M2 | TEMPERATURE: 97.1 F | OXYGEN SATURATION: 96 % | WEIGHT: 159 LBS | DIASTOLIC BLOOD PRESSURE: 70 MMHG | SYSTOLIC BLOOD PRESSURE: 124 MMHG | HEART RATE: 70 BPM

## 2023-11-06 DIAGNOSIS — Z23 NEED FOR INFLUENZA VACCINATION: ICD-10-CM

## 2023-11-06 DIAGNOSIS — Z23 NEED FOR VACCINATION FOR H FLU TYPE B: ICD-10-CM

## 2023-11-06 DIAGNOSIS — J01.90 ACUTE BACTERIAL SINUSITIS: Primary | ICD-10-CM

## 2023-11-06 DIAGNOSIS — B96.89 ACUTE BACTERIAL SINUSITIS: Primary | ICD-10-CM

## 2023-11-06 PROCEDURE — 3078F DIAST BP <80 MM HG: CPT | Performed by: NURSE PRACTITIONER

## 2023-11-06 PROCEDURE — 99214 OFFICE O/P EST MOD 30 MIN: CPT | Performed by: NURSE PRACTITIONER

## 2023-11-06 PROCEDURE — 90674 CCIIV4 VAC NO PRSV 0.5 ML IM: CPT | Performed by: NURSE PRACTITIONER

## 2023-11-06 PROCEDURE — 3074F SYST BP LT 130 MM HG: CPT | Performed by: NURSE PRACTITIONER

## 2023-11-06 PROCEDURE — 90471 IMMUNIZATION ADMIN: CPT | Performed by: NURSE PRACTITIONER

## 2023-11-06 RX ORDER — AMOXICILLIN AND CLAVULANATE POTASSIUM 875; 125 MG/1; MG/1
1 TABLET, FILM COATED ORAL 2 TIMES DAILY
Qty: 14 TABLET | Refills: 0 | Status: SHIPPED | OUTPATIENT
Start: 2023-11-06 | End: 2023-11-13

## 2023-11-06 RX ORDER — PREDNISONE 20 MG/1
20 TABLET ORAL 2 TIMES DAILY
Qty: 10 TABLET | Refills: 0 | Status: SHIPPED | OUTPATIENT
Start: 2023-11-06 | End: 2023-11-11

## 2023-11-06 NOTE — PROGRESS NOTES
Mila Tijerina (:  1965) is a 63 y.o. female,Established patient, here for evaluation of the following chief complaint(s):  Sinus Problem      ASSESSMENT/PLAN:  1. Acute bacterial sinusitis  -     amoxicillin-clavulanate (AUGMENTIN) 875-125 MG per tablet; Take 1 tablet by mouth 2 times daily for 7 days, Disp-14 tablet, R-0Normal  -     predniSONE (DELTASONE) 20 MG tablet; Take 1 tablet by mouth 2 times daily for 5 days, Disp-10 tablet, R-0Normal      No follow-ups on file. SUBJECTIVE/OBJECTIVE:  Patient presents today with complaints of 2 weeks of sinus congestion and facial pain. She has been trying over-the-counter medications including antihistamines, decongestants, probiotics, essential oils, colloidal silver with no relief.     Current Outpatient Medications   Medication Sig Dispense Refill    amoxicillin-clavulanate (AUGMENTIN) 875-125 MG per tablet Take 1 tablet by mouth 2 times daily for 7 days 14 tablet 0    predniSONE (DELTASONE) 20 MG tablet Take 1 tablet by mouth 2 times daily for 5 days 10 tablet 0    valACYclovir (VALTREX) 1 g tablet Take 1 tablet by mouth 3 times daily for 7 days 21 tablet 0    levothyroxine (SYNTHROID) 88 MCG tablet TAKE ONE TABLET BY MOUTH ONCE A DAY 90 tablet 2    metoprolol succinate (TOPROL XL) 25 MG extended release tablet TAKE ONE TABLET BY MOUTH ONCE A DAY 90 tablet 2    linaclotide (LINZESS) 145 MCG capsule Take 1 capsule by mouth every morning (before breakfast) 90 capsule 0    TRULANCE 3 MG TABS TAKE 1 TABLET BY MOUTH ONE TIME A DAY 90 tablet 3    Multiple Vitamin (MULTIVITAMIN ADULT PO) Take by mouth      cyclobenzaprine (FLEXERIL) 5 MG tablet Take 1 tablet by mouth 3 times daily as needed for Muscle spasms      Cholecalciferol (VITAMIN D3) LIQD by Does not apply route      QUEtiapine (SEROQUEL) 25 MG tablet TAKE ONE TABLET BY MOUTH NIGHTLY 90 tablet 2    fluticasone (FLONASE) 50 MCG/ACT nasal spray 1 spray by Each Nostril route daily       No current

## 2023-11-08 DIAGNOSIS — G47.00 INSOMNIA, UNSPECIFIED TYPE: ICD-10-CM

## 2023-11-09 ENCOUNTER — PATIENT MESSAGE (OUTPATIENT)
Dept: FAMILY MEDICINE CLINIC | Age: 58
End: 2023-11-09

## 2023-11-09 DIAGNOSIS — G47.00 INSOMNIA, UNSPECIFIED TYPE: ICD-10-CM

## 2023-11-09 RX ORDER — QUETIAPINE FUMARATE 25 MG/1
25 TABLET, FILM COATED ORAL NIGHTLY
Qty: 90 TABLET | Refills: 2 | Status: SHIPPED | OUTPATIENT
Start: 2023-11-09

## 2023-11-09 RX ORDER — QUETIAPINE FUMARATE 25 MG/1
25 TABLET, FILM COATED ORAL NIGHTLY
Qty: 90 TABLET | Refills: 2 | Status: SHIPPED | OUTPATIENT
Start: 2023-11-09 | End: 2023-11-09 | Stop reason: SDUPTHER

## 2023-11-09 NOTE — TELEPHONE ENCOUNTER
From: Samy Beam  To: Kira Waldrop  Sent: 11/9/2023 3:43 PM EST  Subject: Seroquel    Dayana lamas, can you send to [facility] please!  Thanks

## 2023-12-12 ENCOUNTER — HOSPITAL ENCOUNTER (OUTPATIENT)
Dept: MAMMOGRAPHY | Age: 58
Discharge: HOME | End: 2023-12-12
Payer: COMMERCIAL

## 2023-12-12 VITALS — HEIGHT: 62 IN | WEIGHT: 159 LBS | BODY MASS INDEX: 29.26 KG/M2

## 2023-12-12 DIAGNOSIS — Z12.31 ENCOUNTER FOR SCREENING MAMMOGRAM FOR MALIGNANT NEOPLASM OF BREAST: ICD-10-CM

## 2023-12-12 PROCEDURE — 77063 BREAST TOMOSYNTHESIS BI: CPT

## 2023-12-27 DIAGNOSIS — N30.01 ACUTE CYSTITIS WITH HEMATURIA: Primary | ICD-10-CM

## 2023-12-27 RX ORDER — NITROFURANTOIN 25; 75 MG/1; MG/1
100 CAPSULE ORAL 2 TIMES DAILY
Qty: 20 CAPSULE | Refills: 0 | Status: SHIPPED | OUTPATIENT
Start: 2023-12-27 | End: 2024-01-06

## 2024-01-08 ENCOUNTER — PROCEDURE VISIT (OUTPATIENT)
Dept: AUDIOLOGY | Age: 59
End: 2024-01-08
Payer: COMMERCIAL

## 2024-01-08 DIAGNOSIS — H90.3 SENSORINEURAL HEARING LOSS (SNHL) OF BOTH EARS: Primary | ICD-10-CM

## 2024-01-08 PROCEDURE — 92567 TYMPANOMETRY: CPT | Performed by: AUDIOLOGIST

## 2024-01-08 PROCEDURE — 92557 COMPREHENSIVE HEARING TEST: CPT | Performed by: AUDIOLOGIST

## 2024-01-08 NOTE — PROGRESS NOTES
Padmaja Sears   1965, 58 y.o. female   [id number]       Referring Provider: Karena Luna APRN - CNP   Referral Type: In an order in Epic    Reason for Visit: Evaluation of suspected change in hearing, tinnitus, or balance.      ADULT AUDIOLOGIC EVALUATION      Padmaja Sears is a 58 y.o. female seen today, 1/8/2024, for an initial audiologic evaluation.      AUDIOLOGIC AND OTHER PERTINENT MEDICAL HISTORY:        Padmaja Sears noted concern for decreased hearing bilaterally, does not notice a difference between her ears, but she did note that when she had her hearing screened in 2020, she did have one ear that was worse than the other; she especially notices difficulty in background noise, noted she did not hear her home alarm going off on the other side of her home; has fullness in her ears, clicking/popping at times, noted history of sinus issues including sinus surgery; some noise exposure from loud speakers at concerts.    Padmaja Sears denied otalgia, otorrhea, dizziness, history of ear surgery, and family history of early onset hearing loss.    IMPRESSIONS:       Today's results are consistent with bilateral sensorineural haring loss with LE>RE asymmetry at 6000 Hz only; both ears with normal middle ear function and excellent word recognition.  Hearing loss is significant enough to result in difficulty understanding speech in at least some listening environments.  [place] [facility] [place]    ASSESSMENT AND FINDINGS:       Otoscopy revealed: Clear ear canals bilaterally      RIGHT EAR:  Hearing Sensitivity: Within normal limits through 1000 Hz steeply sloping to moderate sensorineural hearing loss.  Speech Recognition Threshold: 20 dBHL  Word Recognition: Excellent (100%), based on NU-6 25-word list at 60 dBHL using recorded speech stimuli.    Tympanometry: Normal peak pressure and compliance, Type A tympanogram, consistent with normal

## 2024-01-08 NOTE — PATIENT INSTRUCTIONS
sounds, here are ways to reduce your exposure:  1. Wear hearing protection  Ear plugs and protective ear muffs can be used to reduce the intensity of the sound.  The higher the NRR (noise reduction rating), the better reduction of the intensity of the sound   2. Turn the volume down  When listening to music, turn the volume down, especially when wearing ear buds or headphones.  A good rule of thumb is to not go beyond the middle setting on your device.  If you can't hear someone talking to you from arm's length away, your music may be at a level that it can cause damage.  If someone else can hear your music from 3 feet away, it may also be at a level that it can cause damage.  3. Walk away from the sound  If you do not have the ability to wear hearing protection or turn down the volume of the sound, you should do your best to move away from the source of the sound.    Sound decreases in intensity as we move further from the source. The sound will decrease by 6 dB for every doubling of distance from the sound source.    TYPES OF HEARING PROTECTION    The most common types of hearing protection are protective ear muffs and ear plugs.  Protective ear muffs are commonly found at home improvement or sporting good stores, they can be worn time and time again and are great if you need to take your hearing protection off frequently.  Ear plugs are often made of foam or soft silicone.  The foam ones are designed for one-time use, while silicone ear plugs may be used multiple times.  There are also \"filtered\" ear plugs that help provide even attenuation of the sound across all frequencies.  These are great for listening to music or going to concerts, and allow for better understanding of speech in louder environments.  They can be purchased at music stores or online retailers (search \"Ety Plugs\" or \"filtered ear plugs\"), or custom earmolds can be made with an audiologist.    There are \"custom\" hearing protection devices that

## 2024-01-10 ENCOUNTER — TELEPHONE (OUTPATIENT)
Dept: ENT CLINIC | Age: 59
End: 2024-01-10

## 2024-01-30 ENCOUNTER — PROCEDURE VISIT (OUTPATIENT)
Dept: AUDIOLOGY | Age: 59
End: 2024-01-30

## 2024-01-30 ENCOUNTER — OFFICE VISIT (OUTPATIENT)
Dept: UROGYNECOLOGY | Age: 59
End: 2024-01-30
Payer: COMMERCIAL

## 2024-01-30 VITALS
SYSTOLIC BLOOD PRESSURE: 113 MMHG | DIASTOLIC BLOOD PRESSURE: 75 MMHG | TEMPERATURE: 98 F | HEART RATE: 81 BPM | RESPIRATION RATE: 16 BRPM | OXYGEN SATURATION: 97 %

## 2024-01-30 DIAGNOSIS — R35.0 URINARY FREQUENCY: ICD-10-CM

## 2024-01-30 DIAGNOSIS — H90.3 SENSORINEURAL HEARING LOSS (SNHL) OF BOTH EARS: Primary | ICD-10-CM

## 2024-01-30 DIAGNOSIS — R39.89 BLADDER PAIN: Primary | ICD-10-CM

## 2024-01-30 LAB
BILIRUBIN, POC: NORMAL
BLOOD URINE, POC: NORMAL
CLARITY, POC: CLEAR
COLOR, POC: NORMAL
GLUCOSE URINE, POC: NORMAL
KETONES, POC: NORMAL
LEUKOCYTE EST, POC: NORMAL
NITRITE, POC: NORMAL
PH, POC: 6.5
PROTEIN, POC: NORMAL
SPECIFIC GRAVITY, POC: 1.01
UROBILINOGEN, POC: NORMAL

## 2024-01-30 PROCEDURE — 51700 IRRIGATION OF BLADDER: CPT | Performed by: NURSE PRACTITIONER

## 2024-01-30 PROCEDURE — 3078F DIAST BP <80 MM HG: CPT | Performed by: NURSE PRACTITIONER

## 2024-01-30 PROCEDURE — 99213 OFFICE O/P EST LOW 20 MIN: CPT | Performed by: NURSE PRACTITIONER

## 2024-01-30 PROCEDURE — 3074F SYST BP LT 130 MM HG: CPT | Performed by: NURSE PRACTITIONER

## 2024-01-30 PROCEDURE — NBSRV NON-BILLABLE SERVICE: Performed by: AUDIOLOGIST

## 2024-01-30 PROCEDURE — 81002 URINALYSIS NONAUTO W/O SCOPE: CPT | Performed by: NURSE PRACTITIONER

## 2024-01-30 RX ORDER — HEPARIN SODIUM 10000 [USP'U]/ML
5000 INJECTION, SOLUTION INTRAVENOUS; SUBCUTANEOUS ONCE
Status: DISCONTINUED | OUTPATIENT
Start: 2024-01-30 | End: 2024-01-30

## 2024-01-30 RX ORDER — LIDOCAINE HYDROCHLORIDE 10 MG/ML
20 INJECTION, SOLUTION INFILTRATION; PERINEURAL ONCE
Status: COMPLETED | OUTPATIENT
Start: 2024-01-30 | End: 2024-01-30

## 2024-01-30 RX ORDER — HEPARIN SODIUM 10000 [USP'U]/ML
10000 INJECTION, SOLUTION INTRAVENOUS; SUBCUTANEOUS ONCE
Status: COMPLETED | OUTPATIENT
Start: 2024-01-30 | End: 2024-01-30

## 2024-01-30 RX ADMIN — LIDOCAINE HYDROCHLORIDE 20 ML: 10 INJECTION, SOLUTION INFILTRATION; PERINEURAL at 13:32

## 2024-01-30 RX ADMIN — HEPARIN SODIUM 10000 UNITS: 10000 INJECTION, SOLUTION INTRAVENOUS; SUBCUTANEOUS at 13:35

## 2024-01-30 RX ADMIN — Medication 50 MEQ: at 13:32

## 2024-01-30 NOTE — PATIENT INSTRUCTIONS
alert you to the doorbell, a ringing telephone, or a baby monitor.  Television closed-captioning. This shows the words at the bottom of the screen. Most new TVs can do this.  TTY (text telephone). This lets you type messages back and forth on the telephone instead of talking or listening. These devices are also called TDD. When messages are typed on the keyboard, they are sent over the phone line to a receiving TTY. The message is shown on a monitor.  Use pagers, fax machines, text, and email if it is hard for you to communicate by telephone.  Try to learn a listening technique called speech-reading. It is not lip-reading. You pay attention to people's gestures, expressions, posture, and tone of voice. These clues can help you understand what a person is saying. Face the person you are talking to, and have him or her face you. Make sure the lighting is good. You need to see the other person's face clearly.  Think about counseling if you need help to adjust to your hearing loss.    When should you call for help?  Watch closely for changes in your health, and be sure to contact your doctor if:    You think your hearing is getting worse.     You have new symptoms, such as dizziness or nausea.

## 2024-01-30 NOTE — PROGRESS NOTES
Padmaja Sears  1965.58 y.o. female   [id number]      HEARING AID EVALUATION    Padmaja Sears was seen today, 1/30/2024 , for a Hearing Aid Evaluation following audiologic evaluation on 1/8/24. Patient has no prior history of hearing aid use. Patient does not have an insurance benefit for hearing aids at [facility]. Patient is responsible for 100% of the purchasing price at the time of fitting. Hearing aid benefit worksheet scanned into media tab.      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 She notes difficulty clearly hearing and understanding her patients on the phone/ computer. She also notes difficulty understanding her  and family when in background noise such as a restaurant.    TV: increased volume and use of captions    After a discussion of evaluation results, discussion of levels of technology and prices, and lifestyle assessment. Padmaja Sears has decided to consider the options and contact Audiology when a decision has been made.. Color P4,  power Mx1, S Open KRISHNA dc. Signed medical evaluation waiver and evaluation agreement.    Technology to be considered: [employer] [id number].        Patient cost due at time of fitting: $3900.00     No charge for today's appointment.    Discussed with patient that any portion of the total cost that is not paid by insurance will be the patient's financial responsibility. Estimated insurance coverage is a verification of benefit and not a guarantee.     PATIENT EDUCATION:      - Verbally reviewed benefits and limitations of devices and available features in hearing aids.    - Verbally discussed realistic expectations of hearing aid use     RECOMMENDATIONS:      -

## 2024-01-30 NOTE — PROGRESS NOTES
MOUTH ONE TIME A DAY 90 tablet 3    Multiple Vitamin (MULTIVITAMIN ADULT PO) Take by mouth      cyclobenzaprine (FLEXERIL) 5 MG tablet Take 1 tablet by mouth 3 times daily as needed for Muscle spasms      Cholecalciferol (VITAMIN D3) LIQD by Does not apply route      fluticasone (FLONASE) 50 MCG/ACT nasal spray 1 spray by Each Nostril route daily      linaclotide (LINZESS) 145 MCG capsule Take 1 capsule by mouth every morning (before breakfast) 90 capsule 0     No current facility-administered medications for this visit.     Allergies: No Known Allergies  Social History:   Social History     Socioeconomic History    Marital status:      Spouse name: Not on file    Number of children: Not on file    Years of education: Not on file    Highest education level: Not on file   Occupational History    Occupation: [employer]     Employer: [employer]   Tobacco Use    Smoking status: Never    Smokeless tobacco: Never   Vaping Use    Vaping Use: Never used   Substance and Sexual Activity    Alcohol use: Yes     Alcohol/week: 1.0 standard drink of alcohol     Types: 1 Standard drinks or equivalent per week     Comment: rarely    Drug use: Never    Sexual activity: Yes     Partners: Male     Birth control/protection: Surgical   Other Topics Concern    Not on file   Social History Narrative    Not on file     Social Determinants of Health     Financial Resource Strain: Low Risk  (8/28/2023)    Overall Financial Resource Strain (CARDIA)     Difficulty of Paying Living Expenses: Not hard at all   Food Insecurity: Not on file (8/28/2023)   Transportation Needs: Unknown (8/28/2023)    PRAPARE - Transportation     Lack of Transportation (Medical): Not on file     Lack of Transportation (Non-Medical): No   Physical Activity: Not on file   Stress: Not on file   Social Connections: Not on file   Intimate Partner Violence: Not on file   Housing Stability: Unknown (8/28/2023)    Housing Stability Vital Sign     Unable to Pay for Housing in the

## 2024-02-01 DIAGNOSIS — N39.0 ACUTE UTI: Primary | ICD-10-CM

## 2024-02-01 DIAGNOSIS — N39.0 ACUTE UTI: ICD-10-CM

## 2024-02-01 LAB
BACTERIA UR CULT: ABNORMAL
ORGANISM: ABNORMAL

## 2024-02-01 RX ORDER — PENICILLIN V POTASSIUM 500 MG/1
500 TABLET ORAL 2 TIMES DAILY
Qty: 14 TABLET | Refills: 0 | Status: SHIPPED | OUTPATIENT
Start: 2024-02-01 | End: 2024-02-08

## 2024-02-01 RX ORDER — PENICILLIN V POTASSIUM 500 MG/1
500 TABLET ORAL 2 TIMES DAILY
Qty: 14 TABLET | Refills: 0 | Status: SHIPPED | OUTPATIENT
Start: 2024-02-01 | End: 2024-02-01 | Stop reason: SDUPTHER

## 2024-02-01 NOTE — RESULT ENCOUNTER NOTE
Advised patient of positive culture and that Penicillin has been sent to her pharmacy. Patient requested med to be rerouted to [facility] in [facility] [place]. Informed her this will be changed. She verbalized understanding. Denies any questions or concerns at this time.

## 2024-02-13 ENCOUNTER — PATIENT MESSAGE (OUTPATIENT)
Dept: UROGYNECOLOGY | Age: 59
End: 2024-02-13

## 2024-02-13 RX ORDER — PREDNISONE 20 MG/1
20 TABLET ORAL 2 TIMES DAILY
Qty: 10 TABLET | Refills: 0 | Status: SHIPPED | OUTPATIENT
Start: 2024-02-13 | End: 2024-02-18

## 2024-02-13 RX ORDER — FLUCONAZOLE 150 MG/1
150 TABLET ORAL ONCE
Qty: 1 TABLET | Refills: 0 | Status: SHIPPED | OUTPATIENT
Start: 2024-02-13 | End: 2024-02-13

## 2024-02-13 NOTE — TELEPHONE ENCOUNTER
From: Padmaja Sears  To: Jelena Velasco  Sent: 2/13/2024 6:37 AM EST  Subject: Diflucan    Can I get a prescription for diflucan? The penecillin has caused a yeast infection. Thanks  I use [facility] on [address] [place]  [place] thanks

## 2024-02-16 ENCOUNTER — PATIENT MESSAGE (OUTPATIENT)
Dept: UROGYNECOLOGY | Age: 59
End: 2024-02-16

## 2024-02-16 RX ORDER — FLUCONAZOLE 150 MG/1
150 TABLET ORAL ONCE
Qty: 1 TABLET | Refills: 0 | Status: SHIPPED | OUTPATIENT
Start: 2024-02-16 | End: 2024-02-16

## 2024-02-16 NOTE — TELEPHONE ENCOUNTER
From: Padmaja Sears  To: Jelena Velasco  Sent: 2/16/2024 8:06 AM EST  Subject: Diflucan    Could I get a second diflucan? If does not appear to be gone-penecillin tends to give me issues. Thanks

## 2024-02-18 NOTE — ADDENDUM NOTE
Addended by: Griffin Escobar on: 7/6/2021 11:22 AM     Modules accepted: Orders
hand grasp, leg strength strong and equal bilaterally

## 2024-02-20 ENCOUNTER — PATIENT MESSAGE (OUTPATIENT)
Dept: FAMILY MEDICINE CLINIC | Age: 59
End: 2024-02-20

## 2024-02-20 DIAGNOSIS — L30.4 INTERTRIGO: Primary | ICD-10-CM

## 2024-02-20 RX ORDER — KETOCONAZOLE 20 MG/G
CREAM TOPICAL
Qty: 30 G | Refills: 1 | Status: SHIPPED | OUTPATIENT
Start: 2024-02-20

## 2024-02-20 NOTE — TELEPHONE ENCOUNTER
From: Padmaja Sears  To: Karena Luna  Sent: 2/20/2024 6:28 AM EST  Subject: Rash    Hi, I’ve been exercising more, and I have developed a rash there under my lower belly. That is red. I was wondering if I could get some sort of cream for that. It happens occasionally, especially after a round of antibiotics, and I had to go in penecillin.

## 2024-02-28 ENCOUNTER — OFFICE VISIT (OUTPATIENT)
Dept: SURGERY | Age: 59
End: 2024-02-28
Payer: COMMERCIAL

## 2024-02-28 VITALS
DIASTOLIC BLOOD PRESSURE: 82 MMHG | WEIGHT: 158.2 LBS | TEMPERATURE: 97.9 F | BODY MASS INDEX: 29.11 KG/M2 | HEART RATE: 87 BPM | HEIGHT: 62 IN | RESPIRATION RATE: 16 BRPM | OXYGEN SATURATION: 99 % | SYSTOLIC BLOOD PRESSURE: 131 MMHG

## 2024-02-28 DIAGNOSIS — M79.3 PANNICULITIS: Primary | ICD-10-CM

## 2024-02-28 PROCEDURE — 3075F SYST BP GE 130 - 139MM HG: CPT

## 2024-02-28 PROCEDURE — 3079F DIAST BP 80-89 MM HG: CPT

## 2024-02-28 PROCEDURE — 99204 OFFICE O/P NEW MOD 45 MIN: CPT

## 2024-02-28 NOTE — PROGRESS NOTES
[facility]    Consultation requested by: ELENA Morrison    CC: Body contouring    HPI: 58 y.o. female with a PMHx as delineated below who presents in consultation for body contouring. Patient states since she has lost weight she has noticed hanging skin over abdomen. She states she gets rashes and treats with prescription creams which do not seem to reduce rashes.     Bariatric surgery: No     Max weight (lbs): 202  Lowest weight (lbs): 158  Current (lbs): 158  Weight change in the past 3 months: No  Max BMI: 36.9  Current BMI: 28.94    History of DVT/PE: No  Excess skin cover genitals: Yes    Previous body contouring procedures: No   Smoking: No      Pregnancy / Miscarriage: 3/1    Past Medical History:   Diagnosis Date    Back pain     Depression     Fibromyalgia     Hx antineoplastic chemo     Hypertension     Hypertension, essential     Hypothyroidism     Hypothyroidism     IBS (irritable bowel syndrome)     with constipation    Insomnia     Mild reactive airways disease     Muscle cramps     Obesity (BMI 30-39.9)     Tachycardia      Past Surgical History:   Procedure Laterality Date    DILATION AND CURETTAGE OF UTERUS      HYSTERECTOMY, TOTAL ABDOMINAL (CERVIX REMOVED)      SINUS SURGERY       Social History     Socioeconomic History    Marital status:      Spouse name: Not on file    Number of children: Not on file    Years of education: Not on file    Highest education level: Not on file   Occupational History    Occupation: [employer]     Employer: [employer]   Tobacco Use    Smoking status: Never    Smokeless tobacco: Never   Vaping Use    Vaping Use: Never used   Substance and Sexual Activity    Alcohol use: Yes     Alcohol/week: 1.0 standard drink of alcohol     Types: 1 Standard drinks or equivalent per week     Comment: rarely    Drug use: Never    Sexual activity: Yes     Partners: Male     Birth control/protection: Surgical   Other Topics Concern    Not on file

## 2024-02-29 ENCOUNTER — TELEPHONE (OUTPATIENT)
Dept: SURGERY | Age: 59
End: 2024-02-29

## 2024-02-29 ENCOUNTER — PREP FOR PROCEDURE (OUTPATIENT)
Dept: SURGERY | Age: 59
End: 2024-02-29

## 2024-02-29 RX ORDER — SODIUM CHLORIDE 0.9 % (FLUSH) 0.9 %
5-40 SYRINGE (ML) INJECTION EVERY 12 HOURS SCHEDULED
OUTPATIENT
Start: 2024-02-29

## 2024-02-29 RX ORDER — SODIUM CHLORIDE 0.9 % (FLUSH) 0.9 %
5-40 SYRINGE (ML) INJECTION PRN
OUTPATIENT
Start: 2024-02-29

## 2024-02-29 RX ORDER — SODIUM CHLORIDE, SODIUM LACTATE, POTASSIUM CHLORIDE, CALCIUM CHLORIDE 600; 310; 30; 20 MG/100ML; MG/100ML; MG/100ML; MG/100ML
INJECTION, SOLUTION INTRAVENOUS CONTINUOUS
OUTPATIENT
Start: 2024-02-29

## 2024-02-29 RX ORDER — SODIUM CHLORIDE 9 MG/ML
INJECTION, SOLUTION INTRAVENOUS PRN
OUTPATIENT
Start: 2024-02-29

## 2024-02-29 NOTE — TELEPHONE ENCOUNTER
The patient was in the office to see Ashley yesterday.    PLAN: Would benefit from a panniculectomy with in conjunction extended abdominoplasty at the same time. We will submit the panniculectomy to insurance and will provide cosmetic pricing. We will work to schedule.      I received a surgery letter.     I spoke with the patient at the home number listed to discuss needed medical records in regards to rashes and treatment. The patient believes that documentation is in her chart from August with Cheryl. The patient was provided an insurance update.     I spoke with Rahel PALACIO at [facility] ([phone]) to see if CPT Code 14266 requires pre certification. The code does require pre certification, which I started during our call. I will submit clinicals to [facility] today via the provider portal. The clinicals are saved on my computer.      Date Range 4-1-2024 to     Pending Case ID # [id number]     MISCABD     I will leave this phone note open.

## 2024-03-01 NOTE — TELEPHONE ENCOUNTER
The patient called and spoke with [employer] and was told that CPT Code 53990 is not covered for anyone with our ([facility]) plan for any reason. The patient  and I discussed cosmetic pricing and she would like to know if she needs both a panniculectomy and an extended abdominoplasty.     Please advise and I will then call the patient with a plan and pricing.    I will route to MD.

## 2024-03-01 NOTE — TELEPHONE ENCOUNTER
I received a fax from [employer] dated 3-1-2024. I will scan the fax into Epic under the media tab.     CPT Code 17283    Requested service is an EXCLUSION per [employer]. Please contact the Benefits Department for coverage information.     I spoke with the patient at the home number listed. We dicussed the EXCLUSION and I will fax the information to the patient today. She will reach out to the benefits department and will let me know what she finds out.    I will leave this phone note open.

## 2024-03-04 NOTE — TELEPHONE ENCOUNTER
I spoke with the patient at the home number listed to discuss the message from Dr. Foster below.     Physician Fee $4520.00  Hospital Fee $4900.00  Total $9420.00  The cost does not include anesthesia. The patient was provided the name and number for the anesthesia company.     The patient was provided the cosmetic pricing above. She is not in a hurry and will decide what is best for her. She will reach out to us with additional questions or to let us know if or how she would like to move forward.     I will leave this phone note open.

## 2024-03-04 NOTE — TELEPHONE ENCOUNTER
Will communicate this with administration to see if there are options.  If she has an extended abdominoplasty from a cosmetic standpoint, then she will not require to have a panniculectomy as that would be included.    Thanks,  NK

## 2024-03-07 ENCOUNTER — PATIENT MESSAGE (OUTPATIENT)
Dept: FAMILY MEDICINE CLINIC | Age: 59
End: 2024-03-07

## 2024-03-07 RX ORDER — PIMECROLIMUS 10 MG/G
CREAM TOPICAL
Qty: 60 G | Refills: 0 | Status: SHIPPED | OUTPATIENT
Start: 2024-03-07

## 2024-03-07 NOTE — TELEPHONE ENCOUNTER
From: Padmaja Sears  To: Karena Luna  Sent: 3/7/2024 3:13 PM EST  Subject: Eye    I woke up this morning with eczema around my right eye. There is some swelling. It burns like I burned it on something but I didn’t die put some 1% hydrocortisone on and I’ve kept an aquaphor on it all day. Is there something we could do for it like Elidel etc.? I’m going to [place] tomorrow for the weekend to see my daughter. I’m attaching a picture.

## 2024-03-13 ENCOUNTER — TELEMEDICINE (OUTPATIENT)
Dept: FAMILY MEDICINE CLINIC | Age: 59
End: 2024-03-13
Payer: COMMERCIAL

## 2024-03-13 DIAGNOSIS — J40 BRONCHITIS: Primary | ICD-10-CM

## 2024-03-13 DIAGNOSIS — F33.1 MODERATE EPISODE OF RECURRENT MAJOR DEPRESSIVE DISORDER (HCC): ICD-10-CM

## 2024-03-13 PROCEDURE — 99214 OFFICE O/P EST MOD 30 MIN: CPT | Performed by: NURSE PRACTITIONER

## 2024-03-13 RX ORDER — BENZONATATE 100 MG/1
100 CAPSULE ORAL 3 TIMES DAILY PRN
Qty: 30 CAPSULE | Refills: 0 | Status: SHIPPED | OUTPATIENT
Start: 2024-03-13 | End: 2024-03-23

## 2024-03-13 RX ORDER — METHYLPREDNISOLONE 4 MG/1
TABLET ORAL
Qty: 1 KIT | Refills: 0 | Status: SHIPPED | OUTPATIENT
Start: 2024-03-13

## 2024-03-13 RX ORDER — LEVALBUTEROL INHALATION SOLUTION 0.63 MG/3ML
0.63 SOLUTION RESPIRATORY (INHALATION) EVERY 4 HOURS PRN
Qty: 120 EACH | Refills: 2 | Status: SHIPPED | OUTPATIENT
Start: 2024-03-13

## 2024-03-13 RX ORDER — DOXYCYCLINE HYCLATE 100 MG
100 TABLET ORAL 2 TIMES DAILY
Qty: 20 TABLET | Refills: 0 | Status: SHIPPED | OUTPATIENT
Start: 2024-03-13 | End: 2024-03-23

## 2024-03-13 ASSESSMENT — PATIENT HEALTH QUESTIONNAIRE - PHQ9
9. THOUGHTS THAT YOU WOULD BE BETTER OFF DEAD, OR OF HURTING YOURSELF: 0
SUM OF ALL RESPONSES TO PHQ QUESTIONS 1-9: 0
4. FEELING TIRED OR HAVING LITTLE ENERGY: 0
6. FEELING BAD ABOUT YOURSELF - OR THAT YOU ARE A FAILURE OR HAVE LET YOURSELF OR YOUR FAMILY DOWN: 0
7. TROUBLE CONCENTRATING ON THINGS, SUCH AS READING THE NEWSPAPER OR WATCHING TELEVISION: 0
8. MOVING OR SPEAKING SO SLOWLY THAT OTHER PEOPLE COULD HAVE NOTICED. OR THE OPPOSITE, BEING SO FIGETY OR RESTLESS THAT YOU HAVE BEEN MOVING AROUND A LOT MORE THAN USUAL: 0
3. TROUBLE FALLING OR STAYING ASLEEP: 0
SUM OF ALL RESPONSES TO PHQ9 QUESTIONS 1 & 2: 0
SUM OF ALL RESPONSES TO PHQ QUESTIONS 1-9: 0
5. POOR APPETITE OR OVEREATING: 0
2. FEELING DOWN, DEPRESSED OR HOPELESS: 0
10. IF YOU CHECKED OFF ANY PROBLEMS, HOW DIFFICULT HAVE THESE PROBLEMS MADE IT FOR YOU TO DO YOUR WORK, TAKE CARE OF THINGS AT HOME, OR GET ALONG WITH OTHER PEOPLE: 0
1. LITTLE INTEREST OR PLEASURE IN DOING THINGS: 0
SUM OF ALL RESPONSES TO PHQ QUESTIONS 1-9: 0
SUM OF ALL RESPONSES TO PHQ QUESTIONS 1-9: 0

## 2024-03-13 NOTE — ASSESSMENT & PLAN NOTE
Meds as directed, hold off on atbx until increased or ongoing fever or worsening after 7 days total.  Follow up as needed

## 2024-03-13 NOTE — PROGRESS NOTES
COVID-19 and provide necessary medical care.  The patient (and/or legal guardian) has also been advised to contact this office for worsening conditions or problems, and seek emergency medical treatment and/or call 911 if deemed necessary.     Patient identification was verified at the start of the visit: yes    Total time spent on this encounter: not billed by time    Services were provided through a video synchronous discussion virtually to substitute for in-person clinic visit. Patient and provider were located at their individual homes.    --ALINA URIBE - CNP on 3/13/2024 at 3:52 PM    An electronic signature was used to authenticate this note.

## 2024-03-15 NOTE — TELEPHONE ENCOUNTER
I received a letter from [facility] dated 3-7-2024. I will scan the letter into Epic under the media tab.    DENIED  Reference # [id number]  CPT Code 89309  The health plan may cover this treatment if there is documentation of a grade 2 panniculus. The notes document a grade 1 panniculus.     I spoke with Maggie UGALDE at [facility] ([phone]) to schedule a peer to peer. The peer to peer is scheduled with Dr. Terrence Sotelo 3- 2 pm. The physician will call our office.     I will leave this phone note open.

## 2024-03-18 NOTE — TELEPHONE ENCOUNTER
Dr. Foster had a peer to peer today with Dr. Terrence Sotelo. The case remains DENIED due to grade 1 panniculus.     I lmom for the patient at the home number listed. I requested a call back to provide an insurance update.     I will leave this phone note open.

## 2024-03-19 ENCOUNTER — PATIENT MESSAGE (OUTPATIENT)
Dept: FAMILY MEDICINE CLINIC | Age: 59
End: 2024-03-19

## 2024-03-19 RX ORDER — ALBUTEROL SULFATE 90 UG/1
2 AEROSOL, METERED RESPIRATORY (INHALATION) 4 TIMES DAILY PRN
Qty: 18 G | Refills: 0 | Status: SHIPPED | OUTPATIENT
Start: 2024-03-19

## 2024-03-19 NOTE — TELEPHONE ENCOUNTER
The patient returned my call mentioned below. I provided the insurance decision below. The patient has been provided cosmetic pricing previously and is trying to decide how to move forward. I will leave this phone note open for a bit in case I hear back from the patient.     I will leave this phone note open.

## 2024-03-19 NOTE — TELEPHONE ENCOUNTER
From: Padmaja Sears  To: Karena Luna  Sent: 3/19/2024 9:57 AM EDT  Subject: Inhaler    Hi I was wondering if I could get an albuterol inhaler? I used to have one for whenever I got these bronchial things and I haven’t had a bronchial problem for five years and don’t have an inhaler my coughs pretty deep and sometimes I just feel like I need the inhaler.

## 2024-04-17 DIAGNOSIS — G47.00 INSOMNIA, UNSPECIFIED TYPE: ICD-10-CM

## 2024-04-17 RX ORDER — QUETIAPINE FUMARATE 25 MG/1
25 TABLET, FILM COATED ORAL NIGHTLY
Qty: 90 TABLET | Refills: 2 | Status: SHIPPED | OUTPATIENT
Start: 2024-04-17

## 2024-04-18 DIAGNOSIS — G47.00 INSOMNIA, UNSPECIFIED TYPE: ICD-10-CM

## 2024-04-18 RX ORDER — QUETIAPINE FUMARATE 25 MG/1
25 TABLET, FILM COATED ORAL NIGHTLY
Qty: 90 TABLET | Refills: 2 | OUTPATIENT
Start: 2024-04-18

## 2024-04-23 NOTE — TELEPHONE ENCOUNTER
I have not received any feedback from the patient as of today, so I will close this phone note.     I will remove the surgery letter from the que.    I will close this phone note.

## 2024-05-11 ENCOUNTER — TELEPHONE (OUTPATIENT)
Dept: FAMILY MEDICINE CLINIC | Age: 59
End: 2024-05-11

## 2024-05-11 DIAGNOSIS — L30.9 DERMATITIS: Primary | ICD-10-CM

## 2024-05-11 RX ORDER — METHYLPREDNISOLONE 4 MG/1
TABLET ORAL
Qty: 1 KIT | Refills: 0 | Status: SHIPPED | OUTPATIENT
Start: 2024-05-11

## 2024-05-15 NOTE — TELEPHONE ENCOUNTER
Patient called for a prescription for Medrol Dosepak as OTC antihistamine and topical treatments did not work for her dermatitis.

## 2024-05-20 ENCOUNTER — TELEMEDICINE (OUTPATIENT)
Dept: FAMILY MEDICINE CLINIC | Age: 59
End: 2024-05-20
Payer: COMMERCIAL

## 2024-05-20 DIAGNOSIS — L50.9 URTICARIA: Primary | ICD-10-CM

## 2024-05-20 PROCEDURE — 99213 OFFICE O/P EST LOW 20 MIN: CPT | Performed by: NURSE PRACTITIONER

## 2024-05-20 NOTE — PROGRESS NOTES
Appears well-developed and well-nourished [x] No apparent distress      [] Abnormal-   Mental status  [x] Alert and awake  [x] Oriented to person/place/time [x]Able to follow commands      Eyes:  EOM    [x]  Normal  [] Abnormal-  Sclera  [x]  Normal  [] Abnormal -         Discharge [x]  None visible  [] Abnormal -    HENT:   [x] Normocephalic, atraumatic.  [] Abnormal   [] Mouth/Throat: Mucous membranes are moist.     External Ears [] Normal  [] Abnormal-     Neck: [x] No visualized mass     Pulmonary/Chest: [x] Respiratory effort normal.  [x] No visualized signs of difficulty breathing or respiratory distress        [] Abnormal-      Musculoskeletal:   [] Normal gait with no signs of ataxia         [] Normal range of motion of neck        [] Abnormal-       Neurological:        [x] No Facial Asymmetry (Cranial nerve 7 motor function) (limited exam to video visit)          [x] No gaze palsy        [] Abnormal-         Skin:        [x] No significant exanthematous lesions or discoloration noted on facial skin         [] Abnormal-            Psychiatric:       [x] Normal Affect [x] No Hallucinations        [] Abnormal-     Other pertinent observable physical exam findings-     ASSESSMENT/PLAN:   Diagnosis Orders   1. Urticaria          Urticaria  New problem-uncertain diagnosis.  Suspect she has come in contact with something that she has developed a sensitivity to.  Encouraged her to decrease preservatives and gluten from her diet, she is to take Allegra or Zyrtec 3 times daily in addition to an H2 blocker.  I like her to follow-up with me next week with her progress.    No follow-ups on file.    Padmaja Sears is a 58 y.o. female being evaluated by a Virtual Visit (video visit) encounter to address concerns as mentioned above.  A caregiver was present when appropriate. Due to this being a TeleHealth encounter (During COVID-19 public health emergency), evaluation of the following organ systems was limited:

## 2024-05-20 NOTE — ASSESSMENT & PLAN NOTE
New problem-uncertain diagnosis.  Suspect she has come in contact with something that she has developed a sensitivity to.  Encouraged her to decrease preservatives and gluten from her diet, she is to take Allegra or Zyrtec 3 times daily in addition to an H2 blocker.  I like her to follow-up with me next week with her progress.

## 2024-06-03 RX ORDER — CIPROFLOXACIN 500 MG/1
500 TABLET, FILM COATED ORAL 2 TIMES DAILY
Qty: 14 TABLET | Refills: 0 | Status: SHIPPED | OUTPATIENT
Start: 2024-06-03 | End: 2024-06-10

## 2024-06-04 ENCOUNTER — TELEPHONE (OUTPATIENT)
Dept: UROGYNECOLOGY | Age: 59
End: 2024-06-04

## 2024-06-04 NOTE — TELEPHONE ENCOUNTER
[facility] cl[facility] to schedule [facility]t. Left a VM advising pt to call back to schedule appt.

## 2024-06-11 ENCOUNTER — OFFICE VISIT (OUTPATIENT)
Dept: UROGYNECOLOGY | Age: 59
End: 2024-06-11

## 2024-06-11 VITALS
HEART RATE: 62 BPM | SYSTOLIC BLOOD PRESSURE: 121 MMHG | RESPIRATION RATE: 18 BRPM | OXYGEN SATURATION: 98 % | DIASTOLIC BLOOD PRESSURE: 81 MMHG | TEMPERATURE: 98 F

## 2024-06-11 DIAGNOSIS — N39.0 RECURRENT UTI: ICD-10-CM

## 2024-06-11 DIAGNOSIS — R39.89 BLADDER PAIN: Primary | ICD-10-CM

## 2024-06-11 DIAGNOSIS — N30.10 IC (INTERSTITIAL CYSTITIS): ICD-10-CM

## 2024-06-11 RX ORDER — NITROFURANTOIN 25; 75 MG/1; MG/1
CAPSULE ORAL
COMMUNITY
Start: 2024-04-25

## 2024-06-11 RX ORDER — PROGESTERONE 200 MG/1
200 CAPSULE ORAL NIGHTLY
COMMUNITY
Start: 2024-04-17

## 2024-06-11 RX ORDER — LIDOCAINE HYDROCHLORIDE 20 MG/ML
20 INJECTION, SOLUTION INFILTRATION; PERINEURAL ONCE
Status: COMPLETED | OUTPATIENT
Start: 2024-06-11 | End: 2024-06-11

## 2024-06-11 RX ORDER — ESTRADIOL 0.1 MG/G
0.25 CREAM VAGINAL DAILY
Qty: 30 G | Refills: 2 | Status: SHIPPED | OUTPATIENT
Start: 2024-06-11

## 2024-06-11 RX ORDER — HEPARIN SODIUM 10000 [USP'U]/ML
10000 INJECTION, SOLUTION INTRAVENOUS; SUBCUTANEOUS ONCE
Status: COMPLETED | OUTPATIENT
Start: 2024-06-11 | End: 2024-06-11

## 2024-06-11 RX ADMIN — Medication 5 MEQ: at 14:18

## 2024-06-11 RX ADMIN — LIDOCAINE HYDROCHLORIDE 20 ML: 20 INJECTION, SOLUTION INFILTRATION; PERINEURAL at 14:19

## 2024-06-11 RX ADMIN — HEPARIN SODIUM 10000 UNITS: 10000 INJECTION, SOLUTION INTRAVENOUS; SUBCUTANEOUS at 14:17

## 2024-06-11 NOTE — PROGRESS NOTES
2024       HPI:     Name: Padmaja Sears  YOB: 1965    CC: Patient is a 58 y.o. presenting for evaluation of bladder pain, recurrent UTIs vs IC.    HPI:   Patient reports urinary frequency, pelvic pressure/discomfort x1 week. Prescribed Cipro BID for 7 days by her PCP on 6/3/2024 - feels as though symptoms have not subsided.     End of 2024 - positive at home urine test, prescribed Macrobid BID x 10 days.     Last positive culture on 2024 - treated with PCN.  Organism Strep agalactiae (Beta Strep Group B) Abnormal    Urine Culture, Routine <10,000 CFU/ml  Susceptibility testing of penicillin and other beta lactams is  not necessary for beta hemolytic Streptococci since resistant  strains have not been identified.     How long have you had this problem?  years  Please rate the severity of your problem: moderate    Anything make it better?  Continues taking daily cranberry/D-Mannose.   Prescribed Premarin by PCP last May, denies using this and requested clarification on use/dosage instructions today.     Ob/Gyn History:    OB History    Para Term  AB Living   2 2 2         SAB IAB Ectopic Molar Multiple Live Births             2      # Outcome Date GA Lbr Ruy/2nd Weight Sex Delivery Anes PTL Lv   2 Term            1 Term              Past Medical History:   Past Medical History:   Diagnosis Date    Back pain     Depression     Fibromyalgia     Hx antineoplastic chemo     Hypertension     Hypertension, essential     Hypothyroidism     Hypothyroidism     IBS (irritable bowel syndrome)     with constipation    Insomnia     Mild reactive airways disease     Muscle cramps     Obesity (BMI 30-39.9)     Tachycardia      Past Surgical History:   Past Surgical History:   Procedure Laterality Date    DILATION AND CURETTAGE OF UTERUS      HYSTERECTOMY, TOTAL ABDOMINAL (CERVIX REMOVED)      SINUS SURGERY       Allergies: No Known Allergies  Current Medications:  Current Outpatient

## 2024-06-14 ENCOUNTER — PATIENT MESSAGE (OUTPATIENT)
Dept: UROGYNECOLOGY | Age: 59
End: 2024-06-14

## 2024-06-14 LAB
BACTERIA UR CULT: ABNORMAL
BACTERIA UR CULT: ABNORMAL
ORGANISM: ABNORMAL
ORGANISM: ABNORMAL

## 2024-06-14 RX ORDER — NITROFURANTOIN 25; 75 MG/1; MG/1
100 CAPSULE ORAL 2 TIMES DAILY
Qty: 10 CAPSULE | Refills: 0 | Status: SHIPPED | OUTPATIENT
Start: 2024-06-14 | End: 2024-06-19

## 2024-06-14 NOTE — TELEPHONE ENCOUNTER
Padmaja,  Because our culture was obtained via catheter we will treat for this colony count, especially in light of your symptoms.  I am sending Macrobid to your pharmacy.  You will take this BID for 5 days.  Hopefully you will feel better quickly!  Please let me know where to send the order so you can get it quickly.  Jelena

## 2024-07-01 ENCOUNTER — PATIENT MESSAGE (OUTPATIENT)
Dept: FAMILY MEDICINE CLINIC | Age: 59
End: 2024-07-01

## 2024-07-01 DIAGNOSIS — R11.0 NAUSEA: ICD-10-CM

## 2024-07-01 RX ORDER — ONDANSETRON 4 MG/1
4 TABLET, ORALLY DISINTEGRATING ORAL EVERY 8 HOURS PRN
Qty: 10 TABLET | Refills: 0 | Status: SHIPPED | OUTPATIENT
Start: 2024-07-01

## 2024-07-01 NOTE — TELEPHONE ENCOUNTER
From: Padmaja Sears  To: Karena Luna  Sent: 7/1/2024 9:41 AM EDT  Subject: Zofran refill    I haven’t needed it for a while, but could I get a refill on the zofran. I have nausea from time to time and need it. Thanks

## 2024-07-08 ENCOUNTER — PATIENT MESSAGE (OUTPATIENT)
Dept: FAMILY MEDICINE CLINIC | Age: 59
End: 2024-07-08

## 2024-07-08 DIAGNOSIS — R11.0 NAUSEA: ICD-10-CM

## 2024-07-08 DIAGNOSIS — G47.00 INSOMNIA, UNSPECIFIED TYPE: ICD-10-CM

## 2024-07-08 RX ORDER — FLUCONAZOLE 150 MG/1
150 TABLET ORAL
Qty: 2 TABLET | Refills: 0 | Status: SHIPPED | OUTPATIENT
Start: 2024-07-08 | End: 2024-07-14

## 2024-07-08 NOTE — TELEPHONE ENCOUNTER
Requested Prescriptions     Pending Prescriptions Disp Refills    QUEtiapine (SEROQUEL) 25 MG tablet 90 tablet 2     Sig: Take 1 tablet by mouth nightly    ondansetron (ZOFRAN-ODT) 4 MG disintegrating tablet 10 tablet 0     Sig: Take 1 tablet by mouth every 8 hours as needed for Nausea or Vomiting          Last Office Visit: 5/20/2024     Next Office Visit: Visit date not found

## 2024-07-08 NOTE — TELEPHONE ENCOUNTER
From: Padmaja Sears  To: Karena Luna  Sent: 7/8/2024 9:33 AM EDT  Subject: Diflucan    Hi! I soaked in some oil for my skin because you’re not been having problems with it, and I think I got a yeast infection from that I was wondering if I can get a Diflucan?

## 2024-07-09 RX ORDER — QUETIAPINE FUMARATE 25 MG/1
25 TABLET, FILM COATED ORAL NIGHTLY
Qty: 90 TABLET | Refills: 2 | Status: SHIPPED | OUTPATIENT
Start: 2024-07-09

## 2024-07-09 RX ORDER — ONDANSETRON 4 MG/1
4 TABLET, ORALLY DISINTEGRATING ORAL EVERY 8 HOURS PRN
Qty: 10 TABLET | Refills: 0 | Status: SHIPPED | OUTPATIENT
Start: 2024-07-09

## 2024-07-29 ENCOUNTER — OFFICE VISIT (OUTPATIENT)
Dept: FAMILY MEDICINE CLINIC | Age: 59
End: 2024-07-29
Payer: COMMERCIAL

## 2024-07-29 VITALS
TEMPERATURE: 96.9 F | SYSTOLIC BLOOD PRESSURE: 134 MMHG | DIASTOLIC BLOOD PRESSURE: 70 MMHG | BODY MASS INDEX: 29.08 KG/M2 | WEIGHT: 159 LBS | HEART RATE: 78 BPM | OXYGEN SATURATION: 97 %

## 2024-07-29 DIAGNOSIS — K59.00 CONSTIPATION, UNSPECIFIED CONSTIPATION TYPE: ICD-10-CM

## 2024-07-29 DIAGNOSIS — N39.3 SUI (STRESS URINARY INCONTINENCE, FEMALE): ICD-10-CM

## 2024-07-29 DIAGNOSIS — H69.92 DYSFUNCTION OF LEFT EUSTACHIAN TUBE: Primary | ICD-10-CM

## 2024-07-29 DIAGNOSIS — I10 ESSENTIAL HYPERTENSION: ICD-10-CM

## 2024-07-29 DIAGNOSIS — R00.0 TACHYCARDIA: ICD-10-CM

## 2024-07-29 PROBLEM — L50.9 URTICARIA: Status: RESOLVED | Noted: 2024-05-20 | Resolved: 2024-07-29

## 2024-07-29 PROBLEM — J40 BRONCHITIS: Status: RESOLVED | Noted: 2024-03-13 | Resolved: 2024-07-29

## 2024-07-29 PROBLEM — L30.4 INTERTRIGO: Status: RESOLVED | Noted: 2023-08-28 | Resolved: 2024-07-29

## 2024-07-29 PROCEDURE — 3078F DIAST BP <80 MM HG: CPT | Performed by: NURSE PRACTITIONER

## 2024-07-29 PROCEDURE — 99214 OFFICE O/P EST MOD 30 MIN: CPT | Performed by: NURSE PRACTITIONER

## 2024-07-29 PROCEDURE — 3075F SYST BP GE 130 - 139MM HG: CPT | Performed by: NURSE PRACTITIONER

## 2024-07-29 PROCEDURE — G2211 COMPLEX E/M VISIT ADD ON: HCPCS | Performed by: NURSE PRACTITIONER

## 2024-07-29 RX ORDER — PREDNISONE 20 MG/1
20 TABLET ORAL 2 TIMES DAILY
Qty: 10 TABLET | Refills: 0 | Status: SHIPPED | OUTPATIENT
Start: 2024-07-29 | End: 2024-08-03

## 2024-07-29 NOTE — ASSESSMENT & PLAN NOTE
Chronic-currently well-controlled on Toprol XL.  Patient tolerating medication well without side effect.

## 2024-07-29 NOTE — ASSESSMENT & PLAN NOTE
Chronic-significantly improved along with resolution of her recurrent UTIs with the use of 2-3 times a week estradiol vaginal gel.  Patient tolerating this well.

## 2024-07-29 NOTE — PROGRESS NOTES
the vaginal opening every night at bedtime for 2 weeks, then decrease to twice weekly. 30 g 2    albuterol sulfate HFA (VENTOLIN HFA) 108 (90 Base) MCG/ACT inhaler Inhale 2 puffs into the lungs 4 times daily as needed for Wheezing 18 g 0    levalbuterol (XOPENEX) 0.63 MG/3ML nebulization Take 3 mLs by nebulization every 4 hours as needed for Wheezing 120 each 2    pimecrolimus (ELIDEL) 1 % cream Apply topically 2 times daily. 60 g 0    ketoconazole (NIZORAL) 2 % cream Apply topically daily. 30 g 1    levothyroxine (SYNTHROID) 88 MCG tablet TAKE ONE TABLET BY MOUTH ONCE A DAY 90 tablet 2    metoprolol succinate (TOPROL XL) 25 MG extended release tablet TAKE ONE TABLET BY MOUTH ONCE A DAY 90 tablet 2    TRULANCE 3 MG TABS TAKE 1 TABLET BY MOUTH ONE TIME A DAY 90 tablet 3    Multiple Vitamin (MULTIVITAMIN ADULT PO) Take by mouth      Cholecalciferol (VITAMIN D3) LIQD by Does not apply route      fluticasone (FLONASE) 50 MCG/ACT nasal spray 1 spray by Each Nostril route daily       No current facility-administered medications for this visit.     MEDICATION LIST REVIEWED AND UPDATED AT TIME OF VISIT    Review of Systems   All other systems reviewed and are negative.      Vitals:    07/29/24 1321   BP: 134/70   Pulse: 78   Temp: 96.9 °F (36.1 °C)   SpO2: 97%   Weight: 72.1 kg (159 lb)       Physical Exam  Constitutional:       Appearance: Normal appearance. She is well-developed and normal weight.   HENT:      Head: Normocephalic.      Right Ear: Tympanic membrane normal.      Left Ear: Tympanic membrane normal.      Mouth/Throat:      Mouth: Mucous membranes are moist.   Eyes:      Pupils: Pupils are equal, round, and reactive to light.   Cardiovascular:      Rate and Rhythm: Normal rate.   Pulmonary:      Effort: Pulmonary effort is normal.   Musculoskeletal:         General: Normal range of motion.      Cervical back: Normal range of motion.   Skin:     General: Skin is warm and dry.   Neurological:      Mental Status:

## 2024-07-29 NOTE — ASSESSMENT & PLAN NOTE
Chronic-stable and well-controlled with the daily use of Trulance.  Tolerating medications well without side effect

## 2024-08-14 ENCOUNTER — PATIENT MESSAGE (OUTPATIENT)
Dept: FAMILY MEDICINE CLINIC | Age: 59
End: 2024-08-14

## 2024-08-14 DIAGNOSIS — I10 ESSENTIAL HYPERTENSION: ICD-10-CM

## 2024-08-14 DIAGNOSIS — E03.9 HYPOTHYROIDISM, UNSPECIFIED TYPE: Primary | ICD-10-CM

## 2024-08-16 DIAGNOSIS — I10 ESSENTIAL HYPERTENSION: ICD-10-CM

## 2024-08-16 DIAGNOSIS — E03.9 HYPOTHYROIDISM, UNSPECIFIED TYPE: ICD-10-CM

## 2024-08-16 LAB
ALBUMIN SERPL-MCNC: 4.4 G/DL (ref 3.4–5)
ALBUMIN/GLOB SERPL: 2 {RATIO} (ref 1.1–2.2)
ALP SERPL-CCNC: 57 U/L (ref 40–129)
ALT SERPL-CCNC: 17 U/L (ref 10–40)
ANION GAP SERPL CALCULATED.3IONS-SCNC: 8 MMOL/L (ref 3–16)
AST SERPL-CCNC: 19 U/L (ref 15–37)
BASOPHILS # BLD: 0 K/UL (ref 0–0.2)
BASOPHILS NFR BLD: 0.6 %
BILIRUB SERPL-MCNC: 0.6 MG/DL (ref 0–1)
BUN SERPL-MCNC: 15 MG/DL (ref 7–20)
CALCIUM SERPL-MCNC: 10 MG/DL (ref 8.3–10.6)
CHLORIDE SERPL-SCNC: 102 MMOL/L (ref 99–110)
CHOLEST SERPL-MCNC: 274 MG/DL (ref 0–199)
CO2 SERPL-SCNC: 28 MMOL/L (ref 21–32)
CREAT SERPL-MCNC: 0.9 MG/DL (ref 0.6–1.1)
DEPRECATED RDW RBC AUTO: 13.2 % (ref 12.4–15.4)
EOSINOPHIL # BLD: 0.1 K/UL (ref 0–0.6)
EOSINOPHIL NFR BLD: 2.1 %
GFR SERPLBLD CREATININE-BSD FMLA CKD-EPI: 74 ML/MIN/{1.73_M2}
GLUCOSE SERPL-MCNC: 93 MG/DL (ref 70–99)
HCT VFR BLD AUTO: 42.6 % (ref 36–48)
HDLC SERPL-MCNC: 43 MG/DL (ref 40–60)
HGB BLD-MCNC: 14.2 G/DL (ref 12–16)
LDLC SERPL CALC-MCNC: 201 MG/DL
LYMPHOCYTES # BLD: 2.4 K/UL (ref 1–5.1)
LYMPHOCYTES NFR BLD: 34.3 %
MCH RBC QN AUTO: 30.3 PG (ref 26–34)
MCHC RBC AUTO-ENTMCNC: 33.4 G/DL (ref 31–36)
MCV RBC AUTO: 90.7 FL (ref 80–100)
MONOCYTES # BLD: 0.5 K/UL (ref 0–1.3)
MONOCYTES NFR BLD: 6.6 %
NEUTROPHILS # BLD: 4 K/UL (ref 1.7–7.7)
NEUTROPHILS NFR BLD: 56.4 %
PLATELET # BLD AUTO: 285 K/UL (ref 135–450)
PMV BLD AUTO: 9.4 FL (ref 5–10.5)
POTASSIUM SERPL-SCNC: 4.6 MMOL/L (ref 3.5–5.1)
PROT SERPL-MCNC: 6.6 G/DL (ref 6.4–8.2)
RBC # BLD AUTO: 4.7 M/UL (ref 4–5.2)
SODIUM SERPL-SCNC: 138 MMOL/L (ref 136–145)
TRIGL SERPL-MCNC: 149 MG/DL (ref 0–150)
TSH SERPL DL<=0.005 MIU/L-ACNC: 2.01 UIU/ML (ref 0.27–4.2)
VLDLC SERPL CALC-MCNC: 30 MG/DL
WBC # BLD AUTO: 7.1 K/UL (ref 4–11)

## 2024-08-21 ENCOUNTER — OFFICE VISIT (OUTPATIENT)
Dept: FAMILY MEDICINE CLINIC | Age: 59
End: 2024-08-21
Payer: COMMERCIAL

## 2024-08-21 VITALS
SYSTOLIC BLOOD PRESSURE: 128 MMHG | WEIGHT: 161 LBS | BODY MASS INDEX: 29.63 KG/M2 | HEART RATE: 70 BPM | HEIGHT: 62 IN | DIASTOLIC BLOOD PRESSURE: 70 MMHG | OXYGEN SATURATION: 98 % | TEMPERATURE: 96.9 F

## 2024-08-21 DIAGNOSIS — G47.09 OTHER INSOMNIA: ICD-10-CM

## 2024-08-21 DIAGNOSIS — E03.9 HYPOTHYROIDISM, UNSPECIFIED TYPE: ICD-10-CM

## 2024-08-21 DIAGNOSIS — R00.0 TACHYCARDIA: ICD-10-CM

## 2024-08-21 DIAGNOSIS — N30.10 INTERSTITIAL CYSTITIS: ICD-10-CM

## 2024-08-21 DIAGNOSIS — K58.1 IRRITABLE BOWEL SYNDROME WITH CONSTIPATION: ICD-10-CM

## 2024-08-21 DIAGNOSIS — Z00.00 ENCOUNTER FOR WELL ADULT EXAM WITHOUT ABNORMAL FINDINGS: Primary | ICD-10-CM

## 2024-08-21 DIAGNOSIS — F41.1 GAD (GENERALIZED ANXIETY DISORDER): ICD-10-CM

## 2024-08-21 DIAGNOSIS — N39.3 SUI (STRESS URINARY INCONTINENCE, FEMALE): ICD-10-CM

## 2024-08-21 DIAGNOSIS — F33.1 MODERATE EPISODE OF RECURRENT MAJOR DEPRESSIVE DISORDER (HCC): ICD-10-CM

## 2024-08-21 DIAGNOSIS — I10 ESSENTIAL HYPERTENSION: ICD-10-CM

## 2024-08-21 PROCEDURE — 99214 OFFICE O/P EST MOD 30 MIN: CPT | Performed by: NURSE PRACTITIONER

## 2024-08-21 PROCEDURE — 99396 PREV VISIT EST AGE 40-64: CPT | Performed by: NURSE PRACTITIONER

## 2024-08-21 PROCEDURE — 3074F SYST BP LT 130 MM HG: CPT | Performed by: NURSE PRACTITIONER

## 2024-08-21 PROCEDURE — 3078F DIAST BP <80 MM HG: CPT | Performed by: NURSE PRACTITIONER

## 2024-08-21 ASSESSMENT — ENCOUNTER SYMPTOMS
BACK PAIN: 0
EYE PAIN: 0
EYE REDNESS: 0
WHEEZING: 0
ABDOMINAL PAIN: 0
SHORTNESS OF BREATH: 0
SINUS PRESSURE: 0
COUGH: 0
STRIDOR: 0
ABDOMINAL DISTENTION: 0
RHINORRHEA: 0
SINUS PAIN: 0
VOMITING: 0
NAUSEA: 0
EYE ITCHING: 0
CHEST TIGHTNESS: 0
EYE DISCHARGE: 0
CONSTIPATION: 0
TROUBLE SWALLOWING: 0
DIARRHEA: 0

## 2024-08-21 NOTE — PROGRESS NOTES
Well Adult Note  Name: Padmaja Sears Today’s Date: 2024   MRN: [id number] Sex: Female   Age: 59 y.o. Ethnicity: Non- / Non    : 1965 Race: White (non-)      Padmaja Sears is here for a well adult exam.       Subjective   History:  Patient presents today for annual physical exam.  She has no new complaints at this time.    Review of Systems   Constitutional:  Negative for chills, fatigue and fever.   HENT:  Negative for congestion, ear pain, hearing loss, rhinorrhea, sinus pressure, sinus pain, tinnitus and trouble swallowing.    Eyes:  Negative for pain, discharge, redness and itching.   Respiratory:  Negative for cough, chest tightness, shortness of breath, wheezing and stridor.    Cardiovascular:  Negative for chest pain, palpitations and leg swelling.   Gastrointestinal:  Negative for abdominal distention, abdominal pain, constipation, diarrhea, nausea and vomiting.   Genitourinary:  Negative for difficulty urinating, dysuria, hematuria and urgency.   Musculoskeletal:  Negative for back pain, joint swelling, myalgias and neck pain.   Skin:  Negative for rash and wound.   Neurological:  Negative for dizziness and headaches.       No Known Allergies  Prior to Visit Medications    Medication Sig Taking? Authorizing Provider   QUEtiapine (SEROQUEL) 25 MG tablet Take 1 tablet by mouth nightly Yes Karena Luna APRN - CNP   ondansetron (ZOFRAN-ODT) 4 MG disintegrating tablet Take 1 tablet by mouth every 8 hours as needed for Nausea or Vomiting Yes Karena Luna APRN - CNP   progesterone (PROMETRIUM) 200 MG CAPS capsule Take 1 capsule by mouth at bedtime Yes Provider, MD Glynn   estradiol (ESTRACE VAGINAL) 0.1 MG/GM vaginal cream Place 0.25 g vaginally daily Apply 0.25g with fingertip to the vaginal opening every night at bedtime for 2 weeks, then decrease to twice weekly. Yes Jelena Velasco APRN - CNP   albuterol sulfate HFA (VENTOLIN HFA) 108 (90 Base)

## 2024-10-22 ENCOUNTER — TELEPHONE (OUTPATIENT)
Age: 59
End: 2024-10-22

## 2024-10-22 ENCOUNTER — TELEMEDICINE ON DEMAND (OUTPATIENT)
Age: 59
End: 2024-10-22
Payer: COMMERCIAL

## 2024-10-22 DIAGNOSIS — R82.90 ABNORMAL URINE: Primary | ICD-10-CM

## 2024-10-22 DIAGNOSIS — R30.0 DYSURIA: Primary | ICD-10-CM

## 2024-10-22 DIAGNOSIS — R82.81 PYURIA: ICD-10-CM

## 2024-10-22 DIAGNOSIS — R82.90 ABNORMAL FINDING ON URINALYSIS: Primary | ICD-10-CM

## 2024-10-22 DIAGNOSIS — N30.90 CYSTITIS: ICD-10-CM

## 2024-10-22 LAB
BILIRUBIN, POC: NORMAL
BLOOD URINE, POC: NORMAL
CLARITY, POC: NORMAL
COLOR, POC: YELLOW
GLUCOSE URINE, POC: NORMAL MG/DL
KETONES, POC: NORMAL MG/DL
LEUKOCYTE EST, POC: NORMAL
NITRITE, POC: NORMAL
PH, POC: 6.5
PROTEIN, POC: NORMAL MG/DL
SPECIFIC GRAVITY, POC: 1.01
UROBILINOGEN, POC: 0.2 MG/DL

## 2024-10-22 PROCEDURE — 99213 OFFICE O/P EST LOW 20 MIN: CPT | Performed by: NURSE PRACTITIONER

## 2024-10-22 PROCEDURE — 81002 URINALYSIS NONAUTO W/O SCOPE: CPT | Performed by: NURSE PRACTITIONER

## 2024-10-22 RX ORDER — NITROFURANTOIN 25; 75 MG/1; MG/1
100 CAPSULE ORAL 2 TIMES DAILY
Qty: 10 CAPSULE | Refills: 0 | Status: SHIPPED | OUTPATIENT
Start: 2024-10-22 | End: 2024-10-27

## 2024-10-22 ASSESSMENT — ENCOUNTER SYMPTOMS
VOMITING: 0
NAUSEA: 1

## 2024-10-22 NOTE — TELEPHONE ENCOUNTER
I had the pleasure of seeing Padmaja for an On Demand Virtual Visit today for dysuria. Please assist her with obtaining a POCT UA and urine culture. Thank you!

## 2024-10-22 NOTE — PROGRESS NOTES
Padmaja Sears (:  1965) is a Established patient, here for evaluation of the following:    Dysuria (Dysuria, frequency, pressure, urgency x 3 days)       Assessment & Plan:  Below is the assessment and plan developed based on review of pertinent history, physical exam, labs, studies, and medications.  1. Dysuria  -     POCT Urinalysis no Micro; Future  2. Pyuria  -     POCT Urinalysis no Micro; Future  -     nitrofurantoin, macrocrystal-monohydrate, (MACROBID) 100 MG capsule; Take 1 capsule by mouth 2 times daily for 5 days, Disp-10 capsule, R-0Normal  3. Cystitis  -     nitrofurantoin, macrocrystal-monohydrate, (MACROBID) 100 MG capsule; Take 1 capsule by mouth 2 times daily for 5 days, Disp-10 capsule, R-0Normal  Will treat as indicated by UA. Will call patient with results and update POC.   ADDENDUM: urine positive for leukocytes. Will start macrobid given patient's history and preference. Will call her with the results of the culture and adjust treatment if indicated.    Increase fluids, take medication as prescribed. Avoid spicy, acidic, and caffeinated beverages and foods. Patient was counseled to seek urgent medical attention for fever over 102 not controlled by medication, blood, severe flank pain, inability to urinate or uncontrollable nausea and vomiting. Pt. Verbalized understanding  The patient would benefit from future follow up with their usual PCP. As of the end of their Virtualist Visit today, follow up visit status is as follows: Patient to follow up as previously instructed by PCP    Return if symptoms worsen or fail to improve.    Subjective:   Has been traveling a lot lately and missed a few doses of Estrace. Did a home UA and it showed leuks. No nitrates. Thought she might have a yeast infection so she took a dose of diflucan, feeling a little better. No discharge.    Dysuria   This is a new problem. The current episode started in the past 7 days. The problem has been waxing and

## 2024-10-25 LAB
BACTERIA UR CULT: ABNORMAL
ORGANISM: ABNORMAL

## 2024-10-25 RX ORDER — FLUCONAZOLE 150 MG/1
150 TABLET ORAL ONCE
Qty: 1 TABLET | Refills: 0 | Status: SHIPPED | OUTPATIENT
Start: 2024-10-25 | End: 2024-10-25

## 2024-12-17 DIAGNOSIS — R11.0 NAUSEA: ICD-10-CM

## 2024-12-17 RX ORDER — ONDANSETRON 4 MG/1
4 TABLET, ORALLY DISINTEGRATING ORAL EVERY 8 HOURS PRN
Qty: 10 TABLET | Refills: 0 | Status: SHIPPED | OUTPATIENT
Start: 2024-12-17

## 2024-12-17 NOTE — TELEPHONE ENCOUNTER
Medication:   Requested Prescriptions     Pending Prescriptions Disp Refills    ondansetron (ZOFRAN-ODT) 4 MG disintegrating tablet 10 tablet 0     Sig: Take 1 tablet by mouth every 8 hours as needed for Nausea or Vomiting     Last Filled:      Last appt: 8/21/2024   Next appt: Visit date not found    Last OARRS:        No data to display

## 2024-12-22 DIAGNOSIS — R00.0 TACHYCARDIA: ICD-10-CM

## 2024-12-23 RX ORDER — METOPROLOL SUCCINATE 25 MG/1
TABLET, EXTENDED RELEASE ORAL
Qty: 90 TABLET | Refills: 2 | Status: SHIPPED | OUTPATIENT
Start: 2024-12-23 | End: 2024-12-23 | Stop reason: SDUPTHER

## 2024-12-23 RX ORDER — METOPROLOL SUCCINATE 25 MG/1
TABLET, EXTENDED RELEASE ORAL
Qty: 90 TABLET | Refills: 2 | Status: SHIPPED | OUTPATIENT
Start: 2024-12-23

## 2024-12-23 NOTE — TELEPHONE ENCOUNTER
LOV: 8/21/2024  FOV: N/A    Patient comment: Im concerned i may run out before mail order arrives and i  leave town. Can i get a week worth sent to [facility] in [place]? In addition to sending in mail order- leaving for holidays

## 2025-01-04 DIAGNOSIS — R00.0 TACHYCARDIA: ICD-10-CM

## 2025-01-06 RX ORDER — METOPROLOL SUCCINATE 25 MG/1
TABLET, EXTENDED RELEASE ORAL
Qty: 90 TABLET | Refills: 2 | Status: SHIPPED | OUTPATIENT
Start: 2025-01-06

## 2025-01-29 ENCOUNTER — HOSPITAL ENCOUNTER (OUTPATIENT)
Dept: MAMMOGRAPHY | Age: 60
Discharge: HOME | End: 2025-01-29
Payer: COMMERCIAL

## 2025-01-29 VITALS — WEIGHT: 161 LBS | HEIGHT: 62 IN | BODY MASS INDEX: 29.63 KG/M2

## 2025-01-29 DIAGNOSIS — Z12.31 VISIT FOR SCREENING MAMMOGRAM: ICD-10-CM

## 2025-01-29 PROCEDURE — 77063 BREAST TOMOSYNTHESIS BI: CPT

## 2025-02-04 DIAGNOSIS — R11.0 NAUSEA: ICD-10-CM

## 2025-02-04 RX ORDER — ONDANSETRON 4 MG/1
4 TABLET, ORALLY DISINTEGRATING ORAL EVERY 8 HOURS PRN
Qty: 10 TABLET | Refills: 0 | Status: SHIPPED | OUTPATIENT
Start: 2025-02-04

## 2025-02-04 RX ORDER — ONDANSETRON 4 MG/1
4 TABLET, ORALLY DISINTEGRATING ORAL 3 TIMES DAILY PRN
Qty: 21 TABLET | Refills: 0 | Status: SHIPPED | OUTPATIENT
Start: 2025-02-04

## 2025-02-11 DIAGNOSIS — N30.00 ACUTE CYSTITIS WITHOUT HEMATURIA: Primary | ICD-10-CM

## 2025-02-11 RX ORDER — CIPROFLOXACIN 500 MG/1
500 TABLET, FILM COATED ORAL 2 TIMES DAILY
Qty: 14 TABLET | Refills: 0 | Status: SHIPPED | OUTPATIENT
Start: 2025-02-11 | End: 2025-02-18

## 2025-02-11 NOTE — PROGRESS NOTES
After-hours call per patient.  Sent photo of positive Azo test for UTI.  This is a recurrent condition for patient and she is under the care of urogynecology.  Cipro sent in.  Patient understands use.  Will follow-up as necessary.

## 2025-02-19 ENCOUNTER — PATIENT MESSAGE (OUTPATIENT)
Dept: FAMILY MEDICINE CLINIC | Age: 60
End: 2025-02-19

## 2025-02-19 RX ORDER — FLUCONAZOLE 150 MG/1
150 TABLET ORAL DAILY
Qty: 14 TABLET | Refills: 0 | Status: SHIPPED | OUTPATIENT
Start: 2025-02-19

## 2025-02-26 ENCOUNTER — PATIENT MESSAGE (OUTPATIENT)
Dept: FAMILY MEDICINE CLINIC | Age: 60
End: 2025-02-26

## 2025-02-26 ENCOUNTER — OFFICE VISIT (OUTPATIENT)
Dept: FAMILY MEDICINE CLINIC | Age: 60
End: 2025-02-26

## 2025-02-26 VITALS
DIASTOLIC BLOOD PRESSURE: 78 MMHG | OXYGEN SATURATION: 98 % | HEART RATE: 80 BPM | WEIGHT: 161.2 LBS | TEMPERATURE: 98 F | SYSTOLIC BLOOD PRESSURE: 118 MMHG | BODY MASS INDEX: 29.48 KG/M2

## 2025-02-26 DIAGNOSIS — F33.1 MODERATE EPISODE OF RECURRENT MAJOR DEPRESSIVE DISORDER (HCC): ICD-10-CM

## 2025-02-26 DIAGNOSIS — M79.18 MYALGIA, MULTIPLE SITES: Primary | ICD-10-CM

## 2025-02-26 DIAGNOSIS — M79.18 MYALGIA, MULTIPLE SITES: ICD-10-CM

## 2025-02-26 RX ORDER — DULOXETIN HYDROCHLORIDE 30 MG/1
30 CAPSULE, DELAYED RELEASE ORAL DAILY
Qty: 90 CAPSULE | Refills: 1 | Status: CANCELLED | OUTPATIENT
Start: 2025-02-26

## 2025-02-26 RX ORDER — DULOXETIN HYDROCHLORIDE 30 MG/1
30 CAPSULE, DELAYED RELEASE ORAL DAILY
Qty: 90 CAPSULE | Refills: 1 | Status: SHIPPED | OUTPATIENT
Start: 2025-02-26 | End: 2025-02-27 | Stop reason: SDUPTHER

## 2025-02-26 NOTE — PROGRESS NOTES
Padmaja Sears (:  1965) is a 59 y.o. female,Established patient, here for evaluation of the following chief complaint(s):  Chronic Pain (X 3 days)      ASSESSMENT/PLAN:  Assessment & Plan  1. Fibromyalgia.  She reports experiencing a flare with severe muscle aches and knots, particularly in her legs, which radiate pain. She has been taking ibuprofen 800 mg three times a day and Tylenol without significant relief. She has not been worked up for rheumatologic disorders recently. A referral to Dr. Brooks for a comprehensive rheumatologic evaluation is recommended to rule out any underlying conditions. Cymbalta will be initiated to manage her chronic pain. She is advised to take Cymbalta at bedtime as it may cause drowsiness and help with nighttime pain. She can continue taking Seroquel 25 mg alongside Cymbalta.      1. Myalgia, multiple sites  -     DULoxetine (CYMBALTA) 30 MG extended release capsule; Take 1 capsule by mouth daily, Disp-90 capsule, R-1NHeidi Paz MD, Rheumatology, [facility]  2. Moderate episode of recurrent major depressive disorder (HCC)  -     DULoxetine (CYMBALTA) 30 MG extended release capsule; Take 1 capsule by mouth daily, Disp-90 capsule, R-1NHeidi Paz MD, Rheumatology, [facility]    No follow-ups on file.    SUBJECTIVE/OBJECTIVE:    History of Present Illness  The patient is a 59-year-old female who presents for evaluation of fibromyalgia.    She has been experiencing a flare-up of her fibromyalgia symptoms for several days, characterized by severe muscle aches. She reports the presence of knots in her muscles, particularly in her legs, which cause radiating pain and induce sleepiness. Over the past weekend, she experienced hand aches similar to arthritis, for which she took ibuprofen. She does not have any respiratory symptoms or fever. Her baseline temperature is typically around 97.2 or 97.5, but it has recently

## 2025-02-27 RX ORDER — DULOXETIN HYDROCHLORIDE 30 MG/1
30 CAPSULE, DELAYED RELEASE ORAL DAILY
Qty: 90 CAPSULE | Refills: 1 | Status: SHIPPED | OUTPATIENT
Start: 2025-02-27

## 2025-03-10 ENCOUNTER — TELEMEDICINE (OUTPATIENT)
Dept: FAMILY MEDICINE CLINIC | Age: 60
End: 2025-03-10
Payer: COMMERCIAL

## 2025-03-10 DIAGNOSIS — R00.0 TACHYCARDIA: ICD-10-CM

## 2025-03-10 DIAGNOSIS — U07.1 COVID-19: Primary | ICD-10-CM

## 2025-03-10 PROCEDURE — 99214 OFFICE O/P EST MOD 30 MIN: CPT | Performed by: NURSE PRACTITIONER

## 2025-03-10 PROCEDURE — G2211 COMPLEX E/M VISIT ADD ON: HCPCS | Performed by: NURSE PRACTITIONER

## 2025-03-10 NOTE — PROGRESS NOTES
Padmaja Sears, was evaluated through a synchronous (real-time) audio-video encounter. The patient (or guardian if applicable) is aware that this is a billable service, which includes applicable co-pays. This Virtual Visit was conducted with patient's (and/or legal guardian's) consent. Patient identification was verified, and a caregiver was present when appropriate.   The patient was located at Other: [place], [place]  Provider was located at Facility (Appt Dept): [address] [place] [address]  [address]  [place] [address]  Confirm you are appropriately licensed, registered, or certified to deliver care in the state where the patient is located as indicated above. If you are not or unsure, please re-schedule the visit: Yes, I confirm.     Padmaja Sears (:  1965) is a Established patient, presenting virtually for evaluation of the following:      Below is the assessment and plan developed based on review of pertinent history, physical exam, labs, studies, and medications.     Assessment & Plan  COVID-19   Acute condition, new, Antivirals per orders.    Orders:    nirmatrelvir/ritonavir 300/100 (PAXLOVID) 20 x 150 MG & 10 x 100MG TBPK; Take 3 tablets (two 150 mg nirmatrelvir and one 100 mg ritonavir tablets) by mouth every 12 hours for 5 days.    Tachycardia   Chronic, not at goal (unstable), continue current treatment plan           No follow-ups on file.       Subjective   Patient presents for virtual visit.  States she was diagnosed positive for COVID on Saturday after having spent time with her daughter in [place] over the weekend.  States she has had intense fatigue, headache, sinus congestion.  She is putting over-the-counter's without significant relief.  She is requesting Paxlovid as she has a history of reactive airway disease oftentimes requiring steroids for resolution.      Review of Systems   All other systems reviewed and are negative.         Objective   Patient-Reported Vitals  No data

## 2025-03-20 DIAGNOSIS — E03.9 HYPOTHYROIDISM, UNSPECIFIED TYPE: ICD-10-CM

## 2025-03-20 DIAGNOSIS — G47.00 INSOMNIA, UNSPECIFIED TYPE: ICD-10-CM

## 2025-03-20 RX ORDER — LEVOTHYROXINE SODIUM 88 UG/1
TABLET ORAL
Qty: 90 TABLET | Refills: 2 | Status: SHIPPED | OUTPATIENT
Start: 2025-03-20

## 2025-03-20 RX ORDER — QUETIAPINE FUMARATE 25 MG/1
25 TABLET, FILM COATED ORAL NIGHTLY
Qty: 90 TABLET | Refills: 2 | Status: SHIPPED | OUTPATIENT
Start: 2025-03-20

## 2025-03-20 NOTE — TELEPHONE ENCOUNTER
Medication:   Requested Prescriptions     Pending Prescriptions Disp Refills    levothyroxine (SYNTHROID) 88 MCG tablet 90 tablet 2     Sig: TAKE ONE TABLET BY MOUTH ONCE A DAY     Last Filled:      Last appt: 3/10/2025   Next appt: 3/20/2025

## 2025-04-30 ENCOUNTER — OFFICE VISIT (OUTPATIENT)
Dept: UROGYNECOLOGY | Age: 60
End: 2025-04-30
Payer: COMMERCIAL

## 2025-04-30 ENCOUNTER — PATIENT MESSAGE (OUTPATIENT)
Dept: FAMILY MEDICINE CLINIC | Age: 60
End: 2025-04-30

## 2025-04-30 VITALS
SYSTOLIC BLOOD PRESSURE: 121 MMHG | OXYGEN SATURATION: 98 % | RESPIRATION RATE: 18 BRPM | TEMPERATURE: 98.3 F | DIASTOLIC BLOOD PRESSURE: 80 MMHG | HEART RATE: 78 BPM

## 2025-04-30 DIAGNOSIS — K59.01 SLOW TRANSIT CONSTIPATION: ICD-10-CM

## 2025-04-30 DIAGNOSIS — N81.6 RECTOCELE: ICD-10-CM

## 2025-04-30 DIAGNOSIS — M62.838 LEVATOR SPASM: Primary | ICD-10-CM

## 2025-04-30 PROCEDURE — 3079F DIAST BP 80-89 MM HG: CPT | Performed by: OBSTETRICS & GYNECOLOGY

## 2025-04-30 PROCEDURE — 3074F SYST BP LT 130 MM HG: CPT | Performed by: OBSTETRICS & GYNECOLOGY

## 2025-04-30 PROCEDURE — 99213 OFFICE O/P EST LOW 20 MIN: CPT | Performed by: OBSTETRICS & GYNECOLOGY

## 2025-04-30 NOTE — PROGRESS NOTES
Transportation (Medical): Not on file     Lack of Transportation (Non-Medical): No   Physical Activity: Not on file   Stress: Not on file   Social Connections: Not on file   Intimate Partner Violence: Not on file   Housing Stability: Unknown (8/28/2023)    Housing Stability Vital Sign     Unable to Pay for Housing in the Last Year: Not on file     Number of Places Lived in the Last Year: Not on file     Unstable Housing in the Last Year: No     Family History:   Family History   Problem Relation Age of Onset    Dementia Mother         breast cancer    Breast Cancer Mother 77    Arthritis Mother     Lung Cancer Mother     Cancer Mother     Other Father         MVA    Alcohol Abuse Father     Alzheimer's Disease Maternal Grandmother     Heart Failure Maternal Grandfather     Hypertension Maternal Grandfather     Breast Cancer Maternal Cousin     Breast Cancer Paternal Cousin     Ovarian Cancer Neg Hx          Objective:     Vitals  Vitals:    04/30/25 1323   BP: 121/80   Pulse: 78   Resp: 18   Temp: 98.3 °F (36.8 °C)   SpO2: 98%     Physical Exam  Physical Exam  HENT:      Head: Normocephalic and atraumatic.   Eyes:      Conjunctiva/sclera: Conjunctivae normal.   Pulmonary:      Effort: Pulmonary effort is normal.   Abdominal:      Palpations: Abdomen is soft.   Genitourinary:     Comments: Exam unchanged from previous visit, mild rectocele  Musculoskeletal:      Cervical back: Normal range of motion and neck supple.   Skin:     General: Skin is warm and dry.   Neurological:      Mental Status: She is alert and oriented to person, place, and time.         No results found for this visit on 04/30/25.    Assessment/Plan:     Padmaja Sears is a 59 y.o. female with   1. Levator spasm    2. Slow transit constipation    3. Rectocele    Old records reviewed, outside records reviewed    Padmaja presents today complaining of increased pressure and discomfort that seems to be related to constipation.  She is tried multiple

## 2025-05-06 NOTE — PLAN OF CARE
[facility]- Outpatient Rehabilitation and Therapy [address], [place] [address] office: [phone] fax: [phone]         Physical Therapy Initial Evaluation Certification      Dear Addi Stockton MD,    We had the pleasure of evaluating the following patient for physical therapy services at [facility].  A summary of our findings can be found in the initial assessment below.  This includes our plan of care.  If you have any questions or concerns regarding these findings, please do not hesitate to contact me at the office phone number listed above.  Thank you for the referral.     Physician Signature:_______________________________Date:__________________  By signing above (or electronic signature), therapist’s plan is approved by physician       Physical Therapy: TREATMENT/PROGRESS NOTE   Patient: Padmaja Sears (59 y.o. female)   Examination Date: 2025   :  1965 MRN: [id number]   Visit #: 1   Insurance Allowable Auth Needed   BMN []Yes    [x]No   15% CI Insurance: Payor: [facility] / Plan: [employer] / Product Type: *No Product type* /   Insurance ID: [id number] - (Commercial)  Secondary Insurance (if applicable):    Treatment Diagnosis:     ICD-10-CM    1. Levator spasm  M62.838       2. Constipation due to slow transit  K59.01       3. Pelvic floor dysfunction  M62.89       4. Rectocele  N81.6          Medical Diagnosis:  Levator spasm [M62.838]  Slow transit constipation [K59.01]   Referring Physician: Addi Stockton MD  PCP: Karena Luna, APRN - CNP   Plan of care signed (Y/N):     Date of Patient follow up with Physician:      Progress Report/POC: EVAL today  Progress Report update due: (10 visits /OR AUTH LIMITS/every 30 days, whichever is less)  2025   POC/Re-certification due: (every 90 days) 2025                                            Precautions/ Contra-indications:           Latex allergy:

## 2025-05-14 ENCOUNTER — HOSPITAL ENCOUNTER (OUTPATIENT)
Dept: PHYSICAL THERAPY | Age: 60
Setting detail: THERAPIES SERIES
Discharge: HOME | End: 2025-05-14
Attending: OBSTETRICS & GYNECOLOGY
Payer: COMMERCIAL

## 2025-05-14 DIAGNOSIS — K59.01 CONSTIPATION DUE TO SLOW TRANSIT: ICD-10-CM

## 2025-05-14 DIAGNOSIS — N81.6 RECTOCELE: ICD-10-CM

## 2025-05-14 DIAGNOSIS — M62.838 LEVATOR SPASM: Primary | ICD-10-CM

## 2025-05-14 DIAGNOSIS — M62.89 PELVIC FLOOR DYSFUNCTION: ICD-10-CM

## 2025-05-14 PROCEDURE — 97161 PT EVAL LOW COMPLEX 20 MIN: CPT | Performed by: PHYSICAL THERAPIST

## 2025-05-14 PROCEDURE — 97530 THERAPEUTIC ACTIVITIES: CPT | Performed by: PHYSICAL THERAPIST

## 2025-05-14 PROCEDURE — 97112 NEUROMUSCULAR REEDUCATION: CPT | Performed by: PHYSICAL THERAPIST

## 2025-05-14 NOTE — FLOWSHEET NOTE
[facility]- Outpatient Rehabilitation and Therapy [address], [address], [place] [address] office: [phone] fax: [phone]         Physical Therapy          Physical Therapy: TREATMENT/PROGRESS NOTE   Patient: Padmaja Sears (59 y.o. female)   Examination Date: 2025   :  1965 MRN: [id number]   Visit #: 2   Insurance Allowable Auth Needed   BMN []Yes    [x]No   15% CI Insurance: Payor: [facility] / Plan: [employer] EM[employer]ES / Product Type: *No Product type* /   Insurance ID: [id number] - (Commercial)  Secondary Insurance (if applicable):    Treatment Diagnosis:     ICD-10-CM    1. Levator spasm  M62.838       2. Constipation due to slow transit  K59.01       3. Pelvic floor dysfunction  M62.89       4. Rectocele  N81.6            Medical Diagnosis:  Levator spasm [M62.838]  Slow transit constipation [K59.01]   Referring Physician: Addi Stockton MD  PCP: Karena Luna APRN - CNP   Plan of care signed (Y/N): Y  Cosigned by: Addi Stockton MD at 2025  1:47 PM       Date of Patient follow up with Physician:      Progress Report/POC: NO  Progress Report update due: (10 visits /OR AUTH LIMITS/every 30 days, whichever is less)  2025   POC/Re-certification due: (every 90 days) 2025                                            Precautions/ Contra-indications:           Latex allergy:  NO  Pacemaker:    NO  Contraindications for Manipulation: None  Date of Surgery: NA  Other:    Red Flags:  None    Suicide Screening:   The patient did not verbalize a primary behavioral concern, suicidal ideation, suicidal intent, or demonstrate suicidal behaviors.    Preferred Language for Healthcare:   [x] English       [] other:    SUBJECTIVE EXAMINATION     2025 - Patient reports having previous episode of PF PT with this PT (2021)- appreciated symptom resolution of bladder symptoms (urgency, frequency). Using topical vaginal estrogen and IC/PBS symptoms have

## 2025-05-20 ENCOUNTER — HOSPITAL ENCOUNTER (OUTPATIENT)
Dept: PHYSICAL THERAPY | Age: 60
Setting detail: THERAPIES SERIES
Discharge: HOME | End: 2025-05-20
Attending: OBSTETRICS & GYNECOLOGY
Payer: COMMERCIAL

## 2025-05-20 DIAGNOSIS — M62.838 LEVATOR SPASM: Primary | ICD-10-CM

## 2025-05-20 DIAGNOSIS — N81.6 RECTOCELE: ICD-10-CM

## 2025-05-20 DIAGNOSIS — K59.01 CONSTIPATION DUE TO SLOW TRANSIT: ICD-10-CM

## 2025-05-20 DIAGNOSIS — M62.89 PELVIC FLOOR DYSFUNCTION: ICD-10-CM

## 2025-06-03 NOTE — PLAN OF CARE
pain, and restore function.    After reviewing bowel/bladder behavioral modification information, PT used a pelvic floor model to orient the patient with her pelvic organ and pelvic floor muscles anatomy. Patient verbally consented to transvaginal pelvic muscle floor muscle assessment which revealed fair control/power of her pelvic floor muscles with inconsistent mind-body connection and limited coordination. Patient was able to demonstrate up to 2+-3/5 muscle contraction for 10 seconds (10 seconds is normal), however did not encourage PF muscle holding contractions due to tightness noted in PF muscles.   Noted  moderate  core instability and incoordination which can significantly effect systematic control of intraabdominal pressures subsequently challenge PF muscle control during functional activities.  With transvaginal palpation to PF muscles, noted moderate to severe tightness and tenderness in PF muscles, which responded well to transvaginal PF muscle/tissue releases. Noted adhesions with limited scar mobility in abdomen around umbilicus from previous surgeries which may be contributing to patient's symptoms.  Reviewed diaphragmatic breathing for PF relaxation and overall down training and gentle stretches for pelvic floor muscles and initiated targeted stretches.       Patient would continue to benefit from pelvic floor physical therapy for manual interventions, continued education on healthy bladder/bowel habits and adjustment of behavioral modifications as well as advanced activities to improve muscle control/ coordination and endurance of pelvic floor, core, and hip muscles to decrease pelvic/abdominal pain/pressure/discomfort due to constipation.      Today's Assessment: See above and POC/PROGRESS UPDATE: Pt. continues to present with functional deficits in strength symmetry, flexibility, and muscle activation  limiting ability with transitions between positions, light home activity, and pelvic

## 2025-06-06 ENCOUNTER — TELEMEDICINE (OUTPATIENT)
Age: 60
End: 2025-06-06
Payer: COMMERCIAL

## 2025-06-06 DIAGNOSIS — B37.9 ANTIBIOTIC-INDUCED YEAST INFECTION: Primary | ICD-10-CM

## 2025-06-06 DIAGNOSIS — T36.95XA ANTIBIOTIC-INDUCED YEAST INFECTION: Primary | ICD-10-CM

## 2025-06-06 PROCEDURE — 99213 OFFICE O/P EST LOW 20 MIN: CPT | Performed by: NURSE PRACTITIONER

## 2025-06-06 RX ORDER — FLUCONAZOLE 150 MG/1
TABLET ORAL
Qty: 2 TABLET | Refills: 0 | Status: SHIPPED | OUTPATIENT
Start: 2025-06-06

## 2025-06-06 RX ORDER — CIPROFLOXACIN 500 MG/1
500 TABLET, FILM COATED ORAL 2 TIMES DAILY
COMMUNITY
Start: 2025-06-03 | End: 2025-06-10

## 2025-06-06 ASSESSMENT — ENCOUNTER SYMPTOMS
RESPIRATORY NEGATIVE: 1
GASTROINTESTINAL NEGATIVE: 1

## 2025-06-06 NOTE — PATIENT INSTRUCTIONS
Notify office if you have no improvement or worsening of condition.   Wear loose cotton clothing. Don't wear nylon or other fabric that holds body heat and moisture close to the skin.  Try sleeping without underwear.  Don't scratch. Relieve itching with a cold pack or a cool bath.  Don't wash your vulva more than once a day. Use plain water or a mild, unscented soap. Air-dry the vulva.  Change out of wet or damp clothes as soon as possible.  Follow up with PCP in 3-4 days if not improving or sooner if worsening.  Please refer to educational handout with AVS.  Seek ED care if   You have new or increased pain in your vagina or pelvis.   Watch closely for changes in your health, and be sure to contact your doctor if:    You have unexpected vaginal bleeding.     You have a fever.     You are not getting better after 2 days.     Your symptoms come back after you finish your medicines.

## 2025-06-06 NOTE — PROGRESS NOTES
PHYSICAL EXAMINATION:  [ INSTRUCTIONS:  \"[x]\" Indicates a positive item  \"[]\" Indicates a negative item  -- DELETE ALL ITEMS NOT EXAMINED]  Vital Signs: (As obtained by patient/caregiver or practitioner observation)    Blood pressure-  Heart rate-    Respiratory rate-    Temperature-  Pulse oximetry-     Constitutional: [x] Appears well-developed and well-nourished [x] No apparent distress      [] Abnormal-   Mental status  [x] Alert and awake  [x] Oriented to person/place/time [x]Able to follow commands      Eyes:  EOM    [x]  Normal  [] Abnormal-  Sclera  [x]  Normal  [] Abnormal -         Discharge [x]  None visible  [] Abnormal -    HENT:   [x] Normocephalic, atraumatic.  [] Abnormal   [] Mouth/Throat: Mucous membranes are moist.     External Ears [x] Normal  [] Abnormal-     Neck: [x] No visualized mass     Pulmonary/Chest: [x] Respiratory effort normal.  [x] No visualized signs of difficulty breathing or respiratory distress        [] Abnormal-      Musculoskeletal:   [] Normal gait with no signs of ataxia         [x] Normal range of motion of neck        [] Abnormal-       Neurological:        [x] No Facial Asymmetry (Cranial nerve 7 motor function) (limited exam to video visit)          [x] No gaze palsy        [] Abnormal-         Skin:        [x] No significant exanthematous lesions or discoloration noted on facial skin         [] Abnormal-            Psychiatric:       [x] Normal Affect [] No Hallucinations        [] Abnormal-     Other pertinent observable physical exam findings-     ASSESSMENT/PLAN:  1. Antibiotic-induced yeast infection  Notify office if you have no improvement or worsening of condition.   Wear loose cotton clothing. Don't wear nylon or other fabric that holds body heat and moisture close to the skin.  Try sleeping without underwear.  Don't scratch. Relieve itching with a cold pack or a cool bath.  Don't wash your vulva more than once a day. Use plain water or a mild, unscented

## 2025-06-11 ENCOUNTER — HOSPITAL ENCOUNTER (OUTPATIENT)
Dept: PHYSICAL THERAPY | Age: 60
Setting detail: THERAPIES SERIES
Discharge: HOME | End: 2025-06-11
Attending: OBSTETRICS & GYNECOLOGY
Payer: COMMERCIAL

## 2025-06-11 PROCEDURE — 97112 NEUROMUSCULAR REEDUCATION: CPT | Performed by: PHYSICAL THERAPIST

## 2025-06-11 PROCEDURE — 97140 MANUAL THERAPY 1/> REGIONS: CPT | Performed by: PHYSICAL THERAPIST

## 2025-06-11 PROCEDURE — 97530 THERAPEUTIC ACTIVITIES: CPT | Performed by: PHYSICAL THERAPIST

## 2025-06-11 PROCEDURE — 97110 THERAPEUTIC EXERCISES: CPT | Performed by: PHYSICAL THERAPIST

## 2025-06-12 NOTE — FLOWSHEET NOTE
[facility]- Outpatient Rehabilitation and Therapy [address], [address], [place] [address] office: [phone] fax: [phone]        Physical Therapy       Physical Therapy: TREATMENT/PROGRESS NOTE   Patient: Padmaja Sears (59 y.o. female)   Examination Date: 2025   :  1965 MRN: [id number]   Visit #: 3   Insurance Allowable Auth Needed   BMN []Yes    [x]No   15% CI Insurance: Payor: [facility] / Plan: [employer] EMPLOYEES / Product Type: *No Product type* /   Insurance ID: [id number] - (Commercial)  Secondary Insurance (if applicable):    Treatment Diagnosis:     ICD-10-CM    1. Levator spasm  M62.838       2. Constipation due to slow transit  K59.01       3. Pelvic floor dysfunction  M62.89       4. Rectocele  N81.6            Medical Diagnosis:  Levator spasm [M62.838]  Slow transit constipation [K59.01]   Referring Physician: Addi Stockton MD  PCP: Karena Luna APRN - CNP   Plan of care signed (Y/N): Y  Cosigned by: Addi Stockton MD at 2025  1:47 PM     Cosigned by: Addi Stockton MD at 2025  7:44 AM       Date of Patient follow up with Physician:      Progress Report/POC: NO   Progress Report update due: (10 visits /OR AUTH LIMITS/every 30 days, whichever is less)  2025                                               Precautions/ Contra-indications:           Latex allergy:  NO  Pacemaker:    NO  Contraindications for Manipulation: None  Date of Surgery: NA  Other:    Red Flags:  None    Suicide Screening:   The patient did not verbalize a primary behavioral concern, suicidal ideation, suicidal intent, or demonstrate suicidal behaviors.    Preferred Language for Healthcare:   [x] English       [] other:    SUBJECTIVE EXAMINATION     2025 - Patient reports having previous episode of PF PT with this PT (2021)- appreciated symptom resolution of bladder symptoms (urgency, frequency). Using topical vaginal estrogen and IC/PBS symptoms have

## 2025-06-19 ENCOUNTER — HOSPITAL ENCOUNTER (OUTPATIENT)
Dept: PHYSICAL THERAPY | Age: 60
Setting detail: THERAPIES SERIES
Discharge: HOME | End: 2025-06-19
Attending: OBSTETRICS & GYNECOLOGY
Payer: COMMERCIAL

## 2025-06-19 PROCEDURE — 97110 THERAPEUTIC EXERCISES: CPT | Performed by: PHYSICAL THERAPIST

## 2025-06-19 PROCEDURE — 97530 THERAPEUTIC ACTIVITIES: CPT | Performed by: PHYSICAL THERAPIST

## 2025-06-19 PROCEDURE — 97112 NEUROMUSCULAR REEDUCATION: CPT | Performed by: PHYSICAL THERAPIST

## 2025-06-19 NOTE — FLOWSHEET NOTE
of posterior chain muscles while climbing steps with increased intention of pressing through heel rather than toes.       Addressed specific concerns of patient as they arose during session.     Patient appeared to have better understanding of current bladder/bowel issues and improved outlook on situation by end of PF PT therapy session.       Modalities:    No modalities applied this session    Education/Home Exercise Program: HEP discussed and performed, see exercise grid      ASSESSMENT   Padmaja Sears is a 59 y.o. female presenting today to Outpatient PT with signs and symptoms consistent with constipation and rectocele.with underlying PF muscle dysfunction.   Pt. presents with the functional impairments and activity limitations listed below and would benefit from Outpatient PT to address the below impairments as well as improve pain, and restore function.    Patient reports overall improved pressure in rectum but improved with decrease in constipation and consistently performing stretches. Patient continues to take medication to help manage stool consistency, which can be variable.      Continue to emphasized need to stretch muscles that attach to her pelvis, ultimately to allow less tension on her bladder and decrease her bladder symptoms.  Patient continues to respond well to stretches with focus on diaphragmatic breathing and down regulation of nervous system.      Patient mentioned increased R SI joint discomfort and tightness in R iliopsoas compared to L. Reviewed used of posterior chain during functional activities and addressed SI joint self-correction.     With transvaginal PF muscle reassessment and interventions, noted overall less tightness/tension in PF muscles which continue to respond well to manual interventions - patient noting moderate relief with manual interventions - less tension and pressure in rectum and around bladder at end of session.     Patient would continue to benefit from pelvic

## 2025-06-25 RX ORDER — CIPROFLOXACIN 500 MG/1
500 TABLET, FILM COATED ORAL 2 TIMES DAILY
Qty: 14 TABLET | Refills: 0 | Status: SHIPPED | OUTPATIENT
Start: 2025-06-25 | End: 2025-07-02

## 2025-07-01 ENCOUNTER — HOSPITAL ENCOUNTER (OUTPATIENT)
Dept: PHYSICAL THERAPY | Age: 60
Setting detail: THERAPIES SERIES
Discharge: HOME | End: 2025-07-01
Attending: OBSTETRICS & GYNECOLOGY
Payer: COMMERCIAL

## 2025-07-01 PROCEDURE — 97110 THERAPEUTIC EXERCISES: CPT | Performed by: PHYSICAL THERAPIST

## 2025-07-01 PROCEDURE — 97530 THERAPEUTIC ACTIVITIES: CPT | Performed by: PHYSICAL THERAPIST

## 2025-07-01 PROCEDURE — 97140 MANUAL THERAPY 1/> REGIONS: CPT | Performed by: PHYSICAL THERAPIST

## 2025-07-01 PROCEDURE — 97112 NEUROMUSCULAR REEDUCATION: CPT | Performed by: PHYSICAL THERAPIST

## 2025-07-01 NOTE — PLAN OF CARE
Continue emphasis/focus on exercise progression, improving proper muscle recruitment and activation/motor control patterns, modulating pain, promoting relaxation, improving soft tissue extensibility, allowing for proper ROM, kinesthetic sense and proprioception, and improving postural awareness. Next visit plan to add new exercises, do POC, adjust HEP, and continue current phase     Address any questions/concerns about use of posterior chain and SI joint self-corrections.     Review and offer modifications as requested for current stretches and integrated home program.     Continued education on multifactorial contributions to increased symptoms/flares.     Reassess and address PF muscle tissues externally and transvaginally as deemed appropriate and as patient consents.      Add in core stabilization activities while monitoring for response in PF muscles, ensuring tightness and tension does not return to previous levels, progressing to include during functional activities to improve intraabdominal/pelvic pressures. Educate in use of core stabilization during functional activities.       Electronically Signed by FE KRISHNAN, PT  Date: 07/22/2025     Note: Portions of this note have been templated and/or copied from initial evaluation, reassessments and prior notes for documentation efficiency.    Note: If patient does not return for scheduled/recommended follow up visits, this note will serve as a discharge from care along with the most recent update on progress.

## 2025-07-22 ENCOUNTER — HOSPITAL ENCOUNTER (OUTPATIENT)
Dept: PHYSICAL THERAPY | Age: 60
Setting detail: THERAPIES SERIES
Discharge: HOME | End: 2025-07-22
Attending: OBSTETRICS & GYNECOLOGY
Payer: COMMERCIAL

## 2025-08-11 ENCOUNTER — OFFICE VISIT (OUTPATIENT)
Dept: SURGERY | Age: 60
End: 2025-08-11
Payer: COMMERCIAL

## 2025-08-11 VITALS — RESPIRATION RATE: 16 BRPM | WEIGHT: 168.8 LBS | BODY MASS INDEX: 30.87 KG/M2

## 2025-08-11 DIAGNOSIS — M79.3 PANNICULITIS: Primary | ICD-10-CM

## 2025-08-11 PROCEDURE — 99214 OFFICE O/P EST MOD 30 MIN: CPT | Performed by: SURGERY

## 2025-08-12 RX ORDER — SODIUM CHLORIDE 0.9 % (FLUSH) 0.9 %
5-40 SYRINGE (ML) INJECTION EVERY 12 HOURS SCHEDULED
OUTPATIENT
Start: 2025-08-12

## 2025-08-12 RX ORDER — SODIUM CHLORIDE 9 MG/ML
INJECTION, SOLUTION INTRAVENOUS PRN
OUTPATIENT
Start: 2025-08-12

## 2025-08-12 RX ORDER — SODIUM CHLORIDE, SODIUM LACTATE, POTASSIUM CHLORIDE, CALCIUM CHLORIDE 600; 310; 30; 20 MG/100ML; MG/100ML; MG/100ML; MG/100ML
INJECTION, SOLUTION INTRAVENOUS CONTINUOUS
OUTPATIENT
Start: 2025-08-12

## 2025-08-12 RX ORDER — HEPARIN SODIUM 5000 [USP'U]/ML
5000 INJECTION, SOLUTION INTRAVENOUS; SUBCUTANEOUS
OUTPATIENT
Start: 2025-08-12 | End: 2025-08-12

## 2025-08-12 RX ORDER — SODIUM CHLORIDE 0.9 % (FLUSH) 0.9 %
5-40 SYRINGE (ML) INJECTION PRN
OUTPATIENT
Start: 2025-08-12

## 2025-08-20 ENCOUNTER — PATIENT MESSAGE (OUTPATIENT)
Dept: FAMILY MEDICINE CLINIC | Age: 60
End: 2025-08-20

## 2025-08-20 DIAGNOSIS — Z00.00 ENCOUNTER FOR WELL ADULT EXAM WITHOUT ABNORMAL FINDINGS: Primary | ICD-10-CM

## 2025-08-22 DIAGNOSIS — Z00.00 ENCOUNTER FOR WELL ADULT EXAM WITHOUT ABNORMAL FINDINGS: ICD-10-CM

## 2025-08-22 LAB
ALBUMIN SERPL-MCNC: 4.1 G/DL (ref 3.4–5)
ALBUMIN/GLOB SERPL: 1.7 {RATIO} (ref 1.1–2.2)
ALP SERPL-CCNC: 45 U/L (ref 40–129)
ALT SERPL-CCNC: 18 U/L (ref 10–40)
ANION GAP SERPL CALCULATED.3IONS-SCNC: 10 MMOL/L (ref 3–16)
AST SERPL-CCNC: 23 U/L (ref 15–37)
BASOPHILS # BLD: 0.1 K/UL (ref 0–0.2)
BASOPHILS NFR BLD: 0.8 %
BILIRUB SERPL-MCNC: 0.5 MG/DL (ref 0–1)
BUN SERPL-MCNC: 16 MG/DL (ref 7–20)
CALCIUM SERPL-MCNC: 9.3 MG/DL (ref 8.3–10.6)
CHLORIDE SERPL-SCNC: 104 MMOL/L (ref 99–110)
CHOLEST SERPL-MCNC: 224 MG/DL (ref 0–199)
CO2 SERPL-SCNC: 27 MMOL/L (ref 21–32)
CREAT SERPL-MCNC: 0.8 MG/DL (ref 0.6–1.1)
DEPRECATED RDW RBC AUTO: 13.6 % (ref 12.4–15.4)
EOSINOPHIL # BLD: 0.2 K/UL (ref 0–0.6)
EOSINOPHIL NFR BLD: 2.7 %
EST. AVERAGE GLUCOSE BLD GHB EST-MCNC: 111.2 MG/DL
GFR SERPLBLD CREATININE-BSD FMLA CKD-EPI: 85 ML/MIN/{1.73_M2}
GLUCOSE SERPL-MCNC: 99 MG/DL (ref 70–99)
HBA1C MFR BLD: 5.5 %
HCT VFR BLD AUTO: 41 % (ref 36–48)
HDLC SERPL-MCNC: 42 MG/DL (ref 40–60)
HGB BLD-MCNC: 14.2 G/DL (ref 12–16)
LDLC SERPL CALC-MCNC: 146 MG/DL
LYMPHOCYTES # BLD: 1.7 K/UL (ref 1–5.1)
LYMPHOCYTES NFR BLD: 26.3 %
MCH RBC QN AUTO: 30.8 PG (ref 26–34)
MCHC RBC AUTO-ENTMCNC: 34.7 G/DL (ref 31–36)
MCV RBC AUTO: 88.8 FL (ref 80–100)
MONOCYTES # BLD: 0.4 K/UL (ref 0–1.3)
MONOCYTES NFR BLD: 6.2 %
NEUTROPHILS # BLD: 4.1 K/UL (ref 1.7–7.7)
NEUTROPHILS NFR BLD: 64 %
PLATELET # BLD AUTO: 241 K/UL (ref 135–450)
PMV BLD AUTO: 10 FL (ref 5–10.5)
POTASSIUM SERPL-SCNC: 4.4 MMOL/L (ref 3.5–5.1)
PROT SERPL-MCNC: 6.5 G/DL (ref 6.4–8.2)
RBC # BLD AUTO: 4.61 M/UL (ref 4–5.2)
SODIUM SERPL-SCNC: 141 MMOL/L (ref 136–145)
TRIGL SERPL-MCNC: 179 MG/DL (ref 0–150)
TSH SERPL DL<=0.005 MIU/L-ACNC: 2.56 UIU/ML (ref 0.27–4.2)
VLDLC SERPL CALC-MCNC: 36 MG/DL
WBC # BLD AUTO: 6.4 K/UL (ref 4–11)